# Patient Record
Sex: FEMALE | Employment: FULL TIME | URBAN - METROPOLITAN AREA
[De-identification: names, ages, dates, MRNs, and addresses within clinical notes are randomized per-mention and may not be internally consistent; named-entity substitution may affect disease eponyms.]

---

## 2017-05-23 PROBLEM — F41.0 ANXIETY ATTACK: Status: ACTIVE | Noted: 2017-05-23

## 2017-05-23 PROBLEM — F33.3 SEVERE EPISODE OF RECURRENT MAJOR DEPRESSIVE DISORDER, WITH PSYCHOTIC FEATURES (HCC): Status: ACTIVE | Noted: 2017-05-23

## 2017-05-23 PROBLEM — F31.61 BIPOLAR 1 DISORDER, MIXED, MILD (HCC): Status: ACTIVE | Noted: 2017-05-23

## 2017-06-15 ENCOUNTER — HOSPITAL ENCOUNTER (OUTPATIENT)
Dept: PHYSICAL THERAPY | Age: 42
End: 2017-06-15

## 2017-06-20 PROBLEM — F31.62 BIPOLAR DISORDER, CURRENT EPISODE MIXED, MODERATE (HCC): Status: RESOLVED | Noted: 2017-06-20 | Resolved: 2017-06-20

## 2017-06-20 PROBLEM — F41.1 GENERALIZED ANXIETY DISORDER: Status: ACTIVE | Noted: 2017-06-20

## 2017-06-20 PROBLEM — F31.62 BIPOLAR DISORDER, CURRENT EPISODE MIXED, MODERATE (HCC): Status: ACTIVE | Noted: 2017-06-20

## 2017-06-20 PROBLEM — R45.4 ANGER REACTION: Status: ACTIVE | Noted: 2017-06-20

## 2017-06-20 PROBLEM — F31.61 BIPOLAR 1 DISORDER, MIXED, MILD (HCC): Status: RESOLVED | Noted: 2017-05-23 | Resolved: 2017-06-20

## 2017-06-20 PROBLEM — F33.3 SEVERE EPISODE OF RECURRENT MAJOR DEPRESSIVE DISORDER, WITH PSYCHOTIC FEATURES (HCC): Status: RESOLVED | Noted: 2017-05-23 | Resolved: 2017-06-20

## 2017-06-21 ENCOUNTER — HOSPITAL ENCOUNTER (OUTPATIENT)
Dept: PHYSICAL THERAPY | Age: 42
Discharge: HOME | End: 2017-06-21
Payer: COMMERCIAL

## 2017-06-21 PROCEDURE — 97161 PT EVAL LOW COMPLEX 20 MIN: CPT

## 2017-06-21 NOTE — PROGRESS NOTES
Ambulatory/Rehab Services H2 Model Falls Risk Assessment    Risk Factor Pts. ·   Confusion/Disorientation/Impulsivity  []    4 ·   Symptomatic Depression  []   2 ·   Altered Elimination  []   1 ·   Dizziness/Vertigo  []   1 ·   Gender (Male)  []   1 ·   Any administered antiepileptics (anticonvulsants):  []   2 ·   Any administered benzodiazepines:  []   1 ·   Visual Impairment (specify):  []   1 ·   Portable Oxygen Use  []   1 ·   Orthostatic ? BP  []   1 ·   History of Recent Falls (within 3 mos.)  []   5     Ability to Rise from Chair (choose one) Pts. ·   Ability to rise in a single movement  [x]   0 ·   Pushes up, successful in one attempt  []   1 ·   Multiple attempts, but successful  []   3 ·   Unable to rise without assistance  []   4   Total: (5 or greater = High Risk) 0     Falls Prevention Plan:   []                Physical Limitations to Exercise (specify):   []                Mobility Assistance Device (type):   []                Exercise/Equipment Adaptation (specify):    ©2010 [facility] [address] [place] [address] Patent #[id number].  Federal Law prohibits the replication, distribution or use without written permission from [facility] [employer]
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
## 2017-06-26 ENCOUNTER — HOSPITAL ENCOUNTER (OUTPATIENT)
Dept: PHYSICAL THERAPY | Age: 42
Discharge: HOME | End: 2017-06-26
Payer: COMMERCIAL

## 2017-06-26 ENCOUNTER — HOSPITAL ENCOUNTER (OUTPATIENT)
Dept: SURGERY | Age: 42
Discharge: HOME | End: 2017-06-26
Payer: COMMERCIAL

## 2017-06-26 VITALS
OXYGEN SATURATION: 100 % | SYSTOLIC BLOOD PRESSURE: 122 MMHG | HEIGHT: 67 IN | WEIGHT: 158.5 LBS | DIASTOLIC BLOOD PRESSURE: 71 MMHG | TEMPERATURE: 98 F | BODY MASS INDEX: 24.88 KG/M2 | HEART RATE: 105 BPM | RESPIRATION RATE: 20 BRPM

## 2017-06-26 LAB
ANION GAP BLD CALC-SCNC: 6 MMOL/L (ref 7–16)
APPEARANCE UR: CLEAR
BACTERIA SPEC CULT: NORMAL
BACTERIA URNS QL MICRO: 0 /HPF
BASOPHILS # BLD AUTO: 0.1 K/UL (ref 0–0.2)
BASOPHILS # BLD: 1 % (ref 0–2)
BILIRUB UR QL: ABNORMAL
BUN SERPL-MCNC: 8 MG/DL (ref 6–23)
CALCIUM SERPL-MCNC: 8.4 MG/DL (ref 8.3–10.4)
CHLORIDE SERPL-SCNC: 108 MMOL/L (ref 98–107)
CO2 SERPL-SCNC: 29 MMOL/L (ref 21–32)
COLOR UR: YELLOW
CREAT SERPL-MCNC: 0.73 MG/DL (ref 0.6–1)
DIFFERENTIAL METHOD BLD: ABNORMAL
EOSINOPHIL # BLD: 0.2 K/UL (ref 0–0.8)
EOSINOPHIL NFR BLD: 2 % (ref 0.5–7.8)
ERYTHROCYTE [DISTWIDTH] IN BLOOD BY AUTOMATED COUNT: 14.6 % (ref 11.9–14.6)
GLUCOSE SERPL-MCNC: 69 MG/DL (ref 65–100)
GLUCOSE UR STRIP.AUTO-MCNC: NEGATIVE MG/DL
HCT VFR BLD AUTO: 42 % (ref 35.8–46.3)
HGB BLD-MCNC: 13.2 G/DL (ref 11.7–15.4)
HGB UR QL STRIP: ABNORMAL
IMM GRANULOCYTES # BLD: 0 K/UL (ref 0–0.5)
IMM GRANULOCYTES NFR BLD AUTO: 0.6 % (ref 0–5)
KETONES UR QL STRIP.AUTO: NEGATIVE MG/DL
LEUKOCYTE ESTERASE UR QL STRIP.AUTO: NEGATIVE
LYMPHOCYTES # BLD AUTO: 33 % (ref 13–44)
LYMPHOCYTES # BLD: 2.4 K/UL (ref 0.5–4.6)
MCH RBC QN AUTO: 24.6 PG (ref 26.1–32.9)
MCHC RBC AUTO-ENTMCNC: 31.4 G/DL (ref 31.4–35)
MCV RBC AUTO: 78.2 FL (ref 79.6–97.8)
MONOCYTES # BLD: 0.7 K/UL (ref 0.1–1.3)
MONOCYTES NFR BLD AUTO: 10 % (ref 4–12)
NEUTS SEG # BLD: 3.9 K/UL (ref 1.7–8.2)
NEUTS SEG NFR BLD AUTO: 53 % (ref 43–78)
NITRITE UR QL STRIP.AUTO: NEGATIVE
PH UR STRIP: 6 [PH] (ref 5–9)
PLATELET # BLD AUTO: 236 K/UL (ref 150–450)
PMV BLD AUTO: 11.6 FL (ref 10.8–14.1)
POTASSIUM SERPL-SCNC: 3.9 MMOL/L (ref 3.5–5.1)
PROT UR STRIP-MCNC: NEGATIVE MG/DL
RBC # BLD AUTO: 5.37 M/UL (ref 4.05–5.25)
SERVICE CMNT-IMP: NORMAL
SODIUM SERPL-SCNC: 143 MMOL/L (ref 136–145)
SP GR UR REFRACTOMETRY: 1.02 (ref 1–1.02)
UROBILINOGEN UR QL STRIP.AUTO: 0.2 EU/DL (ref 0.2–1)
WBC # BLD AUTO: 7.2 K/UL (ref 4.3–11.1)

## 2017-06-26 PROCEDURE — 97140 MANUAL THERAPY 1/> REGIONS: CPT

## 2017-06-26 RX ORDER — TIZANIDINE 4 MG/1
4 TABLET ORAL
COMMUNITY
End: 2017-07-26

## 2017-06-26 RX ORDER — CLONAZEPAM 1 MG/1
1 TABLET ORAL 2 TIMES DAILY
COMMUNITY
End: 2017-08-02 | Stop reason: SDUPTHER

## 2017-06-26 RX ORDER — ALBUTEROL SULFATE 90 UG/1
2 AEROSOL, METERED RESPIRATORY (INHALATION)
COMMUNITY
End: 2017-07-12 | Stop reason: SDUPTHER

## 2017-06-26 NOTE — PERIOP NOTES
todays labs reviewed-- urine abnormal--- called office to report---left detail message for Barbara Perez out return # if any questions  - called pt to inform nasal swab from today was neg-- no tx needed--pt verbalized understanding
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
## 2017-06-26 NOTE — PERIOP NOTES
Patient verified name, , and surgery as listed in [facility]. TYPE  CASE:3  Orders per surgeon on chart  Labs per surgeon: per protocol-- cbc with diff, bmp, urine--- these collected and sent to lab  Labs per anesthesia protocol: type and screen dos  EKG  :  none  MRSA/MSSA:collected today and sent to lab  Pt instructed that they will be notified of positive results and will use mupirocin ointment as directed. Patient provided with handouts including guide to surgery , transfusions, pain management and hand hygiene for the family and community. Pt verbalizes understanding of all pre-op instructions . Instructed that family must be present in building at all times. Hibiclens and instructions given per hospital policy. Instructed patient to continue  previous medications as prescribed prior to surgery and  to take the following medications the day of surgery according to anesthesia guidelines : klonopin, cymbalta, seroquel, if needed flexeril OR zanaflex-- , oxycodone   And bring albuterol inhaler dos    Continue all previous medications unless otherwise directed. Original medication prescription bottles was not visualized during patient appointment. Instructed patient to hold  the following medications prior to surgery: mobic      Patient verbalized understanding of all instructions and provided all medical/health information to the best of their ability. Road to Recovery Spine surgery patient guide given. Instructed on incentive spirometry with return demonstration. Long handled prehab sponge given with instructions for use. Patient viewed spine prehab video.      Spoke with dr Raul Yoon over the phone-- case over 200 minutes--- ok for mda to see dos

## 2017-06-28 ENCOUNTER — APPOINTMENT (OUTPATIENT)
Dept: PHYSICAL THERAPY | Age: 42
End: 2017-06-28
Payer: COMMERCIAL

## 2017-07-18 ENCOUNTER — HOSPITAL ENCOUNTER (OUTPATIENT)
Dept: PHYSICAL THERAPY | Age: 42
Discharge: HOME | End: 2017-07-18
Payer: COMMERCIAL

## 2017-07-18 PROCEDURE — 97161 PT EVAL LOW COMPLEX 20 MIN: CPT

## 2017-07-18 NOTE — PROGRESS NOTES
Ambulatory/Rehab Services H2 Model Falls Risk Assessment    Risk Factor Pts. ·   Confusion/Disorientation/Impulsivity  []    4 ·   Symptomatic Depression  []   2 ·   Altered Elimination  []   1 ·   Dizziness/Vertigo  []   1 ·   Gender (Male)  []   1 ·   Any administered antiepileptics (anticonvulsants):  []   2 ·   Any administered benzodiazepines:  []   1 ·   Visual Impairment (specify):  []   1 ·   Portable Oxygen Use  []   1 ·   Orthostatic ? BP  []   1 ·   History of Recent Falls (within 3 mos.)  []   5     Ability to Rise from Chair (choose one) Pts. ·   Ability to rise in a single movement  [x]   0 ·   Pushes up, successful in one attempt  []   1 ·   Multiple attempts, but successful  []   3 ·   Unable to rise without assistance  []   4   Total: (5 or greater = High Risk) 0     Falls Prevention Plan:   []                Physical Limitations to Exercise (specify):   []                Mobility Assistance Device (type):   []                Exercise/Equipment Adaptation (specify):    ©2010 [employer] of [address] Patent #[id number].  Federal Law prohibits the replication, distribution or use without written permission from [employer]

## 2017-07-18 NOTE — PROGRESS NOTES
Aimee Flores  : [address] [facility] KRANTHI PLASCENCIA  [facility], [address] [place] [address],[address], [place] [address].  [phone]   [phone]         OUTPATIENT PHYSICAL THERAPY:Initial Assessment 2017    ICD-10: Treatment Diagnosis: Low back pain (M54.5), Spinal stenosis, lumbar region (M48.06), Other intervertebral disc degeneration, lumbosacral region (M51.37)  Precautions/Allergies:   Tramadol   Fall Risk Score: 0 (? 5 = High Risk)  MD Orders: Evaluate and treat, modalities, core strengthening and stretching (17) MEDICAL/REFERRING DIAGNOSIS:  Spinal stenosis of lumbar region with neurogenic claudication, degeneration of lumbosacral intervertebral disc   DATE OF ONSET: \"Years ago\"  REFERRING PHYSICIAN: Yakov Phan MD  RETURN PHYSICIAN APPOINTMENT: TBD; pt unsure     INITIAL ASSESSMENT:  Ms. Hailey Chavez presents to PT with complaints of lower back pain which affects both legs which is worse on the L today. Patient demonstrates apprehension to perform movements involving lower back, increased pain and radicular pain into L LE. Patient's impairments limit her ability to perform work tasks, limit her ability to ambulate for long distances and generally reduce her activity participation overall. Patient will benefit from skilled PT intervention to reduce her discomfort and maximize her physical capabilities to improve function. PROBLEM LIST (Impacting functional limitations):  1. Decreased Strength  2. Increased Pain  3. Decreased Activity Tolerance  4. Decreased Flexibility/Joint Mobility INTERVENTIONS PLANNED:  1. Heat  2. Home Exercise Program (HEP)  3. Manual Therapy  4. Range of Motion (ROM)  5. Therapeutic Exercise/Strengthening   TREATMENT PLAN:  Effective Dates: 17 TO 17. Frequency/Duration: 2-3 visits per week for 6 weeks\"}  GOALS: (Goals have been discussed and agreed upon with patient.)  Short-Term Functional Goals: Time Frame: 3 weeks  1.  Pt will report 3-4 out of 10 pain in legs and lower back and improve score on Oswestry Disability Index to 30% to indicate improved subjective report of function. 2. Patient will report being able to sleep without interference for >3 nights per week from low back and/or leg symptoms. 3. Patient will tolerate 30 minutes of physical therapy treatment without exacerbation of low back and leg symptoms. 4. Patient will be independent and compliant with home exercise program.  Discharge Goals: Time Frame: 6 weeks  1. Patient will report no greater than 4 out of 10 low back and leg pain at worst  2. Patient will be able to ambulate on treadmill for greater than 30 minutes without having to cease activity due to low back and/or leg pain. 3. Patient will report being able to walk around mall without increased lower back pain. 4. Patient will be improve score on Oswestry Disability index to 20% to indicate improved function. 5. Pt will be independent with advanced HEP for trunk stabilization. Rehabilitation Potential For Stated Goals: Fair, guarded    Regarding [facility], I certify that the treatment plan above will be carried out by a therapist or under their direction. Thank you for this referral,    Norma Harris PT       Referring Physician Signature:     Beatrice Barros MD              Date                    The information in this section was collected on 7-18-17 (except where otherwise noted). HISTORY:   History of Present Injury/Illness (Reason for Referral):  Pt reports that she has had lower back pain for 'years'. She has had physical therapy in the past and multiple injections which were all unsuccessful at resolving symptoms. Patient states that she has pain that radiates into both legs to the ankle and incapacitates her to the point that she has to miss work (approximately 3-4 times per month).  States that she is unable to walk on treadmill for >10 minutes without having to quit due to back pain. Reports that when her symptoms are aggravated she stays in bed in fetal position with her son taking care of her. Past Medical History/Comorbidities:   Ms. Lynne Nieto  has a past medical history of Acid reflux; Anemia; Anxiety; Arthralgia of multiple sites; Asthma; Bipolar 1 disorder ([address] Chronic pain; Depressive disorder; Genital herpes; MRSA (methicillin resistant staph aureus) culture positive; and Spinal stenosis. She also has no past medical history of Adverse effect of anesthesia; Difficult intubation; Malignant hyperthermia due to anesthesia; Nausea & vomiting; or Pseudocholinesterase deficiency. Ms. Lynne Nieto  has a past surgical history that includes tubal ligation. Social History/Living Environment:     Patient living with friend in apartment with four steps to enter. Patient's son also lives with her. 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 Current Medications:       Current Outpatient Prescriptions:     traMADol (ULTRAM) 50 mg tablet, TAKE 1 TABLET BY MOUTH 3 TIMES A DAY AS NEEDED FOR PAIN, Disp: , Rfl: 0    meloxicam (MOBIC) 7.5 mg tablet, Take 1 Tab by mouth two (2) times daily (with meals). , Disp: 60 Tab, Rfl: 2    gabapentin (NEURONTIN) 600 mg tablet, Take 0.5 Tabs by mouth three (3) times daily. , Disp: 90 Tab, Rfl: 2    montelukast (SINGULAIR) 10 mg tablet, Take 1 Tab by mouth daily. , Disp: 30 Tab, Rfl: 2    SUMAtriptan (IMITREX) 50 mg tablet, Take 1 Tab by mouth once as needed for Migraine (may repeat after 2 hours if needed, max 2 tablets in 24 hours). , Disp: 9 Tab, Rfl: 2    furosemide (LASIX) 20 mg tablet, Take 1 Tab by mouth daily as needed. , Disp: 30 Tab, Rfl: 1    ADVAIR DISKUS 250-50 mcg/dose diskus inhaler, Take 1 Puff by inhalation two (2) times a day., Disp: 1 Inhaler, Rfl: 2    promethazine (PHENERGAN) 25 mg tablet, Take 1 Tab by mouth every six (6) hours as needed for Nausea (associated with migraines). , Disp: 30 Tab, Rfl: 0    raNITIdine (ZANTAC) 300 mg tablet, Take 1 Tab by mouth daily. , Disp: 30 Tab, Rfl: 2    oxyCODONE IR (OXY-IR) 15 mg immediate release tablet, Take 15 mg by mouth every four (4) hours as needed for Pain., Disp: , Rfl:     oxyCODONE-acetaminophen (PERCOCET)  mg per tablet, Take  by mouth., Disp: , Rfl:     aspirin (ASPIRIN) 325 mg tablet, Take 325 mg by mouth., Disp: , Rfl:     nitroglycerin (NITROSTAT) 0.4 mg SL tablet, 0.4 mg by SubLINGual route., Disp: , Rfl:     albuterol (PROVENTIL HFA, VENTOLIN HFA, PROAIR HFA) 90 mcg/actuation inhaler, Take 2 Puffs by inhalation. , Disp: , Rfl:     acyclovir (ZOVIRAX) 400 mg tablet, TAKE 1 TABLET BY MOUTH TWICE A DAY, Disp: , Rfl: 12    clonazePAM (KLONOPIN) 1 mg tablet, Take 1 mg by mouth two (2) times a day., Disp: , Rfl:     tiZANidine (ZANAFLEX) 4 mg tablet, Take 4 mg by mouth three (3) times daily as needed. , Disp: , Rfl:     DULoxetine (CYMBALTA) 60 mg capsule, Take 1 Cap by mouth two (2) times a day for 30 days. , Disp: 60 Cap, Rfl: 1    QUEtiapine (SEROQUEL) 100 mg tablet, Take 1 Tab by mouth three (3) times daily for 30 days. , Disp: 90 Tab, Rfl: 3    cyclobenzaprine (FLEXERIL) 10 mg tablet, Take  by mouth three (3) times daily as needed for Muscle Spasm(s). , Disp: , Rfl:    Date Last Reviewed:  7-1817   Number of Personal Factors/Comorbidities that affect the Plan of Care: 1-2: MODERATE COMPLEXITY   EXAMINATION:   Observation/Orthostatic Postural Assessment:          Patient leans to the R during subjective portion of evaluation. Ambulates slowly with increased upright trunk posture during gait. Patient is very hesitant to perform ROM measures for lower back. Palpation:          Apprehension with palpation of lumbar paraspinals. Muscles felt tight and guarded but palpation was limited to mild, light touch due to patient apprehension.   ROM:          Patient declined to perform lumbar flexion ROM stating that she did not want to 'aggravate' anything. Lumbar extension limited to 25% or normal ROM but did not reproduce pain. Strength:          R LE: hip flexion, IR/ER and abduction 5/5, knee flexion/ext 5/5, ankle DF/PF 5/5        L LE: hip flexion, IR/ER and abduction 5/5, knee flex/ext 5/5, ankle DF/PF 5/5  Special Tests:          Slump test negative bilaterally, inconclusive straight leg raise bilaterally secondary to apprehension with movement  Neurological Screen:  Normal sensation to light touch bilaterally, reflexes normal at S2 bilaterally, mildly reduced L3-4 on L, normal on R       Body Structures Involved:  1. Nerves  2. Bones  3. Joints  4. Muscles Body Functions Affected:  1. Sensory/Pain  2. Neuromusculoskeletal  3. Movement Related Activities and Participation Affected:  1. Mobility  2. Self Care  3. Domestic Life  4. Community, Social and [place]   Number of elements (examined above) that affect the Plan of Care: 4+: HIGH COMPLEXITY   CLINICAL PRESENTATION:   Presentation: Stable and uncomplicated: LOW COMPLEXITY   CLINICAL DECISION MAKING:   Outcome Measure: Tool Used: Modified Oswestry Low Back Pain Questionnaire  Score:  Initial: 24/50  Most Recent: 24/50 (Date: 7-19-17 )   Interpretation of Score: Each section is scored on a 0-5 scale, 5 representing the greatest disability. The scores of each section are added together for a total score of 50. Medical Necessity:   · Skilled intervention continues to be required due to heightened fear of low back movement, increased pain that radiates into B LEs, decreased activity participation secondary to low back pain. .  Reason for Services/Other Comments:  · Patient is hopeful to have surgery and expressed doubts regarding effectiveness of physical therapy. .   Use of outcome tool(s) and clinical judgement create a POC that gives a: Questionable prediction of patient's progress: MODERATE COMPLEXITY            TREATMENT:   (In addition to Assessment/Re-Assessment sessions the following treatments were rendered)  Pre-treatment Symptoms/Complaints:  Patient reported that she has had multiple physical therapy appointments and spinal injections in the past to address her lower back symptoms which were not successful. Stated that she is hesitant to schedule appointments in the middle of the week as low back irritation will be so severe it will keep her out of work for days. Reported that she is hopeful to have the surgery but has to have physical therapy beforehand in order for her insurance to pay for it. Pain: Initial:     7 out of 10 Post Session:  7 out of 10     Assessment only today, no treatment provided. HEP: Pt provided with handout and received instruction on performance of posterior pelvic tilts and single knee to chest stretch to address stenosis symptoms. Patient verbalized understanding, stating that she had performed them before without symptoms resolution. Treatment/Session Assessment:    · Response to Treatment:  Patient very hesitant to receive any manual treatment and unwilling to perform lumbar ROM during evaluation. Patient demonstrated increased hesitation that she would exacerbate her symptoms if she moved. Concern by PT that patient may have self-limiting attitude regarding compliance with treatment due to chronic nature of condition and previous unsuccessful treatments with PT. No movements during treatment created symptom exacerbation or increased complaints from patient of LE radicular symptoms. · Compliance with Program/Exercises: Will assess as treatment progresses. · Recommendations/Intent for next treatment session: \"Next visit will focus on decreased apprehension with treatment and increased symptom centralization. \".   Total Treatment Duration: 41 minutes  Time In: 1536  Time Out: [address], PT

## 2017-07-20 ENCOUNTER — HOSPITAL ENCOUNTER (OUTPATIENT)
Dept: PHYSICAL THERAPY | Age: 42
Discharge: HOME | End: 2017-07-20
Payer: COMMERCIAL

## 2017-07-20 PROCEDURE — 97110 THERAPEUTIC EXERCISES: CPT

## 2017-07-20 PROCEDURE — 97140 MANUAL THERAPY 1/> REGIONS: CPT

## 2017-07-20 NOTE — PROGRESS NOTES
Louis Polo  : 1975 [facility] KRANTHI PLASCENCIA  [address] [place] [address],  Diaz Alejandre.  [phone]   [phone]         OUTPATIENT PHYSICAL THERAPY:Daily Note 2017    ICD-10: Treatment Diagnosis: Low back pain (M54.5), Spinal stenosis, lumbar region (M48.06), Other intervertebral disc degeneration, lumbosacral region (M51.37)  Precautions/Allergies:   Tramadol   Fall Risk Score: 0 (? 5 = High Risk)  MD Orders: Evaluate and treat, modalities, core strengthening and stretching (17) MEDICAL/REFERRING DIAGNOSIS:  Spinal stenosis of lumbar region with neurogenic claudication, degeneration of lumbosacral intervertebral disc   DATE OF ONSET: \"Years ago\"  REFERRING PHYSICIAN: Jaqueline Mixon MD  RETURN PHYSICIAN APPOINTMENT: TBD; pt unsure     INITIAL ASSESSMENT:  Ms. Rajesh Yo presents to PT with complaints of lower back pain which affects both legs which is worse on the L today. Patient demonstrates apprehension to perform movements involving lower back, increased pain and radicular pain into L LE. Patient's impairments limit her ability to perform work tasks, limit her ability to ambulate for long distances and generally reduce her activity participation overall. Patient will benefit from skilled PT intervention to reduce her discomfort and maximize her physical capabilities to improve function. PROBLEM LIST (Impacting functional limitations):  1. Decreased Strength  2. Increased Pain  3. Decreased Activity Tolerance  4. Decreased Flexibility/Joint Mobility INTERVENTIONS PLANNED:  1. Heat  2. Home Exercise Program (HEP)  3. Manual Therapy  4. Range of Motion (ROM)  5. Therapeutic Exercise/Strengthening   TREATMENT PLAN:  Effective Dates: 17 TO 17. Frequency/Duration: 2-3 visits per week for 6 weeks\"}  GOALS: (Goals have been discussed and agreed upon with patient.)  Short-Term Functional Goals: Time Frame: 3 weeks  1.  Pt will report 3-4 out of 10 pain in legs and lower back and improve score on Oswestry Disability Index to 30% to indicate improved subjective report of function. 2. Patient will report being able to sleep without interference for >3 nights per week from low back and/or leg symptoms. 3. Patient will tolerate 30 minutes of physical therapy treatment without exacerbation of low back and leg symptoms. 4. Patient will be independent and compliant with home exercise program.  Discharge Goals: Time Frame: 6 weeks  1. Patient will report no greater than 4 out of 10 low back and leg pain at worst  2. Patient will be able to ambulate on treadmill for greater than 30 minutes without having to cease activity due to low back and/or leg pain. 3. Patient will report being able to walk around mall without increased lower back pain. 4. Patient will be improve score on Oswestry Disability index to 20% to indicate improved function. 5. Pt will be independent with advanced HEP for trunk stabilization. Rehabilitation Potential For Stated Goals: Fair, guarded    Regarding [facility], I certify that the treatment plan above will be carried out by a therapist or under their direction. Thank you for this referral,    Alysa Ingram, PT       Referring Physician Signature:     Alis Barraza MD              Date                    The information in this section was collected on 7-18-17 (except where otherwise noted). HISTORY:   History of Present Injury/Illness (Reason for Referral):  Pt reports that she has had lower back pain for 'years'. She has had physical therapy in the past and multiple injections which were all unsuccessful at resolving symptoms. Patient states that she has pain that radiates into both legs to the ankle and incapacitates her to the point that she has to miss work (approximately 3-4 times per month). States that she is unable to walk on treadmill for >10 minutes without having to quit due to back pain. Reports that when her symptoms are aggravated she stays in bed in fetal position with her son taking care of her. Past Medical History/Comorbidities:   Ms. Leopold Birkenhead  has a past medical history of Acid reflux; Anemia; Anxiety; Arthralgia of multiple sites; Asthma; Bipolar 1 disorder ([address] Chronic pain; Depressive disorder; Genital herpes; MRSA (methicillin resistant staph aureus) culture positive; and Spinal stenosis. She also has no past medical history of Adverse effect of anesthesia; Difficult intubation; Malignant hyperthermia due to anesthesia; Nausea & vomiting; or Pseudocholinesterase deficiency. Ms. Leopold Birkenhead  has a past surgical history that includes tubal ligation. Social History/Living Environment:     Patient living with friend in apartment with four steps to enter. Patient's son also lives with her. 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 Current Medications:       Current Outpatient Prescriptions:     traMADol (ULTRAM) 50 mg tablet, TAKE 1 TABLET BY MOUTH 3 TIMES A DAY AS NEEDED FOR PAIN, Disp: , Rfl: 0    meloxicam (MOBIC) 7.5 mg tablet, Take 1 Tab by mouth two (2) times daily (with meals). , Disp: 60 Tab, Rfl: 2    gabapentin (NEURONTIN) 600 mg tablet, Take 0.5 Tabs by mouth three (3) times daily. , Disp: 90 Tab, Rfl: 2    montelukast (SINGULAIR) 10 mg tablet, Take 1 Tab by mouth daily. , Disp: 30 Tab, Rfl: 2    SUMAtriptan (IMITREX) 50 mg tablet, Take 1 Tab by mouth once as needed for Migraine (may repeat after 2 hours if needed, max 2 tablets in 24 hours). , Disp: 9 Tab, Rfl: 2    furosemide (LASIX) 20 mg tablet, Take 1 Tab by mouth daily as needed. , Disp: 30 Tab, Rfl: 1    ADVAIR DISKUS 250-50 mcg/dose diskus inhaler, Take 1 Puff by inhalation two (2) times a day., Disp: 1 Inhaler, Rfl: 2    promethazine (PHENERGAN) 25 mg tablet, Take 1 Tab by mouth every six (6) hours as needed for Nausea (associated with migraines). , Disp: 30 Tab, Rfl: 0    raNITIdine (ZANTAC) 300 mg tablet, Take 1 Tab by mouth daily. , Disp: 30 Tab, Rfl: 2    oxyCODONE IR (OXY-IR) 15 mg immediate release tablet, Take 15 mg by mouth every four (4) hours as needed for Pain., Disp: , Rfl:     oxyCODONE-acetaminophen (PERCOCET)  mg per tablet, Take  by mouth., Disp: , Rfl:     aspirin (ASPIRIN) 325 mg tablet, Take 325 mg by mouth., Disp: , Rfl:     nitroglycerin (NITROSTAT) 0.4 mg SL tablet, 0.4 mg by SubLINGual route., Disp: , Rfl:     albuterol (PROVENTIL HFA, VENTOLIN HFA, PROAIR HFA) 90 mcg/actuation inhaler, Take 2 Puffs by inhalation. , Disp: , Rfl:     acyclovir (ZOVIRAX) 400 mg tablet, TAKE 1 TABLET BY MOUTH TWICE A DAY, Disp: , Rfl: 12    clonazePAM (KLONOPIN) 1 mg tablet, Take 1 mg by mouth two (2) times a day., Disp: , Rfl:     tiZANidine (ZANAFLEX) 4 mg tablet, Take 4 mg by mouth three (3) times daily as needed. , Disp: , Rfl:     DULoxetine (CYMBALTA) 60 mg capsule, Take 1 Cap by mouth two (2) times a day for 30 days. , Disp: 60 Cap, Rfl: 1    QUEtiapine (SEROQUEL) 100 mg tablet, Take 1 Tab by mouth three (3) times daily for 30 days. , Disp: 90 Tab, Rfl: 3    cyclobenzaprine (FLEXERIL) 10 mg tablet, Take  by mouth three (3) times daily as needed for Muscle Spasm(s). , Disp: , Rfl:    Date Last Reviewed:  7-1817   Number of Personal Factors/Comorbidities that affect the Plan of Care: 1-2: MODERATE COMPLEXITY   EXAMINATION:   Observation/Orthostatic Postural Assessment:          Patient leans to the R during subjective portion of evaluation. Ambulates slowly with increased upright trunk posture during gait. Patient is very hesitant to perform ROM measures for lower back. Palpation:          Apprehension with palpation of lumbar paraspinals. Muscles felt tight and guarded but palpation was limited to mild, light touch due to patient apprehension.   ROM:          Patient declined to perform lumbar flexion ROM stating that she did not want to 'aggravate' anything. Lumbar extension limited to 25% or normal ROM but did not reproduce pain. Strength:          R LE: hip flexion, IR/ER and abduction 5/5, knee flexion/ext 5/5, ankle DF/PF 5/5        L LE: hip flexion, IR/ER and abduction 5/5, knee flex/ext 5/5, ankle DF/PF 5/5  Special Tests:          Slump test negative bilaterally, inconclusive straight leg raise bilaterally secondary to apprehension with movement  Neurological Screen:  Normal sensation to light touch bilaterally, reflexes normal at S2 bilaterally, mildly reduced L3-4 on L, normal on R       Body Structures Involved:  1. Nerves  2. Bones  3. Joints  4. Muscles Body Functions Affected:  1. Sensory/Pain  2. Neuromusculoskeletal  3. Movement Related Activities and Participation Affected:  1. Mobility  2. Self Care  3. Domestic Life  4. Community, Social and [place]   Number of elements (examined above) that affect the Plan of Care: 4+: HIGH COMPLEXITY   CLINICAL PRESENTATION:   Presentation: Stable and uncomplicated: LOW COMPLEXITY   CLINICAL DECISION MAKING:   Outcome Measure: Tool Used: Modified Oswestry Low Back Pain Questionnaire  Score:  Initial: 24/50  Most Recent: 24/50 (Date: 7-19-17 )   Interpretation of Score: Each section is scored on a 0-5 scale, 5 representing the greatest disability. The scores of each section are added together for a total score of 50. Medical Necessity:   · Skilled intervention continues to be required due to heightened fear of low back movement, increased pain that radiates into B LEs, decreased activity participation secondary to low back pain. .  Reason for Services/Other Comments:  · Patient is hopeful to have surgery and expressed doubts regarding effectiveness of physical therapy. .   Use of outcome tool(s) and clinical judgement create a POC that gives a: Questionable prediction of patient's progress: MODERATE COMPLEXITY            TREATMENT:   (In addition to Assessment/Re-Assessment sessions the following treatments were rendered)  Pre-treatment Symptoms/Complaints: \"We can work on my legs, but not my back\". Pain: Initial:     7 out of 10 Post Session:  Stated that her pain was worse; no numeric value reported     Manual therapy (14 minutes): long-axis hip distraction bilaterally and B hip ROM to reduce LE symptoms and improve B LE ROM    Therapeutic exercise ( 17 minutes): Exercises performed per grid below. Patient required verbal cues and demonstration to perform hip abduction exercises and required encouragement to perform exercises correctly. Date:  7-20-17 Date:   Date:     Activity/Exercise Parameters Parameters Parameters   Glute sets 2 x 10ea     Lumbar ball rolls 2 x 10ea     Standing hip abduction 2 x 10  ea     Calf raises 2 x 10 ea     Slantboard calf stretches 3 x 20\" each                    HEP: Pt instructed to continue with existing HEP. Treatment/Session Assessment:    · Response to Treatment:  Pt remains apprehensive to receive any manual treatment interventions aimed at low back symptoms or which involve larger movements of LEs. Patient able to perform hip/gluteal exercises without visible evidence of increased lower back pain. · Compliance with Program/Exercises: Compliant with attendance. Patient reluctant to participate in trunk strengthening exercises. · Recommendations/Intent for next treatment session: \"Next visit will focus on decreased apprehension with treatment and increased symptom centralization. \".   Total Treatment Duration:31 minutes  Time In: 1536  Time Out: [address], PT

## 2017-07-25 ENCOUNTER — HOSPITAL ENCOUNTER (OUTPATIENT)
Dept: PHYSICAL THERAPY | Age: 42
Discharge: HOME | End: 2017-07-25
Payer: COMMERCIAL

## 2017-07-25 PROCEDURE — 97110 THERAPEUTIC EXERCISES: CPT

## 2017-07-25 NOTE — PROGRESS NOTES
Marco Antonio Uribe  : 1975 [facility] at [facility] KRANTHI PLASCENCIA  [address] [place] [address], [facility], [address] [place] [address]  [phone]   [phone]         OUTPATIENT PHYSICAL THERAPY:Daily Note 2017    ICD-10: Treatment Diagnosis: Low back pain (M54.5), Spinal stenosis, lumbar region (M48.06), Other intervertebral disc degeneration, lumbosacral region (M51.37)  Precautions/Allergies:   Tramadol   Fall Risk Score: 0 (? 5 = High Risk)  MD Orders: Evaluate and treat, modalities, core strengthening and stretching (17) MEDICAL/REFERRING DIAGNOSIS:  Spinal stenosis of lumbar region with neurogenic claudication, degeneration of lumbosacral intervertebral disc   DATE OF ONSET: \"Years ago\"  REFERRING PHYSICIAN: Landen Saravia MD  RETURN PHYSICIAN APPOINTMENT: TBD; pt unsure     INITIAL ASSESSMENT:  Ms. Tayla Crain presents to PT with complaints of lower back pain which affects both legs which is worse on the L today. Patient demonstrates apprehension to perform movements involving lower back, increased pain and radicular pain into L LE. Patient's impairments limit her ability to perform work tasks, limit her ability to ambulate for long distances and generally reduce her activity participation overall. Patient will benefit from skilled PT intervention to reduce her discomfort and maximize her physical capabilities to improve function. PROBLEM LIST (Impacting functional limitations):  1. Decreased Strength  2. Increased Pain  3. Decreased Activity Tolerance  4. Decreased Flexibility/Joint Mobility INTERVENTIONS PLANNED:  1. Heat  2. Home Exercise Program (HEP)  3. Manual Therapy  4. Range of Motion (ROM)  5. Therapeutic Exercise/Strengthening   TREATMENT PLAN:  Effective Dates: 17 TO 17. Frequency/Duration: 2-3 visits per week for 6 weeks\"}  GOALS: (Goals have been discussed and agreed upon with patient.)  Short-Term Functional Goals: Time Frame: 3 weeks  1.  Pt will report 3-4 out of 10 pain in legs and lower back and improve score on Oswestry Disability Index to 30% to indicate improved subjective report of function. 2. Patient will report being able to sleep without interference for >3 nights per week from low back and/or leg symptoms. 3. Patient will tolerate 30 minutes of physical therapy treatment without exacerbation of low back and leg symptoms. 4. Patient will be independent and compliant with home exercise program.  Discharge Goals: Time Frame: 6 weeks  1. Patient will report no greater than 4 out of 10 low back and leg pain at worst  2. Patient will be able to ambulate on treadmill for greater than 30 minutes without having to cease activity due to low back and/or leg pain. 3. Patient will report being able to walk around mall without increased lower back pain. 4. Patient will be improve score on Oswestry Disability index to 20% to indicate improved function. 5. Pt will be independent with advanced HEP for trunk stabilization. Rehabilitation Potential For Stated Goals: Fair, guarded    Regarding [facility], I certify that the treatment plan above will be carried out by a therapist or under their direction. Thank you for this referral,    Zulay Reyes PT       Referring Physician Signature:     Vianney Cortez MD              Date                    The information in this section was collected on 7-18-17 (except where otherwise noted). HISTORY:   History of Present Injury/Illness (Reason for Referral):  Pt reports that she has had lower back pain for 'years'. She has had physical therapy in the past and multiple injections which were all unsuccessful at resolving symptoms. Patient states that she has pain that radiates into both legs to the ankle and incapacitates her to the point that she has to miss work (approximately 3-4 times per month). States that she is unable to walk on treadmill for >10 minutes without having to quit due to back pain. Reports that when her symptoms are aggravated she stays in bed in fetal position with her son taking care of her. Past Medical History/Comorbidities:   Ms. Tree Ascencio  has a past medical history of Acid reflux; Anemia; Anxiety; Arthralgia of multiple sites; Asthma; Bipolar 1 disorder ([address] Chronic pain; Depressive disorder; Genital herpes; MRSA (methicillin resistant staph aureus) culture positive; and Spinal stenosis. She also has no past medical history of Adverse effect of anesthesia; Difficult intubation; Malignant hyperthermia due to anesthesia; Nausea & vomiting; or Pseudocholinesterase deficiency. Ms. Tree Ascencio  has a past surgical history that includes tubal ligation. Social History/Living Environment:     Patient living with friend in apartment with four steps to enter. Patient's son also lives with her. 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 Current Medications:       Current Outpatient Prescriptions:     traMADol (ULTRAM) 50 mg tablet, TAKE 1 TABLET BY MOUTH 3 TIMES A DAY AS NEEDED FOR PAIN, Disp: , Rfl: 0    meloxicam (MOBIC) 7.5 mg tablet, Take 1 Tab by mouth two (2) times daily (with meals). , Disp: 60 Tab, Rfl: 2    gabapentin (NEURONTIN) 600 mg tablet, Take 0.5 Tabs by mouth three (3) times daily. , Disp: 90 Tab, Rfl: 2    montelukast (SINGULAIR) 10 mg tablet, Take 1 Tab by mouth daily. , Disp: 30 Tab, Rfl: 2    SUMAtriptan (IMITREX) 50 mg tablet, Take 1 Tab by mouth once as needed for Migraine (may repeat after 2 hours if needed, max 2 tablets in 24 hours). , Disp: 9 Tab, Rfl: 2    furosemide (LASIX) 20 mg tablet, Take 1 Tab by mouth daily as needed. , Disp: 30 Tab, Rfl: 1    ADVAIR DISKUS 250-50 mcg/dose diskus inhaler, Take 1 Puff by inhalation two (2) times a day., Disp: 1 Inhaler, Rfl: 2    promethazine (PHENERGAN) 25 mg tablet, Take 1 Tab by mouth every six (6) hours as needed for Nausea (associated with migraines). , Disp: 30 Tab, Rfl: 0    raNITIdine (ZANTAC) 300 mg tablet, Take 1 Tab by mouth daily. , Disp: 30 Tab, Rfl: 2    oxyCODONE IR (OXY-IR) 15 mg immediate release tablet, Take 15 mg by mouth every four (4) hours as needed for Pain., Disp: , Rfl:     oxyCODONE-acetaminophen (PERCOCET)  mg per tablet, Take  by mouth., Disp: , Rfl:     aspirin (ASPIRIN) 325 mg tablet, Take 325 mg by mouth., Disp: , Rfl:     nitroglycerin (NITROSTAT) 0.4 mg SL tablet, 0.4 mg by SubLINGual route., Disp: , Rfl:     albuterol (PROVENTIL HFA, VENTOLIN HFA, PROAIR HFA) 90 mcg/actuation inhaler, Take 2 Puffs by inhalation. , Disp: , Rfl:     acyclovir (ZOVIRAX) 400 mg tablet, TAKE 1 TABLET BY MOUTH TWICE A DAY, Disp: , Rfl: 12    clonazePAM (KLONOPIN) 1 mg tablet, Take 1 mg by mouth two (2) times a day., Disp: , Rfl:     tiZANidine (ZANAFLEX) 4 mg tablet, Take 4 mg by mouth three (3) times daily as needed. , Disp: , Rfl:     cyclobenzaprine (FLEXERIL) 10 mg tablet, Take  by mouth three (3) times daily as needed for Muscle Spasm(s). , Disp: , Rfl:    Date Last Reviewed:  7-1817   Number of Personal Factors/Comorbidities that affect the Plan of Care: 1-2: MODERATE COMPLEXITY   EXAMINATION:   Observation/Orthostatic Postural Assessment:          Patient leans to the R during subjective portion of evaluation. Ambulates slowly with increased upright trunk posture during gait. Patient is very hesitant to perform ROM measures for lower back. Palpation:          Apprehension with palpation of lumbar paraspinals. Muscles felt tight and guarded but palpation was limited to mild, light touch due to patient apprehension. ROM:          Patient declined to perform lumbar flexion ROM stating that she did not want to 'aggravate' anything. Lumbar extension limited to 25% or normal ROM but did not reproduce pain.   Strength:          R LE: hip flexion, IR/ER and abduction 5/5, knee flexion/ext 5/5, ankle DF/PF 5/5        L LE: hip flexion, IR/ER and abduction 5/5, knee flex/ext 5/5, ankle DF/PF 5/5  Special Tests:          Slump test negative bilaterally, inconclusive straight leg raise bilaterally secondary to apprehension with movement  Neurological Screen:  Normal sensation to light touch bilaterally, reflexes normal at S2 bilaterally, mildly reduced L3-4 on L, normal on R       Body Structures Involved:  1. Nerves  2. Bones  3. Joints  4. Muscles Body Functions Affected:  1. Sensory/Pain  2. Neuromusculoskeletal  3. Movement Related Activities and Participation Affected:  1. Mobility  2. Self Care  3. Domestic Life  4. Community, Social and [place]   Number of elements (examined above) that affect the Plan of Care: 4+: HIGH COMPLEXITY   CLINICAL PRESENTATION:   Presentation: Stable and uncomplicated: LOW COMPLEXITY   CLINICAL DECISION MAKING:   Outcome Measure: Tool Used: Modified Oswestry Low Back Pain Questionnaire  Score:  Initial: 24/50  Most Recent: 24/50 (Date: 7-19-17 )   Interpretation of Score: Each section is scored on a 0-5 scale, 5 representing the greatest disability. The scores of each section are added together for a total score of 50. Medical Necessity:   · Skilled intervention continues to be required due to heightened fear of low back movement, increased pain that radiates into B LEs, decreased activity participation secondary to low back pain. .  Reason for Services/Other Comments:  · Patient is hopeful to have surgery and expressed doubts regarding effectiveness of physical therapy. .   Use of outcome tool(s) and clinical judgement create a POC that gives a: Questionable prediction of patient's progress: MODERATE COMPLEXITY            TREATMENT:   (In addition to Assessment/Re-Assessment sessions the following treatments were rendered)  Pre-treatment Symptoms/Complaints:Stated that she was in pain after her last PT appointment and that she was in pain yesterday and that no one will treat her for her pain.  Stated that she had increased swelling in her legs today and yesterday. Pain: Initial:     7 out of 10 Post Session:  8 out of 10     Manual therapy (0 minutes): Not performed today. Therapeutic exercise (31 minutes): Exercises performed per grid below. Patient required verbal cues and demonstration to perform hip abduction exercises and required encouragement to perform exercises correctly. Date:  7-20-17 Date:  7-25-17 Date:     Activity/Exercise Parameters Parameters Parameters   Glute sets 2 x 10ea 2x 15    Lumbar ball rolls 2 x 10ea 2 x 10    Standing hip abduction 2 x 10  ea -    Calf raises 2 x 10 ea -    Slantboard calf stretches 3 x 20\" each  -    SAQ  2 x 10 ea    LAQ  3# 2 x 10 ea    Hip add ball squeezes  2 x 15    LTR   2 x 10    SLR  2 x 10 ea    Heel slides  2 x 10 ea    Hip abduction   Hook-lying, blue theraband 2 x 15                        HEP: Pt instructed to continue with existing HEP. Treatment/Session Assessment:    · Response to Treatment:  Focused treatment on LE strength and hip musculature secondary to patient's reluctance to receive any direct back interventions manually or with exercise. Trunk strengthening integrated into hip exercises for improved lumbar symptoms. Did not perform standing exercises today due to patient experiencing increased discomfort following stnading therex last treatment. Pt wore back brace to today's appointment. · Compliance with Program/Exercises: Compliant with attendance. Patient reluctant to participate in trunk strengthening exercises and manual therapy interventions. · Recommendations/Intent for next treatment session: \"Next visit will focus on decreased apprehension with treatment and increased symptom centralization. \".   Total Treatment Duration:31 minutes  Time In: 7655  Time Out: John Kay, PT

## 2017-07-26 ENCOUNTER — HOSPITAL ENCOUNTER (OUTPATIENT)
Dept: PHYSICAL THERAPY | Age: 42
Discharge: HOME | End: 2017-07-26
Payer: COMMERCIAL

## 2017-07-26 PROCEDURE — 97110 THERAPEUTIC EXERCISES: CPT

## 2017-07-26 NOTE — PROGRESS NOTES
Juan Marlow  : 1975 [facility] KRANTHI PLASCENCIA  [address] [place] [address], [address] [place] [address]  [phone]   [phone]         OUTPATIENT PHYSICAL THERAPY:Daily Note 2017    ICD-10: Treatment Diagnosis: Low back pain (M54.5), Spinal stenosis, lumbar region (M48.06), Other intervertebral disc degeneration, lumbosacral region (M51.37)  Precautions/Allergies:   Tramadol   Fall Risk Score: 0 (? 5 = High Risk)  MD Orders: Evaluate and treat, modalities, core strengthening and stretching (17) MEDICAL/REFERRING DIAGNOSIS:  Spinal stenosis of lumbar region with neurogenic claudication, degeneration of lumbosacral intervertebral disc   DATE OF ONSET: \"Years ago\"  REFERRING PHYSICIAN: Maddie Wei MD  RETURN PHYSICIAN APPOINTMENT: TBD; pt unsure     INITIAL ASSESSMENT:  Ms. Valente Iqbal presents to PT with complaints of lower back pain which affects both legs which is worse on the L today. Patient demonstrates apprehension to perform movements involving lower back, increased pain and radicular pain into L LE. Patient's impairments limit her ability to perform work tasks, limit her ability to ambulate for long distances and generally reduce her activity participation overall. Patient will benefit from skilled PT intervention to reduce her discomfort and maximize her physical capabilities to improve function. PROBLEM LIST (Impacting functional limitations):  1. Decreased Strength  2. Increased Pain  3. Decreased Activity Tolerance  4. Decreased Flexibility/Joint Mobility INTERVENTIONS PLANNED:  1. Heat  2. Home Exercise Program (HEP)  3. Manual Therapy  4. Range of Motion (ROM)  5. Therapeutic Exercise/Strengthening   TREATMENT PLAN:  Effective Dates: 17 TO 17. Frequency/Duration: 2-3 visits per week for 6 weeks\"}  GOALS: (Goals have been discussed and agreed upon with patient.)  Short-Term Functional Goals: Time Frame: 3 weeks  1.  Pt will report 3-4 out of 10 pain in legs and lower back and improve score on Oswestry Disability Index to 30% to indicate improved subjective report of function. 2. Patient will report being able to sleep without interference for >3 nights per week from low back and/or leg symptoms. 3. Patient will tolerate 30 minutes of physical therapy treatment without exacerbation of low back and leg symptoms. 4. Patient will be independent and compliant with home exercise program.  Discharge Goals: Time Frame: 6 weeks  1. Patient will report no greater than 4 out of 10 low back and leg pain at worst  2. Patient will be able to ambulate on treadmill for greater than 30 minutes without having to cease activity due to low back and/or leg pain. 3. Patient will report being able to walk around mall without increased lower back pain. 4. Patient will be improve score on Oswestry Disability index to 20% to indicate improved function. 5. Pt will be independent with advanced HEP for trunk stabilization. Rehabilitation Potential For Stated Goals: Fair, guarded    Regarding [facility], I certify that the treatment plan above will be carried out by a therapist or under their direction. Thank you for this referral,    Nato Rm, PT       Referring Physician Signature:     Beryle Laurence, MD              Date                    The information in this section was collected on 7-18-17 (except where otherwise noted). HISTORY:   History of Present Injury/Illness (Reason for Referral):  Pt reports that she has had lower back pain for 'years'. She has had physical therapy in the past and multiple injections which were all unsuccessful at resolving symptoms. Patient states that she has pain that radiates into both legs to the ankle and incapacitates her to the point that she has to miss work (approximately 3-4 times per month). States that she is unable to walk on treadmill for >10 minutes without having to quit due to back pain. Reports that when her symptoms are aggravated she stays in bed in fetal position with her son taking care of her. Past Medical History/Comorbidities:   Ms. Jericho Gutierrez  has a past medical history of Acid reflux; Anemia; Anxiety; Arthralgia of multiple sites; Asthma; Bipolar 1 disorder ([address] Chronic pain; Depressive disorder; Genital herpes; MRSA (methicillin resistant staph aureus) culture positive; and Spinal stenosis. She also has no past medical history of Adverse effect of anesthesia; Difficult intubation; Malignant hyperthermia due to anesthesia; Nausea & vomiting; or Pseudocholinesterase deficiency. Ms. Jericho Gutierrez  has a past surgical history that includes tubal ligation. Social History/Living Environment:     Patient living with friend in apartment with four steps to enter. Patient's son also lives with her. 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 Current Medications:       Current Outpatient Prescriptions:     meloxicam (MOBIC) 7.5 mg tablet, Take 1 Tab by mouth two (2) times daily (with meals). , Disp: 60 Tab, Rfl: 2    gabapentin (NEURONTIN) 600 mg tablet, Take 0.5 Tabs by mouth three (3) times daily. , Disp: 90 Tab, Rfl: 2    montelukast (SINGULAIR) 10 mg tablet, Take 1 Tab by mouth daily. , Disp: 30 Tab, Rfl: 2    SUMAtriptan (IMITREX) 50 mg tablet, Take 1 Tab by mouth once as needed for Migraine (may repeat after 2 hours if needed, max 2 tablets in 24 hours). , Disp: 9 Tab, Rfl: 2    furosemide (LASIX) 20 mg tablet, Take 1 Tab by mouth daily as needed. , Disp: 30 Tab, Rfl: 1    ADVAIR DISKUS 250-50 mcg/dose diskus inhaler, Take 1 Puff by inhalation two (2) times a day., Disp: 1 Inhaler, Rfl: 2    promethazine (PHENERGAN) 25 mg tablet, Take 1 Tab by mouth every six (6) hours as needed for Nausea (associated with migraines). , Disp: 30 Tab, Rfl: 0    raNITIdine (ZANTAC) 300 mg tablet, Take 1 Tab by mouth daily. , Disp: 30 Tab, Rfl: 2    aspirin (ASPIRIN) 325 mg tablet, Take 325 mg by mouth., Disp: , Rfl:     nitroglycerin (NITROSTAT) 0.4 mg SL tablet, 0.4 mg by SubLINGual route., Disp: , Rfl:     albuterol (PROVENTIL HFA, VENTOLIN HFA, PROAIR HFA) 90 mcg/actuation inhaler, Take 2 Puffs by inhalation. , Disp: , Rfl:     acyclovir (ZOVIRAX) 400 mg tablet, TAKE 1 TABLET BY MOUTH TWICE A DAY, Disp: , Rfl: 12    clonazePAM (KLONOPIN) 1 mg tablet, Take 1 mg by mouth two (2) times a day., Disp: , Rfl:     cyclobenzaprine (FLEXERIL) 10 mg tablet, Take  by mouth three (3) times daily as needed for Muscle Spasm(s). , Disp: , Rfl:    Date Last Reviewed:  7-1817   Number of Personal Factors/Comorbidities that affect the Plan of Care: 1-2: MODERATE COMPLEXITY   EXAMINATION:   Observation/Orthostatic Postural Assessment:          Patient leans to the R during subjective portion of evaluation. Ambulates slowly with increased upright trunk posture during gait. Patient is very hesitant to perform ROM measures for lower back. Palpation:          Apprehension with palpation of lumbar paraspinals. Muscles felt tight and guarded but palpation was limited to mild, light touch due to patient apprehension. ROM:          Patient declined to perform lumbar flexion ROM stating that she did not want to 'aggravate' anything. Lumbar extension limited to 25% or normal ROM but did not reproduce pain. Strength:          R LE: hip flexion, IR/ER and abduction 5/5, knee flexion/ext 5/5, ankle DF/PF 5/5        L LE: hip flexion, IR/ER and abduction 5/5, knee flex/ext 5/5, ankle DF/PF 5/5  Special Tests:          Slump test negative bilaterally, inconclusive straight leg raise bilaterally secondary to apprehension with movement  Neurological Screen:  Normal sensation to light touch bilaterally, reflexes normal at S2 bilaterally, mildly reduced L3-4 on L, normal on R       Body Structures Involved:  1. Nerves  2. Bones  3. Joints  4.  Muscles Body Functions Affected:  1. Sensory/Pain  2. Neuromusculoskeletal  3. Movement Related Activities and Participation Affected:  1. Mobility  2. Self Care  3. Domestic Life  4. Community, Social and [place]   Number of elements (examined above) that affect the Plan of Care: 4+: HIGH COMPLEXITY   CLINICAL PRESENTATION:   Presentation: Stable and uncomplicated: LOW COMPLEXITY   CLINICAL DECISION MAKING:   Outcome Measure: Tool Used: Modified Oswestry Low Back Pain Questionnaire  Score:  Initial: 24/50  Most Recent: 24/50 (Date: 7-19-17 )   Interpretation of Score: Each section is scored on a 0-5 scale, 5 representing the greatest disability. The scores of each section are added together for a total score of 50. Medical Necessity:   · Skilled intervention continues to be required due to heightened fear of low back movement, increased pain that radiates into B LEs, decreased activity participation secondary to low back pain. .  Reason for Services/Other Comments:  · Patient is hopeful to have surgery and expressed doubts regarding effectiveness of physical therapy. .   Use of outcome tool(s) and clinical judgement create a POC that gives a: Questionable prediction of patient's progress: MODERATE COMPLEXITY            TREATMENT:   (In addition to Assessment/Re-Assessment sessions the following treatments were rendered)  Pre-treatment Symptoms/Complaints:Stated that she is in increased pain today. Stated that she moved boxes for 3-4 hours last night into storage building. Pain: Initial:     7 out of 10 Post Session:  8 out of 10     Manual therapy (0 minutes): Not performed today. Pt denied manual therapy to lower back. Therapeutic exercise (31 minutes): Exercises performed per grid below. Patient required verbal cues and demonstration to perform hip abduction exercises and required encouragement to perform exercises correctly.      Date:  7-20-17 Date:  7-25-17 Date:  7-26-17   Activity/Exercise Parameters Parameters Parameters   Glute sets 2 x 10ea 2x 15 2 x 15   Lumbar ball rolls 2 x 10ea 2 x 10 2 x 10   Standing hip abduction 2 x 10  ea - -   Calf raises 2 x 10 ea - -   Slantboard calf stretches 3 x 20\" each  - -   SAQ  2 x 10 ea 2 x 15 ea   LAQ  3# 2 x 10 ea    Hip add ball squeezes  2 x 15 2 x 15   LTR   2 x 10 2 x 10   SLR  2 x 10 ea 2 x 10 ea   Heel slides  2 x 10 ea 2 x 10 ea   Hip abduction   Hook-lying, blue theraband 2 x 15 Hook-lying, blue theraband 2 x 15                        HEP: Pt instructed to continue with existing HEP. Treatment/Session Assessment:    · Response to Treatment:  Pt declined soft tissue mobilizations to lumbar paraspinals. Patient appeared in increased discomfort this AM. Pt received education on lifting mechanics and to keep objects close to body and to lift with the legs and not the lower back. Patient also educated to make steps when turning while carrying objects instead of twisting at the waist to minimize lower back pain when moving boxes at home. · Compliance with Program/Exercises: Compliant with attendance. Patient reluctant to participate in trunk strengthening exercises and manual therapy interventions. · Recommendations/Intent for next treatment session: \"Next visit will focus on decreased apprehension with treatment and increased symptom centralization. \".   Total Treatment Duration:31 minutes  Time In: 3072  Time Out: John Kay, PT

## 2017-08-01 ENCOUNTER — HOSPITAL ENCOUNTER (OUTPATIENT)
Dept: PHYSICAL THERAPY | Age: 42
Discharge: HOME | End: 2017-08-01
Payer: COMMERCIAL

## 2017-08-01 PROCEDURE — 97110 THERAPEUTIC EXERCISES: CPT

## 2017-08-01 NOTE — PROGRESS NOTES
Arden Nicholson  : 1975 [facility] at [facility] [place] [facility] KRANTHI PLASCENCIA  [address] [place] [address],  Diaz Alejandre.  [phone]   [phone]         OUTPATIENT PHYSICAL THERAPY:Daily Note 2017    ICD-10: Treatment Diagnosis: Low back pain (M54.5), Spinal stenosis, lumbar region (M48.06), Other intervertebral disc degeneration, lumbosacral region (M51.37)  Precautions/Allergies:   Tramadol   Fall Risk Score: 0 (? 5 = High Risk)  MD Orders: Evaluate and treat, modalities, core strengthening and stretching (17) MEDICAL/REFERRING DIAGNOSIS:  Spinal stenosis of lumbar region with neurogenic claudication, degeneration of lumbosacral intervertebral disc   DATE OF ONSET: \"Years ago\"  REFERRING PHYSICIAN: Alis Barraza MD  RETURN PHYSICIAN APPOINTMENT: TBD; pt unsure     INITIAL ASSESSMENT:  Ms. Dante Loera presents to PT with complaints of lower back pain which affects both legs which is worse on the L today. Patient demonstrates apprehension to perform movements involving lower back, increased pain and radicular pain into L LE. Patient's impairments limit her ability to perform work tasks, limit her ability to ambulate for long distances and generally reduce her activity participation overall. Patient will benefit from skilled PT intervention to reduce her discomfort and maximize her physical capabilities to improve function. PROBLEM LIST (Impacting functional limitations):  1. Decreased Strength  2. Increased Pain  3. Decreased Activity Tolerance  4. Decreased Flexibility/Joint Mobility INTERVENTIONS PLANNED:  1. Heat  2. Home Exercise Program (HEP)  3. Manual Therapy  4. Range of Motion (ROM)  5. Therapeutic Exercise/Strengthening   TREATMENT PLAN:  Effective Dates: 17 TO 17. Frequency/Duration: 2-3 visits per week for 6 weeks\"}  GOALS: (Goals have been discussed and agreed upon with patient.)  Short-Term Functional Goals: Time Frame: 3 weeks  1.  Pt will report 3-4 out of 10 pain in legs and lower back and improve score on Oswestry Disability Index to 30% to indicate improved subjective report of function. 2. Patient will report being able to sleep without interference for >3 nights per week from low back and/or leg symptoms. 3. Patient will tolerate 30 minutes of physical therapy treatment without exacerbation of low back and leg symptoms. 4. Patient will be independent and compliant with home exercise program.  Discharge Goals: Time Frame: 6 weeks  1. Patient will report no greater than 4 out of 10 low back and leg pain at worst  2. Patient will be able to ambulate on treadmill for greater than 30 minutes without having to cease activity due to low back and/or leg pain. 3. Patient will report being able to walk around mall without increased lower back pain. 4. Patient will be improve score on Oswestry Disability index to 20% to indicate improved function. 5. Pt will be independent with advanced HEP for trunk stabilization. Rehabilitation Potential For Stated Goals: Fair, guarded    Regarding [facility], I certify that the treatment plan above will be carried out by a therapist or under their direction. Thank you for this referral,    Virgie Elam, PT       Referring Physician Signature:     Bakari Tamez MD              Date                    The information in this section was collected on 7-18-17 (except where otherwise noted). HISTORY:   History of Present Injury/Illness (Reason for Referral):  Pt reports that she has had lower back pain for 'years'. She has had physical therapy in the past and multiple injections which were all unsuccessful at resolving symptoms. Patient states that she has pain that radiates into both legs to the ankle and incapacitates her to the point that she has to miss work (approximately 3-4 times per month). States that she is unable to walk on treadmill for >10 minutes without having to quit due to back pain. Reports that when her symptoms are aggravated she stays in bed in fetal position with her son taking care of her. Past Medical History/Comorbidities:   Ms. Etta Alcaraz  has a past medical history of Acid reflux; Anemia; Anxiety; Arthralgia of multiple sites; Asthma; Bipolar 1 disorder ([address] Chronic pain; Depressive disorder; Genital herpes; MRSA (methicillin resistant staph aureus) culture positive; and Spinal stenosis. She also has no past medical history of Adverse effect of anesthesia; Difficult intubation; Malignant hyperthermia due to anesthesia; Nausea & vomiting; or Pseudocholinesterase deficiency. Ms. Etta Alcaraz  has a past surgical history that includes tubal ligation. Social History/Living Environment:     Patient living with friend in apartment with four steps to enter. Patient's son also lives with her. 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 Current Medications:       Current Outpatient Prescriptions:     meloxicam (MOBIC) 7.5 mg tablet, Take 1 Tab by mouth two (2) times daily (with meals). , Disp: 60 Tab, Rfl: 2    gabapentin (NEURONTIN) 600 mg tablet, Take 0.5 Tabs by mouth three (3) times daily. , Disp: 90 Tab, Rfl: 2    montelukast (SINGULAIR) 10 mg tablet, Take 1 Tab by mouth daily. , Disp: 30 Tab, Rfl: 2    SUMAtriptan (IMITREX) 50 mg tablet, Take 1 Tab by mouth once as needed for Migraine (may repeat after 2 hours if needed, max 2 tablets in 24 hours). , Disp: 9 Tab, Rfl: 2    furosemide (LASIX) 20 mg tablet, Take 1 Tab by mouth daily as needed. , Disp: 30 Tab, Rfl: 1    ADVAIR DISKUS 250-50 mcg/dose diskus inhaler, Take 1 Puff by inhalation two (2) times a day., Disp: 1 Inhaler, Rfl: 2    promethazine (PHENERGAN) 25 mg tablet, Take 1 Tab by mouth every six (6) hours as needed for Nausea (associated with migraines). , Disp: 30 Tab, Rfl: 0    raNITIdine (ZANTAC) 300 mg tablet, Take 1 Tab by mouth daily. , Disp: 30 Tab, Rfl: 2    aspirin (ASPIRIN) 325 mg tablet, Take 325 mg by mouth., Disp: , Rfl:     nitroglycerin (NITROSTAT) 0.4 mg SL tablet, 0.4 mg by SubLINGual route., Disp: , Rfl:     albuterol (PROVENTIL HFA, VENTOLIN HFA, PROAIR HFA) 90 mcg/actuation inhaler, Take 2 Puffs by inhalation. , Disp: , Rfl:     acyclovir (ZOVIRAX) 400 mg tablet, TAKE 1 TABLET BY MOUTH TWICE A DAY, Disp: , Rfl: 12    clonazePAM (KLONOPIN) 1 mg tablet, Take 1 mg by mouth two (2) times a day., Disp: , Rfl:     cyclobenzaprine (FLEXERIL) 10 mg tablet, Take  by mouth three (3) times daily as needed for Muscle Spasm(s). , Disp: , Rfl:    Date Last Reviewed:  7-1817   Number of Personal Factors/Comorbidities that affect the Plan of Care: 1-2: MODERATE COMPLEXITY   EXAMINATION:   Observation/Orthostatic Postural Assessment:          Patient leans to the R during subjective portion of evaluation. Ambulates slowly with increased upright trunk posture during gait. Patient is very hesitant to perform ROM measures for lower back. Palpation:          Apprehension with palpation of lumbar paraspinals. Muscles felt tight and guarded but palpation was limited to mild, light touch due to patient apprehension. ROM:          Patient declined to perform lumbar flexion ROM stating that she did not want to 'aggravate' anything. Lumbar extension limited to 25% or normal ROM but did not reproduce pain. Strength:          R LE: hip flexion, IR/ER and abduction 5/5, knee flexion/ext 5/5, ankle DF/PF 5/5        L LE: hip flexion, IR/ER and abduction 5/5, knee flex/ext 5/5, ankle DF/PF 5/5  Special Tests:          Slump test negative bilaterally, inconclusive straight leg raise bilaterally secondary to apprehension with movement  Neurological Screen:  Normal sensation to light touch bilaterally, reflexes normal at S2 bilaterally, mildly reduced L3-4 on L, normal on R       Body Structures Involved:  1. Nerves  2. Bones  3. Joints  4.  Muscles Body Functions Affected:  1. Sensory/Pain  2. Neuromusculoskeletal  3. Movement Related Activities and Participation Affected:  1. Mobility  2. Self Care  3. Domestic Life  4. Community, Social and [place]   Number of elements (examined above) that affect the Plan of Care: 4+: HIGH COMPLEXITY   CLINICAL PRESENTATION:   Presentation: Stable and uncomplicated: LOW COMPLEXITY   CLINICAL DECISION MAKING:   Outcome Measure: Tool Used: Modified Oswestry Low Back Pain Questionnaire  Score:  Initial: 24/50  Most Recent: 24/50 (Date: 7-19-17 )   Interpretation of Score: Each section is scored on a 0-5 scale, 5 representing the greatest disability. The scores of each section are added together for a total score of 50. Medical Necessity:   · Skilled intervention continues to be required due to heightened fear of low back movement, increased pain that radiates into B LEs, decreased activity participation secondary to low back pain. .  Reason for Services/Other Comments:  · Patient is hopeful to have surgery and expressed doubts regarding effectiveness of physical therapy. .   Use of outcome tool(s) and clinical judgement create a POC that gives a: Questionable prediction of patient's progress: MODERATE COMPLEXITY            TREATMENT:   (In addition to Assessment/Re-Assessment sessions the following treatments were rendered)  Pre-treatment Symptoms/Complaints: \"You're not touching my back. \" Pt reported that her back was doing \"ok\". Stated that she has been emotionally distraught lately secondary to her housing situation. Pain: Initial:     6 out of 10 Post Session:  \"It's worse now\" after PT     Manual therapy (0 minutes): Not performed today. Pt denied manual therapy to lower back. Therapeutic exercise (35 minutes): Exercises performed per grid below. Patient required verbal cues and demonstration to perform hip abduction exercises and required encouragement to perform exercises correctly.  Cued to use staggered stance when performing standing resisted rows to minimize low back pain. Date:  7-20-17 Date:  7-25-17 Date:  7-26-17 Date  8-1-17   Activity/Exercise Parameters Parameters Parameters    Glute sets 2 x 10ea 2x 15 2 x 15 2 x 15   Lumbar ball rolls 2 x 10ea 2 x 10 2 x 10 1 x 20   Standing hip abduction 2 x 10  ea - - -   Calf raises 2 x 10 ea - - -   Slantboard calf stretches 3 x 20\" each  - - -   SAQ  2 x 10 ea 2 x 15 ea 3# 2 x 15   LAQ  3# 2 x 10 ea  3# 2 x 15   Hip add ball squeezes  2 x 15 2 x 15 1  x20   LTR   2 x 10 2 x 10 2 x15   SLR  2 x 10 ea 2 x 10 ea 2 x 10 ea   Heel slides  2 x 10 ea 2 x 10 ea -   Hip abduction   Hook-lying, blue theraband 2 x 15 Hook-lying, blue theraband 2 x 15 Hook-lying blue theraband 2 x 20   Clamshells    Blue x 20 ea   Scapular retractions    Green 2 x 10             HEP: Pt instructed to continue with existing HEP. Treatment/Session Assessment:    · Response to Treatment:  Pt continues to be very hesitant to have any direct manual treatment applied to lower back. Patient able to perform therapeutic exercises without obvious increase in discomfort. Patient appeared more willing to try new exercises today. · Compliance with Program/Exercises: Compliant with attendance. · Recommendations/Intent for next treatment session: \"Next visit will focus on decreased apprehension with treatment and increased symptom centralization. \".   Total Treatment Duration:    Dary Ceja, PT

## 2017-08-02 ENCOUNTER — HOSPITAL ENCOUNTER (OUTPATIENT)
Dept: PHYSICAL THERAPY | Age: 42
Discharge: HOME | End: 2017-08-02
Payer: COMMERCIAL

## 2017-08-02 PROCEDURE — 97110 THERAPEUTIC EXERCISES: CPT

## 2017-08-02 NOTE — PROGRESS NOTES
Bon Pires  : 1975 [facility] KRANTHI PLASCENCIA  [address] [place] [address],  Diaz Alejandre.  [phone]   [phone]         OUTPATIENT PHYSICAL THERAPY:Daily Note 2017    ICD-10: Treatment Diagnosis: Low back pain (M54.5), Spinal stenosis, lumbar region (M48.06), Other intervertebral disc degeneration, lumbosacral region (M51.37)  Precautions/Allergies:   Tramadol   Fall Risk Score: 0 (? 5 = High Risk)  MD Orders: Evaluate and treat, modalities, core strengthening and stretching (17) MEDICAL/REFERRING DIAGNOSIS:  Spinal stenosis of lumbar region with neurogenic claudication, degeneration of lumbosacral intervertebral disc   DATE OF ONSET: \"Years ago\"  REFERRING PHYSICIAN: Steff Moore MD  RETURN PHYSICIAN APPOINTMENT: TBD; pt unsure     INITIAL ASSESSMENT:  Ms. Indigo Tinoco presents to PT with complaints of lower back pain which affects both legs which is worse on the L today. Patient demonstrates apprehension to perform movements involving lower back, increased pain and radicular pain into L LE. Patient's impairments limit her ability to perform work tasks, limit her ability to ambulate for long distances and generally reduce her activity participation overall. Patient will benefit from skilled PT intervention to reduce her discomfort and maximize her physical capabilities to improve function. PROBLEM LIST (Impacting functional limitations):  1. Decreased Strength  2. Increased Pain  3. Decreased Activity Tolerance  4. Decreased Flexibility/Joint Mobility INTERVENTIONS PLANNED:  1. Heat  2. Home Exercise Program (HEP)  3. Manual Therapy  4. Range of Motion (ROM)  5. Therapeutic Exercise/Strengthening   TREATMENT PLAN:  Effective Dates: 17 TO 17. Frequency/Duration: 2-3 visits per week for 6 weeks\"}  GOALS: (Goals have been discussed and agreed upon with patient.)  Short-Term Functional Goals: Time Frame: 3 weeks  1.  Pt will report 3-4 out of 10 pain in legs and lower back and improve score on Oswestry Disability Index to 30% to indicate improved subjective report of function. 2. Patient will report being able to sleep without interference for >3 nights per week from low back and/or leg symptoms. 3. Patient will tolerate 30 minutes of physical therapy treatment without exacerbation of low back and leg symptoms. 4. Patient will be independent and compliant with home exercise program.  Discharge Goals: Time Frame: 6 weeks  1. Patient will report no greater than 4 out of 10 low back and leg pain at worst  2. Patient will be able to ambulate on treadmill for greater than 30 minutes without having to cease activity due to low back and/or leg pain. 3. Patient will report being able to walk around mall without increased lower back pain. 4. Patient will be improve score on Oswestry Disability index to 20% to indicate improved function. 5. Pt will be independent with advanced HEP for trunk stabilization. Rehabilitation Potential For Stated Goals: Fair, guarded    Regarding [facility], I certify that the treatment plan above will be carried out by a therapist or under their direction. Thank you for this referral,    Kamran Price, PT       Referring Physician Signature:     Adebayo Eng MD              Date                    The information in this section was collected on 7-18-17 (except where otherwise noted). HISTORY:   History of Present Injury/Illness (Reason for Referral):  Pt reports that she has had lower back pain for 'years'. She has had physical therapy in the past and multiple injections which were all unsuccessful at resolving symptoms. Patient states that she has pain that radiates into both legs to the ankle and incapacitates her to the point that she has to miss work (approximately 3-4 times per month). States that she is unable to walk on treadmill for >10 minutes without having to quit due to back pain. Reports that when her symptoms are aggravated she stays in bed in fetal position with her son taking care of her. Past Medical History/Comorbidities:   Ms. Denice Holcomb  has a past medical history of Acid reflux; Anemia; Anxiety; Arthralgia of multiple sites; Asthma; Bipolar 1 disorder ([address] Chronic pain; Depressive disorder; Genital herpes; MRSA (methicillin resistant staph aureus) culture positive; and Spinal stenosis. She also has no past medical history of Adverse effect of anesthesia; Difficult intubation; Malignant hyperthermia due to anesthesia; Nausea & vomiting; or Pseudocholinesterase deficiency. Ms. Denice Holcomb  has a past surgical history that includes tubal ligation. Social History/Living Environment:     Patient living with friend in apartment with four steps to enter. Patient's son also lives with her. 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 Current Medications:       Current Outpatient Prescriptions:     meloxicam (MOBIC) 7.5 mg tablet, Take 1 Tab by mouth two (2) times daily (with meals). , Disp: 60 Tab, Rfl: 2    gabapentin (NEURONTIN) 600 mg tablet, Take 0.5 Tabs by mouth three (3) times daily. , Disp: 90 Tab, Rfl: 2    montelukast (SINGULAIR) 10 mg tablet, Take 1 Tab by mouth daily. , Disp: 30 Tab, Rfl: 2    SUMAtriptan (IMITREX) 50 mg tablet, Take 1 Tab by mouth once as needed for Migraine (may repeat after 2 hours if needed, max 2 tablets in 24 hours). , Disp: 9 Tab, Rfl: 2    furosemide (LASIX) 20 mg tablet, Take 1 Tab by mouth daily as needed. , Disp: 30 Tab, Rfl: 1    ADVAIR DISKUS 250-50 mcg/dose diskus inhaler, Take 1 Puff by inhalation two (2) times a day., Disp: 1 Inhaler, Rfl: 2    promethazine (PHENERGAN) 25 mg tablet, Take 1 Tab by mouth every six (6) hours as needed for Nausea (associated with migraines). , Disp: 30 Tab, Rfl: 0    raNITIdine (ZANTAC) 300 mg tablet, Take 1 Tab by mouth daily. , Disp: 30 Tab, Rfl: 2    aspirin (ASPIRIN) 325 mg tablet, Take 325 mg by mouth., Disp: , Rfl:     nitroglycerin (NITROSTAT) 0.4 mg SL tablet, 0.4 mg by SubLINGual route., Disp: , Rfl:     albuterol (PROVENTIL HFA, VENTOLIN HFA, PROAIR HFA) 90 mcg/actuation inhaler, Take 2 Puffs by inhalation. , Disp: , Rfl:     acyclovir (ZOVIRAX) 400 mg tablet, TAKE 1 TABLET BY MOUTH TWICE A DAY, Disp: , Rfl: 12    clonazePAM (KLONOPIN) 1 mg tablet, Take 1 mg by mouth two (2) times a day., Disp: , Rfl:     cyclobenzaprine (FLEXERIL) 10 mg tablet, Take  by mouth three (3) times daily as needed for Muscle Spasm(s). , Disp: , Rfl:    Date Last Reviewed:  7-1817   Number of Personal Factors/Comorbidities that affect the Plan of Care: 1-2: MODERATE COMPLEXITY   EXAMINATION:   Observation/Orthostatic Postural Assessment:          Patient leans to the R during subjective portion of evaluation. Ambulates slowly with increased upright trunk posture during gait. Patient is very hesitant to perform ROM measures for lower back. Palpation:          Apprehension with palpation of lumbar paraspinals. Muscles felt tight and guarded but palpation was limited to mild, light touch due to patient apprehension. ROM:          Patient declined to perform lumbar flexion ROM stating that she did not want to 'aggravate' anything. Lumbar extension limited to 25% or normal ROM but did not reproduce pain. Strength:          R LE: hip flexion, IR/ER and abduction 5/5, knee flexion/ext 5/5, ankle DF/PF 5/5        L LE: hip flexion, IR/ER and abduction 5/5, knee flex/ext 5/5, ankle DF/PF 5/5  Special Tests:          Slump test negative bilaterally, inconclusive straight leg raise bilaterally secondary to apprehension with movement  Neurological Screen:  Normal sensation to light touch bilaterally, reflexes normal at S2 bilaterally, mildly reduced L3-4 on L, normal on R       Body Structures Involved:  1. Nerves  2. Bones  3. Joints  4.  Muscles Body Functions Affected:  1. Sensory/Pain  2. Neuromusculoskeletal  3. Movement Related Activities and Participation Affected:  1. Mobility  2. Self Care  3. Domestic Life  4. Community, Social and [place]   Number of elements (examined above) that affect the Plan of Care: 4+: HIGH COMPLEXITY   CLINICAL PRESENTATION:   Presentation: Stable and uncomplicated: LOW COMPLEXITY   CLINICAL DECISION MAKING:   Outcome Measure: Tool Used: Modified Oswestry Low Back Pain Questionnaire  Score:  Initial: 24/50  Most Recent: 24/50 (Date: 7-19-17 )   Interpretation of Score: Each section is scored on a 0-5 scale, 5 representing the greatest disability. The scores of each section are added together for a total score of 50. Medical Necessity:   · Skilled intervention continues to be required due to heightened fear of low back movement, increased pain that radiates into B LEs, decreased activity participation secondary to low back pain. .  Reason for Services/Other Comments:  · Patient is hopeful to have surgery and expressed doubts regarding effectiveness of physical therapy. .   Use of outcome tool(s) and clinical judgement create a POC that gives a: Questionable prediction of patient's progress: MODERATE COMPLEXITY            TREATMENT:   (In addition to Assessment/Re-Assessment sessions the following treatments were rendered)  Pre-treatment Symptoms/Complaints: Patient stated that pain is worse today. States that symptoms were aggravated by sexual intercourse on 8/1/17. Pain: Initial:     8 out of 10 Post Session: Stated that her pain was \"worse\" following PT     Manual therapy (0 minutes): Not performed today. Pt denied manual therapy to lower back. Therapeutic exercise (24 minutes): Exercises performed per grid below. Patient performed all exercises in side-lying secondary to increased back pain in supine/hook-lying. Patient required frequent rest breaks secondary to discomfort.      Date:  7-20-17 Date:  7-25-17 Date:  7-26-17 Date  8-1-17 Date  8-2-17   Activity/Exercise Parameters Parameters Parameters     Glute sets 2 x 10ea 2x 15 2 x 15 2 x 15 2 x 15   Lumbar ball rolls 2 x 10ea 2 x 10 2 x 10 1 x 20 -   Standing hip abduction 2 x 10  ea - - - -   Calf raises 2 x 10 ea - - - -   Slantboard calf stretches 3 x 20\" each  - - - -   SAQ  2 x 10 ea 2 x 15 ea 3# 2 x 15 -   LAQ  3# 2 x 10 ea  3# 2 x 15 2 x 10 (side-lying with pad between knees)   Hip add ball squeezes  2 x 15 2 x 15 1  x20 2 x 10   LTR   2 x 10 2 x 10 2 x15 -   SLR  2 x 10 ea 2 x 10 ea 2 x 10 ea -   Heel slides  2 x 10 ea 2 x 10 ea - -   Hip abduction   Hook-lying, blue theraband 2 x 15 Hook-lying, blue theraband 2 x 15 Hook-lying blue theraband 2 x 20 -   Clamshells    Blue x 20 ea 2 x 10 ea   Scapular retractions    Green 2 x 10    Hamstring curls     Side-lying, green band 2 x 10 ea      HEP: No changes made to HEP. Treatment/Session Assessment:    · Response to Treatment:  Pt has visibly more discomfort today upon arrival to PT, with difficulty ambulating without increased antalgia. Pt ambulated with reduced stance time on R LE when ambulating and utilized more upright trunk posture. Pinatient limited with performance of therapeutic exercises today secondary to pain and inability to perform exercises in supine/hook-lying and required all exercises to be performed in side-lying. · Compliance with Program/Exercises: Compliant with attendance. · Recommendations/Intent for next treatment session: \"Next visit will focus on decreased apprehension with treatment and increased symptom centralization. \".   Total Treatment Duration: 24 minutes  Time In: 0900  Time Out: [address], PT

## 2017-08-09 ENCOUNTER — HOSPITAL ENCOUNTER (OUTPATIENT)
Dept: PHYSICAL THERAPY | Age: 42
Discharge: HOME | End: 2017-08-09
Payer: COMMERCIAL

## 2017-08-09 PROCEDURE — 97110 THERAPEUTIC EXERCISES: CPT

## 2017-08-09 NOTE — PROGRESS NOTES
Wyatt Monson  : 1975 [facility]ASCENCIA  [address] [place] [address],  Diaz Alejandre.  [phone]   [phone]         OUTPATIENT PHYSICAL THERAPY:Daily Note 2017    ICD-10: Treatment Diagnosis: Low back pain (M54.5), Spinal stenosis, lumbar region (M48.06), Other intervertebral disc degeneration, lumbosacral region (M51.37)  Precautions/Allergies:   Tramadol   Fall Risk Score: 0 (? 5 = High Risk)  MD Orders: Evaluate and treat, modalities, core strengthening and stretching (17) MEDICAL/REFERRING DIAGNOSIS:  Spinal stenosis of lumbar region with neurogenic claudication, degeneration of lumbosacral intervertebral disc   DATE OF ONSET: \"Years ago\"  REFERRING PHYSICIAN: Kumar Faustin MD  RETURN PHYSICIAN APPOINTMENT: TBD; pt unsure     INITIAL ASSESSMENT:  Ms. Nadya Gardner presents to PT with complaints of lower back pain which affects both legs which is worse on the L today. Patient demonstrates apprehension to perform movements involving lower back, increased pain and radicular pain into L LE. Patient's impairments limit her ability to perform work tasks, limit her ability to ambulate for long distances and generally reduce her activity participation overall. Patient will benefit from skilled PT intervention to reduce her discomfort and maximize her physical capabilities to improve function. PROBLEM LIST (Impacting functional limitations):  1. Decreased Strength  2. Increased Pain  3. Decreased Activity Tolerance  4. Decreased Flexibility/Joint Mobility INTERVENTIONS PLANNED:  1. Heat  2. Home Exercise Program (HEP)  3. Manual Therapy  4. Range of Motion (ROM)  5. Therapeutic Exercise/Strengthening   TREATMENT PLAN:  Effective Dates: 17 TO 17. Frequency/Duration: 2-3 visits per week for 6 weeks\"}  GOALS: (Goals have been discussed and agreed upon with patient.)  Short-Term Functional Goals: Time Frame: 3 weeks  1.  Pt will report 3-4 out of 10 pain in legs and lower back and improve score on Oswestry Disability Index to 30% to indicate improved subjective report of function. 2. Patient will report being able to sleep without interference for >3 nights per week from low back and/or leg symptoms. 3. Patient will tolerate 30 minutes of physical therapy treatment without exacerbation of low back and leg symptoms. 4. Patient will be independent and compliant with home exercise program.  Discharge Goals: Time Frame: 6 weeks  1. Patient will report no greater than 4 out of 10 low back and leg pain at worst  2. Patient will be able to ambulate on treadmill for greater than 30 minutes without having to cease activity due to low back and/or leg pain. 3. Patient will report being able to walk around mall without increased lower back pain. 4. Patient will be improve score on Oswestry Disability index to 20% to indicate improved function. 5. Pt will be independent with advanced HEP for trunk stabilization. Rehabilitation Potential For Stated Goals: Fair, guarded                The information in this section was collected on 7-18-17 (except where otherwise noted). HISTORY:   History of Present Injury/Illness (Reason for Referral):  Pt reports that she has had lower back pain for 'years'. She has had physical therapy in the past and multiple injections which were all unsuccessful at resolving symptoms. Patient states that she has pain that radiates into both legs to the ankle and incapacitates her to the point that she has to miss work (approximately 3-4 times per month). States that she is unable to walk on treadmill for >10 minutes without having to quit due to back pain. Reports that when her symptoms are aggravated she stays in bed in fetal position with her son taking care of her. Past Medical History/Comorbidities:   Ms. Molly Guerrero  has a past medical history of Acid reflux; Anemia; Anxiety; Arthralgia of multiple sites;  Asthma; Bipolar 1 disorder ([facility] Chronic pain; Depressive disorder; Genital herpes; MRSA (methicillin resistant staph aureus) culture positive; and Spinal stenosis. She also has no past medical history of Adverse effect of anesthesia; Difficult intubation; Malignant hyperthermia due to anesthesia; Nausea & vomiting; or Pseudocholinesterase deficiency. Ms. China Elaine  has a past surgical history that includes tubal ligation. Social History/Living Environment:     Patient living with friend in apartment with four steps to enter. Patient's son also lives with her. 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 Current Medications:       Current Outpatient Prescriptions:     QUEtiapine (SEROQUEL) 50 mg tablet, Take 1 Tab by mouth two (2) times a day for 30 days. , Disp: 60 Tab, Rfl: 5    DULoxetine (CYMBALTA) 60 mg capsule, Take 1 Cap by mouth daily for 30 days. , Disp: 30 Cap, Rfl: 5    clonazePAM (KLONOPIN) 1 mg tablet, Take 1 Tab by mouth two (2) times a day for 60 days. Max Daily Amount: 2 mg., Disp: 60 Tab, Rfl: 1    meloxicam (MOBIC) 7.5 mg tablet, Take 1 Tab by mouth two (2) times daily (with meals). , Disp: 60 Tab, Rfl: 2    gabapentin (NEURONTIN) 600 mg tablet, Take 0.5 Tabs by mouth three (3) times daily. , Disp: 90 Tab, Rfl: 2    montelukast (SINGULAIR) 10 mg tablet, Take 1 Tab by mouth daily. , Disp: 30 Tab, Rfl: 2    SUMAtriptan (IMITREX) 50 mg tablet, Take 1 Tab by mouth once as needed for Migraine (may repeat after 2 hours if needed, max 2 tablets in 24 hours). , Disp: 9 Tab, Rfl: 2    furosemide (LASIX) 20 mg tablet, Take 1 Tab by mouth daily as needed. , Disp: 30 Tab, Rfl: 1    ADVAIR DISKUS 250-50 mcg/dose diskus inhaler, Take 1 Puff by inhalation two (2) times a day., Disp: 1 Inhaler, Rfl: 2    promethazine (PHENERGAN) 25 mg tablet, Take 1 Tab by mouth every six (6) hours as needed for Nausea (associated with migraines). , Disp: 30 Tab, Rfl: 0    raNITIdine (ZANTAC) 300 mg tablet, Take 1 Tab by mouth daily. , Disp: 30 Tab, Rfl: 2    aspirin (ASPIRIN) 325 mg tablet, Take 325 mg by mouth., Disp: , Rfl:     nitroglycerin (NITROSTAT) 0.4 mg SL tablet, 0.4 mg by SubLINGual route., Disp: , Rfl:     albuterol (PROVENTIL HFA, VENTOLIN HFA, PROAIR HFA) 90 mcg/actuation inhaler, Take 2 Puffs by inhalation. , Disp: , Rfl:     acyclovir (ZOVIRAX) 400 mg tablet, TAKE 1 TABLET BY MOUTH TWICE A DAY, Disp: , Rfl: 12    cyclobenzaprine (FLEXERIL) 10 mg tablet, Take  by mouth three (3) times daily as needed for Muscle Spasm(s). , Disp: , Rfl:    Date Last Reviewed:  7-1817   Number of Personal Factors/Comorbidities that affect the Plan of Care: 1-2: MODERATE COMPLEXITY   EXAMINATION:   Observation/Orthostatic Postural Assessment:          Patient leans to the R during subjective portion of evaluation. Ambulates slowly with increased upright trunk posture during gait. Patient is very hesitant to perform ROM measures for lower back. Palpation:          Apprehension with palpation of lumbar paraspinals. Muscles felt tight and guarded but palpation was limited to mild, light touch due to patient apprehension. ROM:          Patient declined to perform lumbar flexion ROM stating that she did not want to 'aggravate' anything. Lumbar extension limited to 25% or normal ROM but did not reproduce pain. Strength:          R LE: hip flexion, IR/ER and abduction 5/5, knee flexion/ext 5/5, ankle DF/PF 5/5        L LE: hip flexion, IR/ER and abduction 5/5, knee flex/ext 5/5, ankle DF/PF 5/5  Special Tests:          Slump test negative bilaterally, inconclusive straight leg raise bilaterally secondary to apprehension with movement  Neurological Screen:  Normal sensation to light touch bilaterally, reflexes normal at S2 bilaterally, mildly reduced L3-4 on L, normal on R       Body Structures Involved:  1. Nerves  2. Bones  3. Joints  4.  Muscles Body Functions Affected:  1. Sensory/Pain  2. Neuromusculoskeletal  3. Movement Related Activities and Participation Affected:  1. Mobility  2. Self Care  3. Domestic Life  4. Community, Social and [place]   Number of elements (examined above) that affect the Plan of Care: 4+: HIGH COMPLEXITY   CLINICAL PRESENTATION:   Presentation: Stable and uncomplicated: LOW COMPLEXITY   CLINICAL DECISION MAKING:   Outcome Measure: Tool Used: Modified Oswestry Low Back Pain Questionnaire  Score:  Initial: 24/50  Most Recent: 24/50 (Date: 7-19-17 )   Interpretation of Score: Each section is scored on a 0-5 scale, 5 representing the greatest disability. The scores of each section are added together for a total score of 50. Medical Necessity:   · Skilled intervention continues to be required due to heightened fear of low back movement, increased pain that radiates into B LEs, decreased activity participation secondary to low back pain. .  Reason for Services/Other Comments:  · Patient is hopeful to have surgery and expressed doubts regarding effectiveness of physical therapy. .   Use of outcome tool(s) and clinical judgement create a POC that gives a: Questionable prediction of patient's progress: MODERATE COMPLEXITY            TREATMENT:   (In addition to Assessment/Re-Assessment sessions the following treatments were rendered)  Pre-treatment Symptoms/Complaints: Patient stated that she feels better today than she did at her last PT treatment. Pain: Initial:     6-7 out of 10 Post Session: Stated an increase in pain after PT     Manual therapy (0 minutes): Not performed today. Pt request no low back manual interventions. Therapeutic exercise (27 minutes): Exercises performed per grid below. Attempted single knee-to-chest stretch but patient declined to continue with exercise after initial repetition stating that it was too uncomfortable.      Date:  7-20-17 Date:  7-25-17 Date:  7-26-17 Date  8-1-17 Date  8-2-17 Date  8-9-17 Activity/Exercise Parameters Parameters Parameters      Glute sets 2 x 10ea 2x 15 2 x 15 2 x 15 2 x 15 2 x 20   Lumbar ball rolls 2 x 10ea 2 x 10 2 x 10 1 x 20 - 2 x 20   Standing hip abduction 2 x 10  ea - - - - -   Calf raises 2 x 10 ea - - - - -   Slantboard calf stretches 3 x 20\" each  - - - - -   SAQ  2 x 10 ea 2 x 15 ea 3# 2 x 15 - 3# 2 x 15   LAQ  3# 2 x 10 ea  3# 2 x 15 2 x 10 (side-lying with pad between knees) -   Hip add ball squeezes  2 x 15 2 x 15 1  x20 2 x 10 2 x 20   LTR   2 x 10 2 x 10 2 x15 - 2 x 20 with B LEs on swiss ball   SLR  2 x 10 ea 2 x 10 ea 2 x 10 ea - 2 x 15 ea   Heel slides  2 x 10 ea 2 x 10 ea - - -   Hip abduction   Hook-lying, blue theraband 2 x 15 Hook-lying, blue theraband 2 x 15 Hook-lying blue theraband 2 x 20 - -   Clamshells    Blue x 20 ea 2 x 10 ea 2 x 15 each, blue   Scapular retractions    Green 2 x 10  Red 2 x 15   Hamstring curls     Side-lying, green band 2 x 10 ea -   Posterior pelvic tilts      2 x 10      HEP: No changes made to HEP. Treatment/Session Assessment:    · Response to Treatment:  Pt demonstrated less pain than at last visit. Pt had difficulty engaging transversus abdominus with posterior pelvic tilts. Patient appeared to move onto/off of treatment table better this PM than she did at previous treatment. Patient received education on the purpose of flexion-based exercises to reduce stenosis symptoms. · Compliance with Program/Exercises: Compliant with attendance. · Recommendations/Intent for next treatment session: \"Next visit will focus on performing posterior pelvic tilts appropriately. \".   Total Treatment Duration: 27 minutes  PT Patient Time In/Time Out  Time In: 4495  Time Out: [address], PT

## 2017-08-10 ENCOUNTER — HOSPITAL ENCOUNTER (OUTPATIENT)
Dept: PHYSICAL THERAPY | Age: 42
Discharge: HOME | End: 2017-08-10
Payer: COMMERCIAL

## 2017-08-10 PROCEDURE — 97110 THERAPEUTIC EXERCISES: CPT

## 2017-08-10 NOTE — PROGRESS NOTES
Louis Polo  : 1975 [facility] KRANTHI PLASCENCIA  [address] [place] [address],  Diaz Alejandre.  [phone]   [phone]         OUTPATIENT PHYSICAL THERAPY:Daily Note 8/10/2017    ICD-10: Treatment Diagnosis: Low back pain (M54.5), Spinal stenosis, lumbar region (M48.06), Other intervertebral disc degeneration, lumbosacral region (M51.37)  Precautions/Allergies:   Tramadol   Fall Risk Score: 0 (? 5 = High Risk)  MD Orders: Evaluate and treat, modalities, core strengthening and stretching (17) MEDICAL/REFERRING DIAGNOSIS:  Spinal stenosis of lumbar region with neurogenic claudication, degeneration of lumbosacral intervertebral disc   DATE OF ONSET: \"Years ago\"  REFERRING PHYSICIAN: Jaqueline Mixon MD  RETURN PHYSICIAN APPOINTMENT: TBD; pt unsure     INITIAL ASSESSMENT:  Ms. Rajesh Yo presents to PT with complaints of lower back pain which affects both legs which is worse on the L today. Patient demonstrates apprehension to perform movements involving lower back, increased pain and radicular pain into L LE. Patient's impairments limit her ability to perform work tasks, limit her ability to ambulate for long distances and generally reduce her activity participation overall. Patient will benefit from skilled PT intervention to reduce her discomfort and maximize her physical capabilities to improve function. PROBLEM LIST (Impacting functional limitations):  1. Decreased Strength  2. Increased Pain  3. Decreased Activity Tolerance  4. Decreased Flexibility/Joint Mobility INTERVENTIONS PLANNED:  1. Heat  2. Home Exercise Program (HEP)  3. Manual Therapy  4. Range of Motion (ROM)  5. Therapeutic Exercise/Strengthening   TREATMENT PLAN:  Effective Dates: 17 TO 17. Frequency/Duration: 2-3 visits per week for 6 weeks\"}  GOALS: (Goals have been discussed and agreed upon with patient.)  Short-Term Functional Goals: Time Frame: 3 weeks  1.  Pt will report 3-4 out of 10 pain in legs and lower back and improve score on Oswestry Disability Index to 30% to indicate improved subjective report of function. 2. Patient will report being able to sleep without interference for >3 nights per week from low back and/or leg symptoms. 3. Patient will tolerate 30 minutes of physical therapy treatment without exacerbation of low back and leg symptoms. 4. Patient will be independent and compliant with home exercise program.  Discharge Goals: Time Frame: 6 weeks  1. Patient will report no greater than 4 out of 10 low back and leg pain at worst  2. Patient will be able to ambulate on treadmill for greater than 30 minutes without having to cease activity due to low back and/or leg pain. 3. Patient will report being able to walk around mall without increased lower back pain. 4. Patient will be improve score on Oswestry Disability index to 20% to indicate improved function. 5. Pt will be independent with advanced HEP for trunk stabilization. Rehabilitation Potential For Stated Goals: Fair, guarded                The information in this section was collected on 7-18-17 (except where otherwise noted). HISTORY:   History of Present Injury/Illness (Reason for Referral):  Pt reports that she has had lower back pain for 'years'. She has had physical therapy in the past and multiple injections which were all unsuccessful at resolving symptoms. Patient states that she has pain that radiates into both legs to the ankle and incapacitates her to the point that she has to miss work (approximately 3-4 times per month). States that she is unable to walk on treadmill for >10 minutes without having to quit due to back pain. Reports that when her symptoms are aggravated she stays in bed in fetal position with her son taking care of her. Past Medical History/Comorbidities:   Ms. Jericho Gutierrez  has a past medical history of Acid reflux; Anemia; Anxiety; Arthralgia of multiple sites;  Asthma; B[facility] 1 disorder ([facility] Chronic pain; Depressive disorder; Genital herpes; MRSA (methicillin resistant staph aureus) culture positive; and Spinal stenosis. She also has no past medical history of Adverse effect of anesthesia; Difficult intubation; Malignant hyperthermia due to anesthesia; Nausea & vomiting; or Pseudocholinesterase deficiency. Ms. Wu Lopez  has a past surgical history that includes tubal ligation. Social History/Living Environment:     Patient living with friend in apartment with four steps to enter. Patient's son also lives with her. 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 Current Medications:       Current Outpatient Prescriptions:     QUEtiapine (SEROQUEL) 50 mg tablet, Take 1 Tab by mouth two (2) times a day for 30 days. , Disp: 60 Tab, Rfl: 5    DULoxetine (CYMBALTA) 60 mg capsule, Take 1 Cap by mouth daily for 30 days. , Disp: 30 Cap, Rfl: 5    clonazePAM (KLONOPIN) 1 mg tablet, Take 1 Tab by mouth two (2) times a day for 60 days. Max Daily Amount: 2 mg., Disp: 60 Tab, Rfl: 1    meloxicam (MOBIC) 7.5 mg tablet, Take 1 Tab by mouth two (2) times daily (with meals). , Disp: 60 Tab, Rfl: 2    gabapentin (NEURONTIN) 600 mg tablet, Take 0.5 Tabs by mouth three (3) times daily. , Disp: 90 Tab, Rfl: 2    montelukast (SINGULAIR) 10 mg tablet, Take 1 Tab by mouth daily. , Disp: 30 Tab, Rfl: 2    SUMAtriptan (IMITREX) 50 mg tablet, Take 1 Tab by mouth once as needed for Migraine (may repeat after 2 hours if needed, max 2 tablets in 24 hours). , Disp: 9 Tab, Rfl: 2    furosemide (LASIX) 20 mg tablet, Take 1 Tab by mouth daily as needed. , Disp: 30 Tab, Rfl: 1    ADVAIR DISKUS 250-50 mcg/dose diskus inhaler, Take 1 Puff by inhalation two (2) times a day., Disp: 1 Inhaler, Rfl: 2    promethazine (PHENERGAN) 25 mg tablet, Take 1 Tab by mouth every six (6) hours as needed for Nausea (associated with migraines). , Disp: 30 Tab, Rfl: 0    raNITIdine (ZANTAC) 300 mg tablet, Take 1 Tab by mouth daily. , Disp: 30 Tab, Rfl: 2    aspirin (ASPIRIN) 325 mg tablet, Take 325 mg by mouth., Disp: , Rfl:     nitroglycerin (NITROSTAT) 0.4 mg SL tablet, 0.4 mg by SubLINGual route., Disp: , Rfl:     albuterol (PROVENTIL HFA, VENTOLIN HFA, PROAIR HFA) 90 mcg/actuation inhaler, Take 2 Puffs by inhalation. , Disp: , Rfl:     acyclovir (ZOVIRAX) 400 mg tablet, TAKE 1 TABLET BY MOUTH TWICE A DAY, Disp: , Rfl: 12    cyclobenzaprine (FLEXERIL) 10 mg tablet, Take  by mouth three (3) times daily as needed for Muscle Spasm(s). , Disp: , Rfl:    Date Last Reviewed:  7-1817   Number of Personal Factors/Comorbidities that affect the Plan of Care: 1-2: MODERATE COMPLEXITY   EXAMINATION:   Observation/Orthostatic Postural Assessment:          Patient leans to the R during subjective portion of evaluation. Ambulates slowly with increased upright trunk posture during gait. Patient is very hesitant to perform ROM measures for lower back. Palpation:          Apprehension with palpation of lumbar paraspinals. Muscles felt tight and guarded but palpation was limited to mild, light touch due to patient apprehension. ROM:          Patient declined to perform lumbar flexion ROM stating that she did not want to 'aggravate' anything. Lumbar extension limited to 25% or normal ROM but did not reproduce pain. Strength:          R LE: hip flexion, IR/ER and abduction 5/5, knee flexion/ext 5/5, ankle DF/PF 5/5        L LE: hip flexion, IR/ER and abduction 5/5, knee flex/ext 5/5, ankle DF/PF 5/5  Special Tests:          Slump test negative bilaterally, inconclusive straight leg raise bilaterally secondary to apprehension with movement  Neurological Screen:  Normal sensation to light touch bilaterally, reflexes normal at S2 bilaterally, mildly reduced L3-4 on L, normal on R       Body Structures Involved:  1. Nerves  2. Bones  3. Joints  4.  Muscles Body Functions Affected:  1. Sensory/Pain  2. Neuromusculoskeletal  3. Movement Related Activities and Participation Affected:  1. Mobility  2. Self Care  3. Domestic Life  4. Community, Social and [place]   Number of elements (examined above) that affect the Plan of Care: 4+: HIGH COMPLEXITY   CLINICAL PRESENTATION:   Presentation: Stable and uncomplicated: LOW COMPLEXITY   CLINICAL DECISION MAKING:   Outcome Measure: Tool Used: Modified Oswestry Low Back Pain Questionnaire  Score:  Initial: 24/50  Most Recent: 24/50 (Date: 7-19-17 )   Interpretation of Score: Each section is scored on a 0-5 scale, 5 representing the greatest disability. The scores of each section are added together for a total score of 50. Medical Necessity:   · Skilled intervention continues to be required due to heightened fear of low back movement, increased pain that radiates into B LEs, decreased activity participation secondary to low back pain. .  Reason for Services/Other Comments:  · Patient is hopeful to have surgery and expressed doubts regarding effectiveness of physical therapy. .   Use of outcome tool(s) and clinical judgement create a POC that gives a: Questionable prediction of patient's progress: MODERATE COMPLEXITY            TREATMENT:   (In addition to Assessment/Re-Assessment sessions the following treatments were rendered)  Pre-treatment Symptoms/Complaints: Patient stated her back pain is worse today after work activities involving frequent standing, walking and lifting of objects approx 10#. \"How many more of these do I have left? \"  Pain: Initial:     7-8 out of 10 Post Session: \"it's no different\"     Manual therapy (0 minutes): Not performed today. Pt refused low back manual interventions. Therapeutic exercise (28 minutes): Exercises performed per grid below. Patient required verbal cues and demonstration to perform standing therapeutic exercises with theraband for improved posture.      Date:  7-20-17 Date:  7-25-17 Date:  7-26-17 Date  8-1-17 Date  8-2-17 Date  8-9-17 Date  8-10-17   Activity/Exercise Parameters Parameters Parameters       Glute sets 2 x 10ea 2x 15 2 x 15 2 x 15 2 x 15 2 x 20 2 x 20   Lumbar ball rolls 2 x 10ea 2 x 10 2 x 10 1 x 20 - 2 x 20 2 x 20   Standing hip abduction 2 x 10  ea - - - - -    Calf raises 2 x 10 ea - - - - -    Slantboard calf stretches 3 x 20\" each  - - - - -    SAQ  2 x 10 ea 2 x 15 ea 3# 2 x 15 - 3# 2 x 15 4# 2 x 15   LAQ  3# 2 x 10 ea  3# 2 x 15 2 x 10 (side-lying with pad between knees) - -   Hip add ball squeezes  2 x 15 2 x 15 1  x20 2 x 10 2 x 20 2 x 20   LTR   2 x 10 2 x 10 2 x15 - 2 x 20 with B LEs on swiss ball 2 x 20 with B LEs on swiss ball   SLR  2 x 10 ea 2 x 10 ea 2 x 10 ea - 2 x 15 ea 2 x 15 ea   Heel slides  2 x 10 ea 2 x 10 ea - - - -   Hip abduction   Hook-lying, blue theraband 2 x 15 Hook-lying, blue theraband 2 x 15 Hook-lying blue theraband 2 x 20 - - -   Clamshells    Blue x 20 ea 2 x 10 ea 2 x 15 each, blue 2 x 15 ea blue   Scapular retractions    Green 2 x 10  Red 2 x 15 Red 2 x 15 with elbows flexed and with elbows extended   Hamstring curls     Side-lying, green band 2 x 10 ea - -   Posterior pelvic tilts      2 x 10 -      HEP: No changes made to HEP. Treatment/Session Assessment:    · Response to Treatment:  Pt arrived to PT with increased pain today. Patient educated on the importance of keeping loads close to her body when moving objects and to move feet when holding objects, as opposed to twisting at her back. Pt verbalized understanding. Patient exhibited difficulty with posterior pelvic tilts at previous treatment but performs them well with hip adductor ball squeezes and glute sets. · Compliance with Program/Exercises: Compliant with attendance. Remains unwilling to receive manual therapy to lower back and hesitant to attempt exercises involving her lower back. · Recommendations/Intent for next treatment session:  \"Next visit will focus on continuing to progress exercises as pt tolerates\".   Total Treatment Duration: 28 minutes  PT Patient Time In/Time Out  Time In: 9859  Time Out: [address] [facility], PT

## 2017-08-15 ENCOUNTER — HOSPITAL ENCOUNTER (OUTPATIENT)
Dept: PHYSICAL THERAPY | Age: 42
Discharge: HOME | End: 2017-08-15
Payer: COMMERCIAL

## 2017-08-15 NOTE — PROGRESS NOTES
Arslan Farias  : 1975 [facility] at [facility] KRANTHI PLASCENCIA  [address] [facility], [address] [place] [address],[address], [facility] [address]  [phone]   [phone]         OUTPATIENT PHYSICAL Amado Vidal  66. 8/15/2017    ICD-10: Treatment Diagnosis: Low back pain (M54.5), Spinal stenosis, lumbar region (M48.06), Other intervertebral disc degeneration, lumbosacral region (M51.37)  Precautions/Allergies:   Tramadol   Fall Risk Score: 0 (? 5 = High Risk)  MD Orders: Evaluate and treat, modalities, core strengthening and stretching (17) MEDICAL/REFERRING DIAGNOSIS:  Spinal stenosis of lumbar region with neurogenic claudication, degeneration of lumbosacral intervertebral disc   DATE OF ONSET: \"Years ago\"  REFERRING PHYSICIAN: Silke Tipton MD  RETURN PHYSICIAN APPOINTMENT: TBD; pt unsure     ASSESSMENT:  Ms. Joretta Dawson called to cancel her therapy appointment this PM secondary to illness. Patient will resume PT at next scheduled appointment pending symptoms. PROBLEM LIST (Impacting functional limitations):  1. Decreased Strength  2. Increased Pain  3. Decreased Activity Tolerance  4. Decreased Flexibility/Joint Mobility INTERVENTIONS PLANNED:  1. Heat  2. Home Exercise Program (HEP)  3. Manual Therapy  4. Range of Motion (ROM)  5. Therapeutic Exercise/Strengthening   TREATMENT PLAN:  Effective Dates: 17 TO 17. Frequency/Duration: 2-3 visits per week for 6 weeks\"}  GOALS: (Goals have been discussed and agreed upon with patient.)  Short-Term Functional Goals: Time Frame: 3 weeks  1. Pt will report 3-4 out of 10 pain in legs and lower back and improve score on Oswestry Disability Index to 30% to indicate improved subjective report of function. 2. Patient will report being able to sleep without interference for >3 nights per week from low back and/or leg symptoms. 3. Patient will tolerate 30 minutes of physical therapy treatment without exacerbation of low back and leg symptoms.   4. Patient will be independent and compliant with home exercise program.  Discharge Goals: Time Frame: 6 weeks  1. Patient will report no greater than 4 out of 10 low back and leg pain at worst  2. Patient will be able to ambulate on treadmill for greater than 30 minutes without having to cease activity due to low back and/or leg pain. 3. Patient will report being able to walk around mall without increased lower back pain. 4. Patient will be improve score on Oswestry Disability index to 20% to indicate improved function. 5. Pt will be independent with advanced HEP for trunk stabilization.

## 2017-08-17 ENCOUNTER — HOSPITAL ENCOUNTER (OUTPATIENT)
Dept: PHYSICAL THERAPY | Age: 42
Discharge: HOME | End: 2017-08-17
Payer: COMMERCIAL

## 2017-08-17 PROCEDURE — 97140 MANUAL THERAPY 1/> REGIONS: CPT

## 2017-08-17 PROCEDURE — 97110 THERAPEUTIC EXERCISES: CPT

## 2017-08-17 NOTE — PROGRESS NOTES
Francesca Arana  : 1975 [facility] KRANTHI PLASCENCIA  [address] [place] [address], [facility], [address] [place] [address]  [phone]   [phone]         OUTPATIENT PHYSICAL THERAPY:Daily Note 2017    ICD-10: Treatment Diagnosis: Low back pain (M54.5), Spinal stenosis, lumbar region (M48.06), Other intervertebral disc degeneration, lumbosacral region (M51.37)  Precautions/Allergies:   Tramadol   Fall Risk Score: 0 (? 5 = High Risk)  MD Orders: Evaluate and treat, modalities, core strengthening and stretching (17) MEDICAL/REFERRING DIAGNOSIS:  Spinal stenosis of lumbar region with neurogenic claudication, degeneration of lumbosacral intervertebral disc   DATE OF ONSET: \"Years ago\"  REFERRING PHYSICIAN: Danette Tee MD  RETURN PHYSICIAN APPOINTMENT: TBD; pt unsure     INITIAL ASSESSMENT:  Ms. Earl Hough presents to PT with complaints of lower back pain which affects both legs which is worse on the L today. Patient demonstrates apprehension to perform movements involving lower back, increased pain and radicular pain into L LE. Patient's impairments limit her ability to perform work tasks, limit her ability to ambulate for long distances and generally reduce her activity participation overall. Patient will benefit from skilled PT intervention to reduce her discomfort and maximize her physical capabilities to improve function. PROBLEM LIST (Impacting functional limitations):  1. Decreased Strength  2. Increased Pain  3. Decreased Activity Tolerance  4. Decreased Flexibility/Joint Mobility INTERVENTIONS PLANNED:  1. Heat  2. Home Exercise Program (HEP)  3. Manual Therapy  4. Range of Motion (ROM)  5. Therapeutic Exercise/Strengthening   TREATMENT PLAN:  Effective Dates: 17 TO 17. Frequency/Duration: 2-3 visits per week for 6 weeks\"}  GOALS: (Goals have been discussed and agreed upon with patient.)  Short-Term Functional Goals: Time Frame: 3 weeks  1.  Pt will report 3-4 out of 10 pain in legs and lower back and improve score on Oswestry Disability Index to 30% to indicate improved subjective report of function. 2. Patient will report being able to sleep without interference for >3 nights per week from low back and/or leg symptoms. 3. Patient will tolerate 30 minutes of physical therapy treatment without exacerbation of low back and leg symptoms. 4. Patient will be independent and compliant with home exercise program.  Discharge Goals: Time Frame: 6 weeks  1. Patient will report no greater than 4 out of 10 low back and leg pain at worst  2. Patient will be able to ambulate on treadmill for greater than 30 minutes without having to cease activity due to low back and/or leg pain. 3. Patient will report being able to walk around mall without increased lower back pain. 4. Patient will be improve score on Oswestry Disability index to 20% to indicate improved function. 5. Pt will be independent with advanced HEP for trunk stabilization. Rehabilitation Potential For Stated Goals: Fair, guarded                The information in this section was collected on 7-18-17 (except where otherwise noted). HISTORY:   History of Present Injury/Illness (Reason for Referral):  Pt reports that she has had lower back pain for 'years'. She has had physical therapy in the past and multiple injections which were all unsuccessful at resolving symptoms. Patient states that she has pain that radiates into both legs to the ankle and incapacitates her to the point that she has to miss work (approximately 3-4 times per month). States that she is unable to walk on treadmill for >10 minutes without having to quit due to back pain. Reports that when her symptoms are aggravated she stays in bed in fetal position with her son taking care of her. Past Medical History/Comorbidities:   Ms. Molly Guerrero  has a past medical history of Acid reflux; Anemia; Anxiety; Arthralgia of multiple sites;  Asthma; B[facility] 1 [facility] ([facility] Chronic pain; Depressive disorder; Genital herpes; MRSA (methicillin resistant staph aureus) culture positive; and Spinal stenosis. She also has no past medical history of Adverse effect of anesthesia; Difficult intubation; Malignant hyperthermia due to anesthesia; Nausea & vomiting; or Pseudocholinesterase deficiency. Ms. Romulo Nguyen  has a past surgical history that includes tubal ligation. Social History/Living Environment:     Patient living with friend in apartment with four steps to enter. Patient's son also lives with her. 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 Current Medications:       Current Outpatient Prescriptions:     QUEtiapine (SEROQUEL) 50 mg tablet, Take 1 Tab by mouth two (2) times a day for 30 days. , Disp: 60 Tab, Rfl: 5    DULoxetine (CYMBALTA) 60 mg capsule, Take 1 Cap by mouth daily for 30 days. , Disp: 30 Cap, Rfl: 5    clonazePAM (KLONOPIN) 1 mg tablet, Take 1 Tab by mouth two (2) times a day for 60 days. Max Daily Amount: 2 mg., Disp: 60 Tab, Rfl: 1    meloxicam (MOBIC) 7.5 mg tablet, Take 1 Tab by mouth two (2) times daily (with meals). , Disp: 60 Tab, Rfl: 2    gabapentin (NEURONTIN) 600 mg tablet, Take 0.5 Tabs by mouth three (3) times daily. , Disp: 90 Tab, Rfl: 2    montelukast (SINGULAIR) 10 mg tablet, Take 1 Tab by mouth daily. , Disp: 30 Tab, Rfl: 2    SUMAtriptan (IMITREX) 50 mg tablet, Take 1 Tab by mouth once as needed for Migraine (may repeat after 2 hours if needed, max 2 tablets in 24 hours). , Disp: 9 Tab, Rfl: 2    furosemide (LASIX) 20 mg tablet, Take 1 Tab by mouth daily as needed. , Disp: 30 Tab, Rfl: 1    ADVAIR DISKUS 250-50 mcg/dose diskus inhaler, Take 1 Puff by inhalation two (2) times a day., Disp: 1 Inhaler, Rfl: 2    promethazine (PHENERGAN) 25 mg tablet, Take 1 Tab by mouth every six (6) hours as needed for Nausea (associated with migraines). , Disp: 30 Tab, Rfl: 0    raNITIdine (ZANTAC) 300 mg tablet, Take 1 Tab by mouth daily. , Disp: 30 Tab, Rfl: 2    aspirin (ASPIRIN) 325 mg tablet, Take 325 mg by mouth., Disp: , Rfl:     nitroglycerin (NITROSTAT) 0.4 mg SL tablet, 0.4 mg by SubLINGual route., Disp: , Rfl:     albuterol (PROVENTIL HFA, VENTOLIN HFA, PROAIR HFA) 90 mcg/actuation inhaler, Take 2 Puffs by inhalation. , Disp: , Rfl:     acyclovir (ZOVIRAX) 400 mg tablet, TAKE 1 TABLET BY MOUTH TWICE A DAY, Disp: , Rfl: 12    cyclobenzaprine (FLEXERIL) 10 mg tablet, Take  by mouth three (3) times daily as needed for Muscle Spasm(s). , Disp: , Rfl:    Date Last Reviewed:  7-1817   Number of Personal Factors/Comorbidities that affect the Plan of Care: 1-2: MODERATE COMPLEXITY   EXAMINATION:   Observation/Orthostatic Postural Assessment:          Patient leans to the R during subjective portion of evaluation. Ambulates slowly with increased upright trunk posture during gait. Patient is very hesitant to perform ROM measures for lower back. Palpation:          Apprehension with palpation of lumbar paraspinals. Muscles felt tight and guarded but palpation was limited to mild, light touch due to patient apprehension. ROM:          Patient declined to perform lumbar flexion ROM stating that she did not want to 'aggravate' anything. Lumbar extension limited to 25% or normal ROM but did not reproduce pain. Strength:          R LE: hip flexion, IR/ER and abduction 5/5, knee flexion/ext 5/5, ankle DF/PF 5/5        L LE: hip flexion, IR/ER and abduction 5/5, knee flex/ext 5/5, ankle DF/PF 5/5  Special Tests:          Slump test negative bilaterally, inconclusive straight leg raise bilaterally secondary to apprehension with movement  Neurological Screen:  Normal sensation to light touch bilaterally, reflexes normal at S2 bilaterally, mildly reduced L3-4 on L, normal on R       Body Structures Involved:  1. Nerves  2. Bones  3. Joints  4.  Muscles Body Functions Affected:  1. Sensory/Pain  2. Neuromusculoskeletal  3. Movement Related Activities and Participation Affected:  1. Mobility  2. Self Care  3. Domestic Life  4. Community, Social and [place]   Number of elements (examined above) that affect the Plan of Care: 4+: HIGH COMPLEXITY   CLINICAL PRESENTATION:   Presentation: Stable and uncomplicated: LOW COMPLEXITY   CLINICAL DECISION MAKING:   Outcome Measure: Tool Used: Modified Oswestry Low Back Pain Questionnaire  Score:  Initial: 24/50  Most Recent: 24/50 (Date: 7-19-17 )   Interpretation of Score: Each section is scored on a 0-5 scale, 5 representing the greatest disability. The scores of each section are added together for a total score of 50. Medical Necessity:   · Skilled intervention continues to be required due to heightened fear of low back movement, increased pain that radiates into B LEs, decreased activity participation secondary to low back pain. .  Reason for Services/Other Comments:  · Patient is hopeful to have surgery and expressed doubts regarding effectiveness of physical therapy. .   Use of outcome tool(s) and clinical judgement create a POC that gives a: Questionable prediction of patient's progress: MODERATE COMPLEXITY            TREATMENT:   (In addition to Assessment/Re-Assessment sessions the following treatments were rendered)  Pre-treatment Symptoms/Complaints: Pt stated that the pain in her L LE has been bad all day today and that her lower back is 'tight'. Pain: Initial:     8 out of 10 Post Session: 8-10     Manual therapy (9 minutes): Gentle soft tissue mobilizations with patient in R side-lying to lumbar parspinals for reduced muscle tension, L LE sciatic nerve glides for reduced radicular symptoms    Therapeutic exercise (22 minutes): Exercises performed per grid below. Patient required verbal cues and demonstration to perform standing therapeutic exercises with theraband for improved posture.      Date:  7-20-17 Date:  7-25-17 Date:  7-26-17 Date  8-1-17 Date  8-2-17 Date  8-9-17 Date  8-10-17 Date  8-17-17   Activity/Exercise Parameters Parameters Parameters        Glute sets 2 x 10ea 2x 15 2 x 15 2 x 15 2 x 15 2 x 20 2 x 20 X 20   Lumbar ball rolls 2 x 10ea 2 x 10 2 x 10 1 x 20 - 2 x 20 2 x 20 2  X 20   Standing hip abduction 2 x 10  ea - - - - -     Calf raises 2 x 10 ea - - - - -     Slantboard calf stretches 3 x 20\" each  - - - - -     SAQ  2 x 10 ea 2 x 15 ea 3# 2 x 15 - 3# 2 x 15 4# 2 x 15 5# 2 x 15    LAQ  3# 2 x 10 ea  3# 2 x 15 2 x 10 (side-lying with pad between knees) - -    Hip add ball squeezes  2 x 15 2 x 15 1  x20 2 x 10 2 x 20 2 x 20 2 x 20   LTR   2 x 10 2 x 10 2 x15 - 2 x 20 with B LEs on swiss ball 2 x 20 with B LEs on swiss ball 2 x 20    SLR  2 x 10 ea 2 x 10 ea 2 x 10 ea - 2 x 15 ea 2 x 15 ea 2 x 15 ea   Heel slides  2 x 10 ea 2 x 10 ea - - - -    Hip abduction   Hook-lying, blue theraband 2 x 15 Hook-lying, blue theraband 2 x 15 Hook-lying blue theraband 2 x 20 - - -    Clamshells    Blue x 20 ea 2 x 10 ea 2 x 15 each, blue 2 x 15 ea blue    Scapular retractions    Green 2 x 10  Red 2 x 15 Red 2 x 15 with elbows flexed and with elbows extended Red 2 x 15 with L foot on step to reduce L LE discomfort   Hamstring curls     Side-lying, green band 2 x 10 ea - -    Posterior pelvic tilts      2 x 10 -    Seated pelvic movement        Posterior tilt x 15, unilateral hip hikes x 15, seated marches x 10 each leg                            HEP: No changes made to HEP. Treatment/Session Assessment:    · Response to Treatment:  Pt had increased pain upon arrival today and increased L LE pain. Patient demonstrated increased emotional lability today with episodes of laughing with increased pain in L LE. Patient remains hesitant to receive manual interventions but was more willing today. Patient's progress limited today by increased low back and L LE pain.   · Compliance with Program/Exercises: Compliant today  · Recommendations/Intent for next treatment session: \"Next visit will focus on continuing to progress exercises as pt tolerates\".   Total Treatment Duration: 31 minutes  PT Patient Time In/Time Out  Time In: 1600  Time Out: [address]

## 2017-08-22 ENCOUNTER — HOSPITAL ENCOUNTER (OUTPATIENT)
Dept: PHYSICAL THERAPY | Age: 42
Discharge: HOME | End: 2017-08-22
Payer: COMMERCIAL

## 2017-08-22 PROCEDURE — 97110 THERAPEUTIC EXERCISES: CPT

## 2017-08-22 NOTE — PROGRESS NOTES
More Quinn  : 1975 [facility] KRANTHI PLASCENCIA  [address], [address], [address] [facility]  [phone]   [phone]         OUTPATIENT PHYSICAL THERAPY:Daily Note and Progress Report 2017    ICD-10: Treatment Diagnosis: Low back pain (M54.5), Spinal stenosis, lumbar region (M48.06), Other intervertebral disc degeneration, lumbosacral region (M51.37)  Precautions/Allergies:   Tramadol   Fall Risk Score: 0 (? 5 = High Risk)  MD Orders: Evaluate and treat, modalities, core strengthening and stretching (17) MEDICAL/REFERRING DIAGNOSIS:  Spinal stenosis of lumbar region with neurogenic claudication, degeneration of lumbosacral intervertebral disc   DATE OF ONSET: \"Years ago\"  REFERRING PHYSICIAN: Adebayo Eng MD  [address]: 17 ASSESSMENT:  Ms. Jacki Mohs has attended 10 visits since her initial evaluation. Patient reports no improvement with physical therapy. Patient continues to report radicular symptoms into L LE and reports no improvement in symptoms since beginning PT. Patient's treatment has been limited by patient's hesitation with manual therapy interventions, inconsistent performance of home exercise program, and hesitation/limitation with performance of therapeutic exercises while in therapy. Patient has not made objective or subjective progress, and will return to MD for follow-up 17. Patient has not benefitted from physical therapy intervention at this point, and while patient may possibly benefit from PT with improved compliance to all interventions, patient advised to put therapy on hold until follow-up with MD on 17 secondary to lack of improvement. PROBLEM LIST (Impacting functional limitations):  1. Decreased Strength  2. Increased Pain  3. Decreased Activity Tolerance  4. Decreased Flexibility/Joint Mobility INTERVENTIONS PLANNED:  1. Heat  2. Home Exercise Program (HEP)  3.  Manual Therapy  4. Range of Motion (ROM)  5. Therapeutic Exercise/Strengthening   TREATMENT PLAN:  Effective Dates: 7-18-17 TO 8-29-17. Frequency/Duration: 2-3 visits per week for 6 weeks  GOALS: (Goals have been discussed and agreed upon with patient.)  Short-Term Functional Goals: Time Frame: 3 weeks  1. Pt will report 3-4 out of 10 pain in legs and lower back and improve score on Oswestry Disability Index to 30% to indicate improved subjective report of function. Not met 8-22-17  2. Patient will report being able to sleep without interference for >3 nights per week from low back and/or leg symptoms. Not met 8-22-17  3. Patient will tolerate 30 minutes of physical therapy treatment without exacerbation of low back and leg symptoms. Not met 8-22-17  4. Patient will be independent and compliant with home exercise program. Not met 8-22-17  Discharge Goals: Time Frame: 6 weeks  1. Patient will report no greater than 4 out of 10 low back and leg pain at worst Not met 8-22-17  2. Patient will be able to ambulate on treadmill for greater than 30 minutes without having to cease activity due to low back and/or leg pain. Not met 8-22-17  3. Patient will report being able to walk around mall without increased lower back pain. Not met 8-22-17  4. Patient will be improve score on Oswestry Disability index to 20% to indicate improved function. Not met 8-22-17  5. Pt will be independent with advanced HEP for trunk stabilization. Not met 8-22-17    Sara Pierson, PT  8/22/2017                  The information in this section was collected on 7-18-17 (except where otherwise noted). HISTORY:   History of Present Injury/Illness (Reason for Referral):  Pt reports that she has had lower back pain for 'years'. She has had physical therapy in the past and multiple injections which were all unsuccessful at resolving symptoms.  Patient states that she has pain that radiates into both legs to the ankle and incapacitates her to the point that she has to miss work (approximately 3-4 times per month). States that she is unable to walk on treadmill for >10 minutes without having to quit due to back pain. Reports that when her symptoms are aggravated she stays in bed in fetal position with her son taking care of her. Past Medical History/Comorbidities:   Ms. Amanda Staley  has a past medical history of Acid reflux; Anemia; Anxiety; Arthralgia of multiple sites; Asthma; Bipolar 1 disorder ([address] Chronic pain; Depressive disorder; Genital herpes; MRSA (methicillin resistant staph aureus) culture positive; and Spinal stenosis. She also has no past medical history of Adverse effect of anesthesia; Difficult intubation; Malignant hyperthermia due to anesthesia; Nausea & vomiting; or Pseudocholinesterase deficiency. Ms. Amanda Staley  has a past surgical history that includes tubal ligation. Social History/Living Environment:     Patient living with friend in apartment with four steps to enter. Patient's son also lives with her. 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 Current Medications:       Current Outpatient Prescriptions:     QUEtiapine (SEROQUEL) 50 mg tablet, Take 1 Tab by mouth two (2) times a day for 30 days. , Disp: 60 Tab, Rfl: 5    DULoxetine (CYMBALTA) 60 mg capsule, Take 1 Cap by mouth daily for 30 days. , Disp: 30 Cap, Rfl: 5    clonazePAM (KLONOPIN) 1 mg tablet, Take 1 Tab by mouth two (2) times a day for 60 days. Max Daily Amount: 2 mg., Disp: 60 Tab, Rfl: 1    meloxicam (MOBIC) 7.5 mg tablet, Take 1 Tab by mouth two (2) times daily (with meals). , Disp: 60 Tab, Rfl: 2    gabapentin (NEURONTIN) 600 mg tablet, Take 0.5 Tabs by mouth three (3) times daily. , Disp: 90 Tab, Rfl: 2    montelukast (SINGULAIR) 10 mg tablet, Take 1 Tab by mouth daily. , Disp: 30 Tab, Rfl: 2    SUMAtriptan (IMITREX) 50 mg tablet, Take 1 Tab by mouth once as needed for Migraine (may repeat after 2 hours if needed, max 2 tablets in 24 hours). , Disp: 9 Tab, Rfl: 2    furosemide (LASIX) 20 mg tablet, Take 1 Tab by mouth daily as needed. , Disp: 30 Tab, Rfl: 1    ADVAIR DISKUS 250-50 mcg/dose diskus inhaler, Take 1 Puff by inhalation two (2) times a day., Disp: 1 Inhaler, Rfl: 2    promethazine (PHENERGAN) 25 mg tablet, Take 1 Tab by mouth every six (6) hours as needed for Nausea (associated with migraines). , Disp: 30 Tab, Rfl: 0    raNITIdine (ZANTAC) 300 mg tablet, Take 1 Tab by mouth daily. , Disp: 30 Tab, Rfl: 2    aspirin (ASPIRIN) 325 mg tablet, Take 325 mg by mouth., Disp: , Rfl:     nitroglycerin (NITROSTAT) 0.4 mg SL tablet, 0.4 mg by SubLINGual route., Disp: , Rfl:     albuterol (PROVENTIL HFA, VENTOLIN HFA, PROAIR HFA) 90 mcg/actuation inhaler, Take 2 Puffs by inhalation. , Disp: , Rfl:     acyclovir (ZOVIRAX) 400 mg tablet, TAKE 1 TABLET BY MOUTH TWICE A DAY, Disp: , Rfl: 12    cyclobenzaprine (FLEXERIL) 10 mg tablet, Take  by mouth three (3) times daily as needed for Muscle Spasm(s). , Disp: , Rfl:    Date Last Reviewed:  8-22-17   Number of Personal Factors/Comorbidities that affect the Plan of Care: 1-2: MODERATE COMPLEXITY   EXAMINATION:   8/22/2017    Observation/Orthostatic Postural Assessment:          Patient ambulates with reduced stance time on L LE. Patient ascends/descends stairs with reciprocal gait pattern with one hand on stair rail for support. .  Palpation:          Patient remains apprehensive with palpation and requested for PT to not perform manual interventions    Special Tests:          SI joint gapping/compression results inconclusive, patient reports that it feels \"the same\" with performance of each. No pelvic obliquities noted with palpation of B ASIS and no change in leg length with supine<>sit test for SI joint. Negative WALLY bilaterally. Body Structures Involved:  1. Nerves  2. Bones  3. Joints  4.  Muscles Body Functions Affected:  1. Sensory/Pain  2. Neuromusculoskeletal  3. Movement Related Activities and Participation Affected:  1. Mobility  2. Self Care  3. Domestic Life  4. Community, Social and [place]   Number of elements (examined above) that affect the Plan of Care: 4+: HIGH COMPLEXITY   CLINICAL PRESENTATION:   Presentation: Stable and uncomplicated: LOW COMPLEXITY   CLINICAL DECISION MAKING:   Outcome Measure: Tool Used: Modified Oswestry Low Back Pain Questionnaire  Score:  Initial: 24/50 24/50 (Date: 7-19-17 ) Most Recent: 27/50 (8-22-17)   Interpretation of Score: Each section is scored on a 0-5 scale, 5 representing the greatest disability. The scores of each section are added together for a total score of 50. Medical Necessity:   · Skilled intervention continues to be required due to heightened fear of low back movement, increased pain that radiates into B LEs, decreased activity participation secondary to low back pain. .  Reason for Services/Other Comments:  · Patient is hopeful to have surgery and expressed doubts regarding effectiveness of physical therapy. .   Use of outcome tool(s) and clinical judgement create a POC that gives a: Questionable prediction of patient's progress: MODERATE COMPLEXITY            TREATMENT:   (In addition to Assessment/Re-Assessment sessions the following treatments were rendered)  Pre-treatment Symptoms/Complaints: Pt states that she has been having L LE pain down into her L LE all day which has persisted for many days. Patient stated that she was given 10-12 visits and that she is ok with today being her last day. Stated that PT isn't working. Pain: Initial:     8 out of 10 in L LE Post Session: 8-10     Manual therapy (0 minutes): Pt refused to receive manual treatment interventions. Therapeutic exercise (22 minutes): Exercises performed per grid below.  Patient required verbal cues and demonstration to perform standing therapeutic exercises with theraband for improved posture. Date:  7-20-17 Date:  7-25-17 Date:  7-26-17 Date  8-1-17 Date  8-2-17 Date  8-9-17 Date  8-10-17 Date  8-17-17 DATE  8-22-17   Activity/Exercise Parameters Parameters Parameters         Glute sets 2 x 10ea 2x 15 2 x 15 2 x 15 2 x 15 2 x 20 2 x 20 X 20 X 20   Lumbar ball rolls 2 x 10ea 2 x 10 2 x 10 1 x 20 - 2 x 20 2 x 20 2  X 20 X 20    Standing hip abduction 2 x 10  ea - - - - -      Calf raises 2 x 10 ea - - - - -      Slantboard calf stretches 3 x 20\" each  - - - - -      SAQ  2 x 10 ea 2 x 15 ea 3# 2 x 15 - 3# 2 x 15 4# 2 x 15 5# 2 x 15  5# 2 X 15   LAQ  3# 2 x 10 ea  3# 2 x 15 2 x 10 (side-lying with pad between knees) - -     Hip add ball squeezes  2 x 15 2 x 15 1  x20 2 x 10 2 x 20 2 x 20 2 x 20 X 20   LTR   2 x 10 2 x 10 2 x15 - 2 x 20 with B LEs on swiss ball 2 x 20 with B LEs on swiss ball 2 x 20  X 20   SLR  2 x 10 ea 2 x 10 ea 2 x 10 ea - 2 x 15 ea 2 x 15 ea 2 x 15 ea    Heel slides  2 x 10 ea 2 x 10 ea - - - -     Hip abduction   Hook-lying, blue theraband 2 x 15 Hook-lying, blue theraband 2 x 15 Hook-lying blue theraband 2 x 20 - - -     Clamshells    Blue x 20 ea 2 x 10 ea 2 x 15 each, blue 2 x 15 ea blue  2 x 15 ea, blue   Scapular retractions    Green 2 x 10  Red 2 x 15 Red 2 x 15 with elbows flexed and with elbows extended Red 2 x 15 with L foot on step to reduce L LE discomfort Green 2 x 15   Hamstring curls     Side-lying, green band 2 x 10 ea - -     Posterior pelvic tilts      2 x 10 -     Seated pelvic movement        Posterior tilt x 15, unilateral hip hikes x 15, seated marches x 10 each leg                               HEP: No changes made to HEP. Treatment/Session Assessment:    · Response to Treatment:  Pt making no real progress with PT. Patient continues to report ongoing L LE pain and ambulate with an antalgic gait pattern. Patient also continues to decline manual therapy and demonstrate inconsistent performance with therapeutic exercises.  Patient demonstrates adequate core activation with performance of glute sets and hip ADD ball squeezes, with posterior pelivic tilt evident, but patient unable to adequately perform posterior pelvic tilt as isolated exercises. Patient findings from SI joint special testing inconclusive but does not appear to be contributing to symptoms. · Compliance with Program/Exercises: Compliant with attendance. Compliance with HEP inconsistent, and patient does not tolerate manual interventions. · Recommendations/Intent for next treatment session: Patient's rehabilitation is on hold at this point pending MD recommendation at follow-up appointment on September 6th. Patient has cancelled further scheduled appointments per PT recommendation to put therapy on hold.   Total Treatment Duration: 34 minutes  PT Patient Time In/Time Out  Time In: 1511  Time Out: [address], PT

## 2017-08-24 ENCOUNTER — APPOINTMENT (OUTPATIENT)
Dept: PHYSICAL THERAPY | Age: 42
End: 2017-08-24
Payer: COMMERCIAL

## 2017-09-25 PROBLEM — J45.909 ASTHMA DEPENDENT ON INHALED STEROIDS: Status: ACTIVE | Noted: 2017-03-20

## 2017-09-25 PROBLEM — Z79.51 ASTHMA DEPENDENT ON INHALED STEROIDS: Status: ACTIVE | Noted: 2017-03-20

## 2017-09-25 PROBLEM — R07.1 CHEST PAIN ON BREATHING: Status: ACTIVE | Noted: 2017-03-20

## 2017-09-25 PROBLEM — M48.061 SPINAL STENOSIS OF LUMBAR REGION: Status: ACTIVE | Noted: 2017-03-20

## 2017-09-25 PROBLEM — M51.9 DISORDER OF INTERVERTEBRAL DISC: Status: ACTIVE | Noted: 2017-03-20

## 2017-10-03 NOTE — PROGRESS NOTES
Alexis Jama  : 1975 [facility] Center at [facility] KRANTHI PLASCENCIA  [address] [facility], [address] [place] [address],[address], [place] [address].  [phone]   [phone]       OUTPATIENT PHYSICAL THERAPY:Discontinuation Summary 10/3/2017      ICD-10: Treatment Diagnosis: Pain in left shoulder (M25.512), Cervicalgia (M54.2)  Precautions/Allergies:   Tramadol   Fall Risk Score: 0 (? 5 = High Risk)  MD Orders: Evaluate and treat, HEP, strengthening, ROM, manual therapy (17) MEDICAL/REFERRING DIAGNOSIS:  neck/left shoulder   DATE OF ONSET: 17  REFERRING PHYSICIAN: Waqar Bledsoe MD  RETURN PHYSICIAN APPOINTMENT: TBD     DISCHARGE ASSESSMENT:  Alexis Jama has been seen in physical therapy from 6/15/17 to 17 for 2 visits. Treatment has been discontinued at this time due to Patient noncompliant and Expiration of plan of care without further referral by ordering physician. Patient did not meet any of the listed goals below. Thank you for this referral.              GOALS: (Goals have been discussed and agreed upon with patient.)  Short-Term Functional Goals: (None met)  1. Report no more than 5/10 intermittent pain to L shoulder and neck with compensatory use during basic functional activities, and score less than 40% on the DASH. 2. L shoulder PROM forward elevation greater than 120 degrees and abduction greater than 120  degrees to progress into functional ranges. 3. Improve cervical ROM to allow patient to turn and check her blind spot while driving without turning body. 4. Demonstrate good L shoulder isometric strength with manual testing to progress into strength phase. 5. Independent with initial HEP. Discharge Goals: (None met)  1. Patient will report no limitations with work activities secondary to L shoulder or neck pain. 2. No more than 3/10 intermittent pain L shoulder with return to normalized household and work activities, and score less than 20% on the DASH.   3. L shoulder AROM forward elevation greater than 150 degrees and abduction greater than 150 degrees, and strength to shoulder are grossly WNL's for safe use with normalized activities. 4. Demonstrate good functional shoulder strength and endurance for return to normalized household and work activities. 5. Independent with advanced shoulder HEP for continued self-management.   Rehabilitation Potential For Stated Goals: Marija January, PT  10/3/2017

## 2017-10-10 NOTE — PROGRESS NOTES
[place]  : 1975 Therapy Center at [facility] KRANTHI PLASCENCIA  [address] [facility], [address] [place] [address],[address], [place] [address].  [phone]   [phone]         OUTPATIENT PHYSICAL THERAPY:Discontinuation Summary 10/10/2017    ICD-10: Treatment Diagnosis: Low back pain (M54.5), Spinal stenosis, lumbar region (M48.06), Other intervertebral disc degeneration, lumbosacral region (M51.37)  Precautions/Allergies:   Tramadol   Fall Risk Score: 0 (? 5 = High Risk)  MD Orders: Evaluate and treat, modalities, core strengthening and stretching (17) MEDICAL/REFERRING DIAGNOSIS:  Spinal stenosis of lumbar region with neurogenic claudication, degeneration of lumbosacral intervertebral disc   DATE OF ONSET: \"Years ago\"  REFERRING PHYSICIAN: Steff Moore MD      DISCONTINUATION ASSESSMENT:  Ms. Indigo Tinoco has attended 10 visits since her initial evaluation. Patient reports no improvement with physical therapy. Patient continues to report radicular symptoms into L LE and reports no improvement in symptoms since beginning PT. Patient's treatment has been limited by patient's hesitation with manual therapy interventions, inconsistent performance of home exercise program, and hesitation/limitation with performance of therapeutic exercises while in therapy. Patient has not made objective or subjective progress, and was scheduled to return to MD for follow-up 17 per pt. Patient has not benefitted from physical therapy intervention at this point, and while patient may possibly benefit from PT with improved compliance to all interventions, patient is discontinued at this time following no continued orders or appointments scheduled by patient after follow-up with MD.           GOALS: (Goals have been discussed and agreed upon with patient.)  Short-Term Functional Goals: Time Frame: 3 weeks  1.  Pt will report 3-4 out of 10 pain in legs and lower back and improve score on Oswestry Disability Index to 30% to indicate improved subjective report of function. Not met 8-22-17  2. Patient will report being able to sleep without interference for >3 nights per week from low back and/or leg symptoms. Not met 8-22-17  3. Patient will tolerate 30 minutes of physical therapy treatment without exacerbation of low back and leg symptoms. Not met 8-22-17  4. Patient will be independent and compliant with home exercise program. Not met 8-22-17  Discharge Goals: Time Frame: 6 weeks  1. Patient will report no greater than 4 out of 10 low back and leg pain at worst Not met 8-22-17  2. Patient will be able to ambulate on treadmill for greater than 30 minutes without having to cease activity due to low back and/or leg pain. Not met 8-22-17  3. Patient will report being able to walk around mall without increased lower back pain. Not met 8-22-17  4. Patient will be improve score on Oswestry Disability index to 20% to indicate improved function. Not met 8-22-17  5. Pt will be independent with advanced HEP for trunk stabilization.  Not met 8-22-17    Sara Pierson, PT  10/10/2017                  ;

## 2017-10-23 ENCOUNTER — HOSPITAL ENCOUNTER (OUTPATIENT)
Dept: SURGERY | Age: 42
Discharge: HOME | End: 2017-10-23
Payer: COMMERCIAL

## 2017-10-23 VITALS
SYSTOLIC BLOOD PRESSURE: 116 MMHG | RESPIRATION RATE: 16 BRPM | TEMPERATURE: 98.1 F | BODY MASS INDEX: 26.14 KG/M2 | HEART RATE: 82 BPM | DIASTOLIC BLOOD PRESSURE: 59 MMHG | WEIGHT: 166.56 LBS | OXYGEN SATURATION: 100 % | HEIGHT: 67 IN

## 2017-10-23 LAB
BACTERIA SPEC CULT: NORMAL
CREAT SERPL-MCNC: 0.63 MG/DL (ref 0.6–1)
HGB BLD-MCNC: 12.4 G/DL (ref 11.7–15.4)
POTASSIUM SERPL-SCNC: 4 MMOL/L (ref 3.5–5.1)
SERVICE CMNT-IMP: NORMAL

## 2017-10-23 PROCEDURE — 87641 MR-STAPH DNA AMP PROBE: CPT | Performed by: ORTHOPAEDIC SURGERY

## 2017-10-23 PROCEDURE — 85018 HEMOGLOBIN: CPT | Performed by: ORTHOPAEDIC SURGERY

## 2017-10-23 PROCEDURE — 84132 ASSAY OF SERUM POTASSIUM: CPT | Performed by: ORTHOPAEDIC SURGERY

## 2017-10-23 PROCEDURE — 77030027138 HC INCENT SPIROMETER -A

## 2017-10-23 PROCEDURE — 82565 ASSAY OF CREATININE: CPT | Performed by: ORTHOPAEDIC SURGERY

## 2017-10-23 RX ORDER — QUETIAPINE FUMARATE 100 MG/1
100 TABLET, FILM COATED ORAL 3 TIMES DAILY
COMMUNITY
End: 2017-12-13 | Stop reason: SDUPTHER

## 2017-10-23 RX ORDER — CLONAZEPAM 1 MG/1
1 TABLET ORAL
COMMUNITY
End: 2017-12-13 | Stop reason: SDUPTHER

## 2017-10-23 RX ORDER — BUDESONIDE AND FORMOTEROL FUMARATE DIHYDRATE 160; 4.5 UG/1; UG/1
2 AEROSOL RESPIRATORY (INHALATION)
COMMUNITY
End: 2019-05-22

## 2017-10-23 NOTE — PERIOP NOTES
Patient verified name, , and surgery as listed in [facility]. Patient provided medical/health information and PTA medications to the best of their ability. TYPE  CASE: ll  Orders per surgeon: were received  Labs per surgeon: potassium,creat hgb all collected and results are in [facility]  Labs per anesthesia protocol: no additional.   EKG:  Copy of 2017 EKG requested from [facility]. Patient denies any chest pain or shortness of breath on exertion     Nasal Swab collected per MD order and instructions for Mupirocin nasal ointment if required. Patient provided with and instructed on education handouts including Guide to Surgery, blood transfusions, pain management, and hand hygiene for the family and community, and [facility] brochure. Road to Recovery Spine surgery patient guide given. Instructed on incentive spirometry with return demonstration. Long handled prehab sponge given with instructions for use. Patient viewed spine prehab video. Hibiclens and instructions given per hospital policy. Instructed patient to continue previous medications as prescribed prior to surgery unless otherwise directed and to take the following medications the day of surgery according to anesthesia guidelines : Acyclovir, advair inhaler, albuterol inhaler take and bring day of surgery, Symbicort inhaler, gabapentin, Singulair, Zofran or phenergan if needed, Seroquel, Imitrex if needed and Tylenol if needed     Instructed patient to hold  the following medications on the day of surgery: all vitamins,supplements and nsaids 7 days prior to surgery, all nsaids 5 days prior to surgery    Bring incentive spirometer day of surgery    Original medication prescription bottles were not visualized during patient appointment. Patient teach back successful and patient demonstrates knowledge of instruction.

## 2017-10-25 NOTE — H&P
Allergies:NKDA  Medications:Acetaminophen-Codeine #3 (300-30 MG, Take 1 tablet(s) by mouth 2 times a day as needed [PRN]); Acyclovir (400 MG); Alprazolam (1 MG); Clonazepam (1 MG); Fluconazole (150 MG);Meloxicam (7.5 MG, Take 1 tablet(s) by mouth 2 times a day for 30 days); Oxycodone HCl (15 MG); Oxycodone-Acetaminophen ( MG); Prednisone (10 MG (48), USE AS DIRECTED); Quetiapine Fumarate (100 MG); Tizanidine HCl (4 MG); Tramadol HCl (50 MG, Take 1 tablet(s) by mouth 3 times a day as needed [PRN])  Please refer to surgical Pre-assessment for updated medications      CC: LOW BACK AND LEG PAIN    HX:  She is a 43-YO female who has low back and bilateral leg pain and I think she has had surgery in the past. She has a very degenerative L4-5 disc and she has notable stenosis both L4-5 and L5-S1. There is foraminal narrowing. We talked about if she had surgery, would it be just a laminectomy or would she need a fusion given the degenerative disc? If we did a wide laminectomy and did not fuse and had problems afterwards it could make it difficult to do an interbody fusion, but I think at this point less is probably better with her. She has a long [facility] record of being on narcotics, Oxycodone 15 mgs and 10 mgs from different prescribers and I think when I saw her I refused to give her pain meds. I told her she should go back to the people who gave it before. I was not going to take over managing her pain. Her most recent [facility] records dont show she has had anything other than Tramadol since 4/27/17. She says the Tramadol makes her itch or have a rash. Does not seem to help. She needs something and is in severe pain. The most I am willing to do would be Tylenol #3 #30 b.i.d. and also give her a Sterapred DS 12-day dosepak which should help more than the pain meds. PMH, SH, FH, ROS: POA history form has been signed and reviewed. EXAM: She is a healthy appearing female. She has the appearance of being in severe pain.  She is alert and oriented x 3. HEENT: PERRLA. Oropharynx is clear. Neck is without adenopathy or bruits. Lungs are clear to auscultation bilaterally. Heart is RRR without murmur. Abdomen is soft and nontender without any hepatosplenomegaly. We had tried to schedule a surgery and it was denied because they said she had not had conservative treatment. She has done 12 visits of therapy at [facility] and she is still hurting and she is taking Meloxicam.  I think it would be reasonable since she has failed conservative care and she has stenosis to line her up for an L4-5 and L5-S1 laminectomy to address her stenosis. If she does not get better, then consider sending her for a SCS. We discussed the surgery with the patient and she wants to proceed. We went through the procedure, hospital stay, risks including risks of death, infection, paralysis, heart attack, stroke, bleeding, transfusion, clot in leg, clot in lung, dural tear, dural tear, recurrent back problems and the and the fact that I  cannot guarantee pain relief. She understands. I do think she is at risk for not improving with the history of chronic pain and pain medicine use. Hopefully, we can make her better because she does have stenosis.     Electronically Signed By Tatiana ANDRES/dilcia

## 2017-10-29 ENCOUNTER — ANESTHESIA EVENT (OUTPATIENT)
Dept: SURGERY | Age: 42
End: 2017-10-29
Payer: COMMERCIAL

## 2017-10-30 ENCOUNTER — ANESTHESIA (OUTPATIENT)
Dept: SURGERY | Age: 42
End: 2017-10-30
Payer: COMMERCIAL

## 2017-10-30 ENCOUNTER — HOSPITAL ENCOUNTER (OUTPATIENT)
Age: 42
Setting detail: OBSERVATION
Discharge: HOME | End: 2017-11-01
Attending: ORTHOPAEDIC SURGERY | Admitting: ORTHOPAEDIC SURGERY
Payer: COMMERCIAL

## 2017-10-30 ENCOUNTER — APPOINTMENT (OUTPATIENT)
Dept: GENERAL RADIOLOGY | Age: 42
End: 2017-10-30
Attending: ORTHOPAEDIC SURGERY
Payer: COMMERCIAL

## 2017-10-30 PROBLEM — M48.00 SPINAL STENOSIS: Status: ACTIVE | Noted: 2017-10-30

## 2017-10-30 PROCEDURE — 77030025623 HC BUR RND PRECIS STRY -D: Performed by: ORTHOPAEDIC SURGERY

## 2017-10-30 PROCEDURE — 77030018836 HC SOL IRR NACL ICUM -A: Performed by: ORTHOPAEDIC SURGERY

## 2017-10-30 PROCEDURE — 94760 N-INVAS EAR/PLS OXIMETRY 1: CPT

## 2017-10-30 PROCEDURE — 74011000250 HC RX REV CODE- 250: Performed by: ORTHOPAEDIC SURGERY

## 2017-10-30 PROCEDURE — 77030032490 HC SLV COMPR SCD KNE COVD -B: Performed by: ORTHOPAEDIC SURGERY

## 2017-10-30 PROCEDURE — 74011250636 HC RX REV CODE- 250/636: Performed by: ORTHOPAEDIC SURGERY

## 2017-10-30 PROCEDURE — 77030019908 HC STETH ESOPH SIMS -A: Performed by: ANESTHESIOLOGY

## 2017-10-30 PROCEDURE — 76060000035 HC ANESTHESIA 2 TO 2.5 HR: Performed by: ORTHOPAEDIC SURGERY

## 2017-10-30 PROCEDURE — 74011250636 HC RX REV CODE- 250/636

## 2017-10-30 PROCEDURE — 77030031139 HC SUT VCRL2 J&J -A: Performed by: ORTHOPAEDIC SURGERY

## 2017-10-30 PROCEDURE — 77030030163 HC BN WAX J&J -A: Performed by: ORTHOPAEDIC SURGERY

## 2017-10-30 PROCEDURE — 77030003028 HC SUT VCRL J&J -A: Performed by: ORTHOPAEDIC SURGERY

## 2017-10-30 PROCEDURE — 76010000171 HC OR TIME 2 TO 2.5 HR INTENSV-TIER 1: Performed by: ORTHOPAEDIC SURGERY

## 2017-10-30 PROCEDURE — 94640 AIRWAY INHALATION TREATMENT: CPT

## 2017-10-30 PROCEDURE — 74011000250 HC RX REV CODE- 250

## 2017-10-30 PROCEDURE — 74011250636 HC RX REV CODE- 250/636: Performed by: ANESTHESIOLOGY

## 2017-10-30 PROCEDURE — 77030020782 HC GWN BAIR PAWS FLX 3M -B: Performed by: ANESTHESIOLOGY

## 2017-10-30 PROCEDURE — 74011250637 HC RX REV CODE- 250/637: Performed by: ORTHOPAEDIC SURGERY

## 2017-10-30 PROCEDURE — 99218 HC RM OBSERVATION: CPT

## 2017-10-30 PROCEDURE — 77030008477 HC STYL SATN SLP COVD -A: Performed by: ANESTHESIOLOGY

## 2017-10-30 PROCEDURE — 74011000250 HC RX REV CODE- 250: Performed by: ANESTHESIOLOGY

## 2017-10-30 PROCEDURE — 77030011640 HC PAD GRND REM COVD -A: Performed by: ORTHOPAEDIC SURGERY

## 2017-10-30 PROCEDURE — 72020 X-RAY EXAM OF SPINE 1 VIEW: CPT

## 2017-10-30 PROCEDURE — 74011000272 HC RX REV CODE- 272: Performed by: ORTHOPAEDIC SURGERY

## 2017-10-30 PROCEDURE — 77030014007 HC SPNG HEMSTAT J&J -B: Performed by: ORTHOPAEDIC SURGERY

## 2017-10-30 PROCEDURE — 77030008703 HC TU ET UNCUF COVD -A: Performed by: ANESTHESIOLOGY

## 2017-10-30 PROCEDURE — 76210000017 HC OR PH I REC 1.5 TO 2 HR: Performed by: ORTHOPAEDIC SURGERY

## 2017-10-30 PROCEDURE — 77030014650 HC SEAL MTRX FLOSEL BAXT -C: Performed by: ORTHOPAEDIC SURGERY

## 2017-10-30 RX ORDER — CLONAZEPAM 1 MG/1
1 TABLET ORAL
Status: DISCONTINUED | OUTPATIENT
Start: 2017-10-30 | End: 2017-11-01 | Stop reason: HOSPADM

## 2017-10-30 RX ORDER — ROCURONIUM BROMIDE 10 MG/ML
INJECTION, SOLUTION INTRAVENOUS AS NEEDED
Status: DISCONTINUED | OUTPATIENT
Start: 2017-10-30 | End: 2017-10-30 | Stop reason: HOSPADM

## 2017-10-30 RX ORDER — QUETIAPINE FUMARATE 100 MG/1
100 TABLET, FILM COATED ORAL 3 TIMES DAILY
Status: DISCONTINUED | OUTPATIENT
Start: 2017-10-30 | End: 2017-11-01 | Stop reason: HOSPADM

## 2017-10-30 RX ORDER — SODIUM CHLORIDE, SODIUM LACTATE, POTASSIUM CHLORIDE, CALCIUM CHLORIDE 600; 310; 30; 20 MG/100ML; MG/100ML; MG/100ML; MG/100ML
100 INJECTION, SOLUTION INTRAVENOUS CONTINUOUS
Status: DISCONTINUED | OUTPATIENT
Start: 2017-10-30 | End: 2017-10-30 | Stop reason: HOSPADM

## 2017-10-30 RX ORDER — GABAPENTIN 300 MG/1
600 CAPSULE ORAL 2 TIMES DAILY
Status: DISCONTINUED | OUTPATIENT
Start: 2017-10-30 | End: 2017-11-01 | Stop reason: HOSPADM

## 2017-10-30 RX ORDER — FAMOTIDINE 20 MG/1
20 TABLET, FILM COATED ORAL EVERY 12 HOURS
Status: DISCONTINUED | OUTPATIENT
Start: 2017-10-30 | End: 2017-11-01 | Stop reason: HOSPADM

## 2017-10-30 RX ORDER — ONDANSETRON 2 MG/ML
4 INJECTION INTRAMUSCULAR; INTRAVENOUS
Status: DISCONTINUED | OUTPATIENT
Start: 2017-10-30 | End: 2017-11-01 | Stop reason: HOSPADM

## 2017-10-30 RX ORDER — DEXTROSE, SODIUM CHLORIDE, AND POTASSIUM CHLORIDE 5; .45; .15 G/100ML; G/100ML; G/100ML
100 INJECTION INTRAVENOUS CONTINUOUS
Status: DISCONTINUED | OUTPATIENT
Start: 2017-10-30 | End: 2017-11-01 | Stop reason: HOSPADM

## 2017-10-30 RX ORDER — SODIUM CHLORIDE 0.9 % (FLUSH) 0.9 %
5-10 SYRINGE (ML) INJECTION EVERY 8 HOURS
Status: DISCONTINUED | OUTPATIENT
Start: 2017-10-30 | End: 2017-11-01 | Stop reason: HOSPADM

## 2017-10-30 RX ORDER — DIPHENHYDRAMINE HCL 25 MG
25 CAPSULE ORAL
Status: DISCONTINUED | OUTPATIENT
Start: 2017-10-30 | End: 2017-11-01 | Stop reason: HOSPADM

## 2017-10-30 RX ORDER — ALBUTEROL SULFATE 90 UG/1
2 AEROSOL, METERED RESPIRATORY (INHALATION)
Status: DISCONTINUED | OUTPATIENT
Start: 2017-10-30 | End: 2017-11-01 | Stop reason: HOSPADM

## 2017-10-30 RX ORDER — NEOSTIGMINE METHYLSULFATE 1 MG/ML
INJECTION INTRAVENOUS AS NEEDED
Status: DISCONTINUED | OUTPATIENT
Start: 2017-10-30 | End: 2017-10-30 | Stop reason: HOSPADM

## 2017-10-30 RX ORDER — CEFAZOLIN SODIUM IN 0.9 % NACL 2 G/50 ML
2 INTRAVENOUS SOLUTION, PIGGYBACK (ML) INTRAVENOUS EVERY 8 HOURS
Status: COMPLETED | OUTPATIENT
Start: 2017-10-30 | End: 2017-10-31

## 2017-10-30 RX ORDER — FENTANYL CITRATE 50 UG/ML
INJECTION, SOLUTION INTRAMUSCULAR; INTRAVENOUS AS NEEDED
Status: DISCONTINUED | OUTPATIENT
Start: 2017-10-30 | End: 2017-10-30 | Stop reason: HOSPADM

## 2017-10-30 RX ORDER — PROMETHAZINE HYDROCHLORIDE 25 MG/1
25 TABLET ORAL
Status: DISCONTINUED | OUTPATIENT
Start: 2017-10-30 | End: 2017-11-01 | Stop reason: HOSPADM

## 2017-10-30 RX ORDER — CEFAZOLIN SODIUM IN 0.9 % NACL 2 G/50 ML
2 INTRAVENOUS SOLUTION, PIGGYBACK (ML) INTRAVENOUS ONCE
Status: COMPLETED | OUTPATIENT
Start: 2017-10-30 | End: 2017-10-30

## 2017-10-30 RX ORDER — HYDROMORPHONE HYDROCHLORIDE 2 MG/ML
0.5 INJECTION, SOLUTION INTRAMUSCULAR; INTRAVENOUS; SUBCUTANEOUS
Status: COMPLETED | OUTPATIENT
Start: 2017-10-30 | End: 2017-10-30

## 2017-10-30 RX ORDER — ACETAMINOPHEN 10 MG/ML
1000 INJECTION, SOLUTION INTRAVENOUS
Status: COMPLETED | OUTPATIENT
Start: 2017-10-30 | End: 2017-10-31

## 2017-10-30 RX ORDER — PROPOFOL 10 MG/ML
INJECTION, EMULSION INTRAVENOUS AS NEEDED
Status: DISCONTINUED | OUTPATIENT
Start: 2017-10-30 | End: 2017-10-30 | Stop reason: HOSPADM

## 2017-10-30 RX ORDER — BUDESONIDE 0.5 MG/2ML
500 INHALANT ORAL
Status: DISCONTINUED | OUTPATIENT
Start: 2017-10-30 | End: 2017-11-01 | Stop reason: HOSPADM

## 2017-10-30 RX ORDER — ALBUTEROL SULFATE 2.5 MG/.5ML
2.5 SOLUTION RESPIRATORY (INHALATION)
Status: DISCONTINUED | OUTPATIENT
Start: 2017-10-30 | End: 2017-11-01 | Stop reason: HOSPADM

## 2017-10-30 RX ORDER — FENTANYL CITRATE 50 UG/ML
100 INJECTION, SOLUTION INTRAMUSCULAR; INTRAVENOUS ONCE
Status: DISCONTINUED | OUTPATIENT
Start: 2017-10-30 | End: 2017-10-30 | Stop reason: HOSPADM

## 2017-10-30 RX ORDER — OXYCODONE HYDROCHLORIDE 5 MG/1
5 TABLET ORAL
Status: DISCONTINUED | OUTPATIENT
Start: 2017-10-30 | End: 2017-10-30 | Stop reason: HOSPADM

## 2017-10-30 RX ORDER — HYDROMORPHONE HYDROCHLORIDE 1 MG/ML
0.5 INJECTION, SOLUTION INTRAMUSCULAR; INTRAVENOUS; SUBCUTANEOUS
Status: COMPLETED | OUTPATIENT
Start: 2017-10-30 | End: 2017-10-30

## 2017-10-30 RX ORDER — FUROSEMIDE 20 MG/1
20 TABLET ORAL
Status: DISCONTINUED | OUTPATIENT
Start: 2017-10-30 | End: 2017-11-01 | Stop reason: HOSPADM

## 2017-10-30 RX ORDER — ONDANSETRON 2 MG/ML
INJECTION INTRAMUSCULAR; INTRAVENOUS AS NEEDED
Status: DISCONTINUED | OUTPATIENT
Start: 2017-10-30 | End: 2017-10-30 | Stop reason: HOSPADM

## 2017-10-30 RX ORDER — MONTELUKAST SODIUM 10 MG/1
10 TABLET ORAL DAILY
Status: DISCONTINUED | OUTPATIENT
Start: 2017-10-31 | End: 2017-11-01 | Stop reason: HOSPADM

## 2017-10-30 RX ORDER — SODIUM CHLORIDE 0.9 % (FLUSH) 0.9 %
5-10 SYRINGE (ML) INJECTION AS NEEDED
Status: DISCONTINUED | OUTPATIENT
Start: 2017-10-30 | End: 2017-11-01 | Stop reason: HOSPADM

## 2017-10-30 RX ORDER — HYDROMORPHONE HYDROCHLORIDE 1 MG/ML
1 INJECTION, SOLUTION INTRAMUSCULAR; INTRAVENOUS; SUBCUTANEOUS
Status: DISCONTINUED | OUTPATIENT
Start: 2017-10-30 | End: 2017-11-01 | Stop reason: HOSPADM

## 2017-10-30 RX ORDER — DEXAMETHASONE SODIUM PHOSPHATE 4 MG/ML
INJECTION, SOLUTION INTRA-ARTICULAR; INTRALESIONAL; INTRAMUSCULAR; INTRAVENOUS; SOFT TISSUE AS NEEDED
Status: DISCONTINUED | OUTPATIENT
Start: 2017-10-30 | End: 2017-10-30 | Stop reason: HOSPADM

## 2017-10-30 RX ORDER — KETOROLAC TROMETHAMINE 30 MG/ML
INJECTION, SOLUTION INTRAMUSCULAR; INTRAVENOUS AS NEEDED
Status: DISCONTINUED | OUTPATIENT
Start: 2017-10-30 | End: 2017-10-30 | Stop reason: HOSPADM

## 2017-10-30 RX ORDER — HYDROCODONE BITARTRATE AND ACETAMINOPHEN 10; 325 MG/1; MG/1
1 TABLET ORAL
Qty: 30 TAB | Refills: 0 | Status: SHIPPED | OUTPATIENT
Start: 2017-10-30 | End: 2018-08-20 | Stop reason: ALTCHOICE

## 2017-10-30 RX ORDER — VANCOMYCIN HYDROCHLORIDE 1 G/20ML
INJECTION, POWDER, LYOPHILIZED, FOR SOLUTION INTRAVENOUS AS NEEDED
Status: DISCONTINUED | OUTPATIENT
Start: 2017-10-30 | End: 2017-10-30 | Stop reason: HOSPADM

## 2017-10-30 RX ORDER — MIDAZOLAM HYDROCHLORIDE 1 MG/ML
2 INJECTION, SOLUTION INTRAMUSCULAR; INTRAVENOUS ONCE
Status: DISCONTINUED | OUTPATIENT
Start: 2017-10-30 | End: 2017-10-30 | Stop reason: HOSPADM

## 2017-10-30 RX ORDER — SUMATRIPTAN 50 MG/1
50 TABLET, FILM COATED ORAL
Status: DISCONTINUED | OUTPATIENT
Start: 2017-10-30 | End: 2017-11-01 | Stop reason: HOSPADM

## 2017-10-30 RX ORDER — MIDAZOLAM HYDROCHLORIDE 1 MG/ML
2 INJECTION, SOLUTION INTRAMUSCULAR; INTRAVENOUS
Status: COMPLETED | OUTPATIENT
Start: 2017-10-30 | End: 2017-10-30

## 2017-10-30 RX ORDER — IBUPROFEN 400 MG/1
400 TABLET ORAL
Status: DISCONTINUED | OUTPATIENT
Start: 2017-10-30 | End: 2017-10-31

## 2017-10-30 RX ORDER — CYCLOBENZAPRINE HCL 10 MG
5 TABLET ORAL
Status: DISCONTINUED | OUTPATIENT
Start: 2017-10-30 | End: 2017-11-01 | Stop reason: HOSPADM

## 2017-10-30 RX ORDER — LIDOCAINE HYDROCHLORIDE 10 MG/ML
0.1 INJECTION INFILTRATION; PERINEURAL AS NEEDED
Status: DISCONTINUED | OUTPATIENT
Start: 2017-10-30 | End: 2017-10-30 | Stop reason: HOSPADM

## 2017-10-30 RX ORDER — LIDOCAINE HYDROCHLORIDE 20 MG/ML
INJECTION, SOLUTION EPIDURAL; INFILTRATION; INTRACAUDAL; PERINEURAL AS NEEDED
Status: DISCONTINUED | OUTPATIENT
Start: 2017-10-30 | End: 2017-10-30 | Stop reason: HOSPADM

## 2017-10-30 RX ORDER — GLYCOPYRROLATE 0.2 MG/ML
INJECTION INTRAMUSCULAR; INTRAVENOUS AS NEEDED
Status: DISCONTINUED | OUTPATIENT
Start: 2017-10-30 | End: 2017-10-30 | Stop reason: HOSPADM

## 2017-10-30 RX ORDER — HYDROCODONE BITARTRATE AND ACETAMINOPHEN 10; 325 MG/1; MG/1
1 TABLET ORAL
Status: DISCONTINUED | OUTPATIENT
Start: 2017-10-30 | End: 2017-11-01 | Stop reason: HOSPADM

## 2017-10-30 RX ADMIN — FENTANYL CITRATE 50 MCG: 50 INJECTION, SOLUTION INTRAMUSCULAR; INTRAVENOUS at 07:23

## 2017-10-30 RX ADMIN — HYDROMORPHONE HYDROCHLORIDE 0.5 MG: 2 INJECTION, SOLUTION INTRAMUSCULAR; INTRAVENOUS; SUBCUTANEOUS at 09:43

## 2017-10-30 RX ADMIN — BUDESONIDE 500 MCG: 0.5 INHALANT RESPIRATORY (INHALATION) at 19:11

## 2017-10-30 RX ADMIN — HYDROMORPHONE HYDROCHLORIDE 0.5 MG: 1 INJECTION, SOLUTION INTRAMUSCULAR; INTRAVENOUS; SUBCUTANEOUS at 10:38

## 2017-10-30 RX ADMIN — CEFAZOLIN 2 G: 1 INJECTION, POWDER, FOR SOLUTION INTRAMUSCULAR; INTRAVENOUS; PARENTERAL at 07:44

## 2017-10-30 RX ADMIN — ALBUTEROL SULFATE 2.5 MG: 2.5 SOLUTION RESPIRATORY (INHALATION) at 19:11

## 2017-10-30 RX ADMIN — ROCURONIUM BROMIDE 10 MG: 10 INJECTION, SOLUTION INTRAVENOUS at 08:15

## 2017-10-30 RX ADMIN — PROPOFOL 200 MG: 10 INJECTION, EMULSION INTRAVENOUS at 07:26

## 2017-10-30 RX ADMIN — HYDROMORPHONE HYDROCHLORIDE 0.5 MG: 2 INJECTION, SOLUTION INTRAMUSCULAR; INTRAVENOUS; SUBCUTANEOUS at 09:33

## 2017-10-30 RX ADMIN — LIDOCAINE HYDROCHLORIDE 60 MG: 20 INJECTION, SOLUTION EPIDURAL; INFILTRATION; INTRACAUDAL; PERINEURAL at 07:26

## 2017-10-30 RX ADMIN — FENTANYL CITRATE 50 MCG: 50 INJECTION, SOLUTION INTRAMUSCULAR; INTRAVENOUS at 07:58

## 2017-10-30 RX ADMIN — POTASSIUM CHLORIDE, DEXTROSE MONOHYDRATE AND SODIUM CHLORIDE 100 ML/HR: 150; 5; 450 INJECTION, SOLUTION INTRAVENOUS at 14:59

## 2017-10-30 RX ADMIN — NEOSTIGMINE METHYLSULFATE 3 MG: 1 INJECTION INTRAVENOUS at 09:00

## 2017-10-30 RX ADMIN — HYDROCODONE BITARTRATE AND ACETAMINOPHEN 1 TABLET: 10; 325 TABLET ORAL at 20:17

## 2017-10-30 RX ADMIN — QUETIAPINE FUMARATE 100 MG: 100 TABLET, FILM COATED ORAL at 21:14

## 2017-10-30 RX ADMIN — ACETAMINOPHEN 1000 MG: 10 INJECTION, SOLUTION INTRAVENOUS at 10:20

## 2017-10-30 RX ADMIN — HYDROMORPHONE HYDROCHLORIDE 0.5 MG: 2 INJECTION, SOLUTION INTRAMUSCULAR; INTRAVENOUS; SUBCUTANEOUS at 09:38

## 2017-10-30 RX ADMIN — HYDROMORPHONE HYDROCHLORIDE 0.5 MG: 1 INJECTION, SOLUTION INTRAMUSCULAR; INTRAVENOUS; SUBCUTANEOUS at 10:43

## 2017-10-30 RX ADMIN — ONDANSETRON 4 MG: 2 INJECTION INTRAMUSCULAR; INTRAVENOUS at 08:28

## 2017-10-30 RX ADMIN — HYDROMORPHONE HYDROCHLORIDE 0.5 MG: 2 INJECTION, SOLUTION INTRAMUSCULAR; INTRAVENOUS; SUBCUTANEOUS at 10:00

## 2017-10-30 RX ADMIN — SODIUM CHLORIDE, SODIUM LACTATE, POTASSIUM CHLORIDE, AND CALCIUM CHLORIDE: 600; 310; 30; 20 INJECTION, SOLUTION INTRAVENOUS at 08:51

## 2017-10-30 RX ADMIN — Medication 5 ML: at 15:00

## 2017-10-30 RX ADMIN — CLONAZEPAM 1 MG: 1 TABLET ORAL at 21:14

## 2017-10-30 RX ADMIN — SODIUM CHLORIDE, SODIUM LACTATE, POTASSIUM CHLORIDE, AND CALCIUM CHLORIDE 100 ML/HR: 600; 310; 30; 20 INJECTION, SOLUTION INTRAVENOUS at 06:12

## 2017-10-30 RX ADMIN — FAMOTIDINE 20 MG: 20 TABLET, FILM COATED ORAL at 20:17

## 2017-10-30 RX ADMIN — ALBUTEROL SULFATE 2.5 MG: 2.5 SOLUTION RESPIRATORY (INHALATION) at 13:36

## 2017-10-30 RX ADMIN — HYDROCODONE BITARTRATE AND ACETAMINOPHEN 1 TABLET: 10; 325 TABLET ORAL at 14:59

## 2017-10-30 RX ADMIN — DEXAMETHASONE SODIUM PHOSPHATE 4 MG: 4 INJECTION, SOLUTION INTRA-ARTICULAR; INTRALESIONAL; INTRAMUSCULAR; INTRAVENOUS; SOFT TISSUE at 07:44

## 2017-10-30 RX ADMIN — PROPOFOL 20 MG: 10 INJECTION, EMULSION INTRAVENOUS at 09:01

## 2017-10-30 RX ADMIN — ROCURONIUM BROMIDE 40 MG: 10 INJECTION, SOLUTION INTRAVENOUS at 07:26

## 2017-10-30 RX ADMIN — IBUPROFEN 400 MG: 400 TABLET, FILM COATED ORAL at 18:14

## 2017-10-30 RX ADMIN — QUETIAPINE FUMARATE 100 MG: 100 TABLET, FILM COATED ORAL at 17:02

## 2017-10-30 RX ADMIN — KETOROLAC TROMETHAMINE 30 MG: 30 INJECTION, SOLUTION INTRAMUSCULAR; INTRAVENOUS at 08:59

## 2017-10-30 RX ADMIN — GLYCOPYRROLATE 0.4 MG: 0.2 INJECTION INTRAMUSCULAR; INTRAVENOUS at 09:00

## 2017-10-30 RX ADMIN — MIDAZOLAM HYDROCHLORIDE 2 MG: 1 INJECTION, SOLUTION INTRAMUSCULAR; INTRAVENOUS at 07:12

## 2017-10-30 RX ADMIN — SODIUM CHLORIDE 6.25 MG: 9 INJECTION INTRAMUSCULAR; INTRAVENOUS; SUBCUTANEOUS at 09:36

## 2017-10-30 RX ADMIN — FENTANYL CITRATE 25 MCG: 50 INJECTION, SOLUTION INTRAMUSCULAR; INTRAVENOUS at 08:45

## 2017-10-30 RX ADMIN — CEFAZOLIN 2 G: 1 INJECTION, POWDER, FOR SOLUTION INTRAMUSCULAR; INTRAVENOUS; PARENTERAL at 15:00

## 2017-10-30 RX ADMIN — Medication 10 ML: at 21:15

## 2017-10-30 RX ADMIN — GABAPENTIN 600 MG: 300 CAPSULE ORAL at 17:02

## 2017-10-30 NOTE — PERIOP NOTES
TRANSFER - OUT REPORT:    Verbal report given to Miller Case on Kristine Leos  being transferred to  for routine post - op       Report consisted of patients Situation, Background, Assessment and   Recommendations(SBAR). Information from the following report(s) SBAR, OR Summary, Procedure Summary, Intake/Output and MAR was reviewed with the receiving nurse. Lines:   Peripheral IV 10/30/17 Right Hand (Active)   Site Assessment Clean, dry, & intact 10/30/2017  9:29 AM   Phlebitis Assessment 0 10/30/2017  9:29 AM   Infiltration Assessment 0 10/30/2017  9:29 AM   Dressing Status Clean, dry, & intact; Occlusive 10/30/2017  9:29 AM   Dressing Type Transparent;Tape 10/30/2017  9:29 AM   Hub Color/Line Status Pink; Infusing 10/30/2017  9:29 AM   Alcohol Cap Used No 10/30/2017  9:29 AM        Opportunity for questions and clarification was provided. Patient transported with:   O2 @ 2 liters    VTE prophylaxis orders have been written for Kristine Leos. Patient and family given floor number and nurses name. Family updated re: pt status after security code verified.

## 2017-10-30 NOTE — OP NOTES
[facility]   OPERATIVE REPORT       Name:  Lazara Pagan   MR#:  [id number]   :  1975   Account #:  [de-identified]   Date of Adm:  10/30/2017       DATE OF SURGERY: 10/30/2017. SURGEON: Wyatt Oconnor III, MD.     Ginger Cifuentes, physician's assistant. PREPROCEDURE DIAGNOSIS: Lumbar spinal stenosis, L4-L5 and L5-S1. POSTPROCEDURE DIAGNOSIS: Lumbar spinal stenosis, L4-5 and L5-S1. PROCEDURES: Bilateral total laminectomy of L4, L5, and S1 with   partial facetectomy and foraminotomy. ANESTHESIA: GETA. ESTIMATED BLOOD LOSS: 100 mL. FLUIDS: A liter of crystalloid. DRAINS: None. SPECIMENS: None. INDICATIONS: The patient is a 71-year-old female who has had   ongoing back and bilateral leg pain, left leg worse than the   right. She failed conservative measures with activity   restriction, physical therapy, epidural steroid injections, so   she is brought for surgical treatment. PROCEDURE: The patient was brought to the operating room. After   administration of anesthesia, IV antibiotics, and placement of   monitoring lines, she was positioned prone on the Shy Haver frame. Her back was prepped with Betadine and sterilely draped. At this   point, surgeon called a timeout, confirmed the procedure to be   performed, her allergy history, and the fact that she had   received her preoperative IV antibiotics. In addition, it should   be noted that an assistant was medically necessary for this case   because of the extensive soft tissue and neural retraction   involved. C-arm was brought in. We identified the L4-5 and L5-S1   levels, marked them on the skin, made a midline incision over   this, dissected down through the subcutaneous tissue with   electrocautery to the deep fascia. Deep fascia was incised on   either sides of the spinous processes and we dissected down   along the lamina out to the facet joint bilaterally.  We placed   an angled curette in the interlaminar space, took a lateral C-  arm x-ray to confirm we were at L4-5. We placed our self-  retaining retractors and then we noted that manually there was a   lot of laxity at the L5-S1 facet joint, a lot of the exuberant   synovium bulging out. The L4-5 level seemed much more stable, so   at this point, we removed the spinous processes with a rongeur   and burred down the lamina bilaterally with a high-speed bur. Usually we leave a small portion of the spinous process for   identification purposes if there is a re-surgery and initially   we did that, but eventually in order to remove all of the   stenosis, we had to take all the L4 spinous process and lamina   and remove the ligamentum flavum from the 3-4 level above. Once   we burred the lamina and the patient had majority left leg pain,   so we burred more lamina on the left than the right, then we   stripped the ligamentum off the bony surfaces with an angled   curette and then carefully performed a bilateral L4, L5 and S1   laminectomy. We undercut the facet joints, removed all the   ligamentum flavum and spurs and we performed foraminotomies. We   identified the nerve roots and traced them out through the L4-5   and S1 foramen and after we had completed this, the thecal sac   and nerve roots were all free. Once again, there was noted   laxity at L5-S1 and there was exuberant synovium that would come   out the facet joint and probably is going to develop synovial   cyst and instability over time as well. But with everything   being free, we thoroughly irrigated multiple times, suctioned it   dry. There was a lot of epidural bleeding so we were able to   manage this satisfactorily with just FloSeal and cottonoids and   then once the bleeding stopped, we removed the cottonoids and at   this point, we were ready to close the wound.  We placed   vancomycin powder in the wound and then closed the deep fascia   with interrupted figure-of-eight stitches of #1 Vicryl. We   placed some more vancomycin powder on top of the deep fascia and   then closed the subcutaneous tissue with interrupted stitches of   2-0 Vicryl and the skin was closed with a running subcuticular   stitch of 3-0 Vicryl. Steri-Strips and dry sterile dressings   were applied. The patient was then rolled supine onto the   recovery room bed, having tolerated the procedure well.         MD DMITRY Harvey / Ac Dickey   D:  10/30/2017   09:25   T:  10/30/2017   13:56   Job #:  [id number]

## 2017-10-30 NOTE — PROGRESS NOTES
Spiritual Care visit. Initial visit attempted. Patient declined.     Visit by Iqra Claros M.Ed., .B. ,Staff

## 2017-10-30 NOTE — ANESTHESIA PREPROCEDURE EVALUATION
Anesthetic History   No history of anesthetic complications            Review of Systems / Medical History  Pertinent labs reviewed    Pulmonary            Asthma : well controlled       Neuro/Psych         Psychiatric history     Cardiovascular  Within defined limits                Exercise tolerance: >4 METS     GI/Hepatic/Renal     GERD: well controlled           Endo/Other        Anemia     Other Findings            Physical Exam    Airway  Mallampati: II  TM Distance: 4 - 6 cm  Neck ROM: normal range of motion   Mouth opening: Normal     Cardiovascular  Regular rate and rhythm,  S1 and S2 normal,  no murmur, click, rub, or gallop             Dental  No notable dental hx       Pulmonary  Breath sounds clear to auscultation               Abdominal  GI exam deferred       Other Findings            Anesthetic Plan    ASA: 2  Anesthesia type: general          Induction: Intravenous  Anesthetic plan and risks discussed with: Patient      POVL and positioning discussed.

## 2017-10-30 NOTE — IP AVS SNAPSHOT
[facility] 
 
 
 [address] 
[phone] Patient: Shalom Perales MRN: [id number] :1975 About your hospitalization You were admitted on:  2017 You last received care in the:  [facility] 7 MED SURG You were discharged on:  2017 Why you were hospitalized Your primary diagnosis was:  Spinal Stenosis Of Lumbar Region Your diagnoses also included:  Spinal Stenosis Things You Need To Do (next 8 weeks) Call Stephanie Hurt MD today As needed Phone:  [phone] Where:  [address] [place] [address] [address], 058 [facility], [address]  Follow up with Annie Morillo MD  
10:10 AM @ 7 [facility] Phone:  [phone] Where:  [place] [address], [facility] [address] Discharge Orders None A check elan indicates which time of day the medication should be taken. My Medications TAKE these medications as instructed Instructions Each Dose to Equal  
 Morning Noon Evening Bedtime  
 acyclovir 400 mg tablet Commonly known as:  ZOVIRAX Your next dose is: Take on as needed schedule TAKE 1 TABLET BY MOUTH TWICE A DAY as needed ADVAIR DISKUS 250-50 mcg/dose diskus inhaler Generic drug:  fluticasone-salmeterol Your next dose is: This evening Take 1 Puff by inhalation two (2) times a day. 1 Puff  
    
  
   
   
  
   
  
 albuterol 90 mcg/actuation inhaler Commonly known as:  PROVENTIL HFA, VENTOLIN HFA, PROAIR HFA Your next dose is: Take on as needed schedule Take 2 Puffs by inhalation every four (4) hours as needed. 2 Puff  
    
   
   
   
  
 aspirin 325 mg tablet Commonly known as:  ASPIRIN Your next dose is: Take on as needed schedule Take 325 mg by mouth every six (6) hours as needed for Pain. 325 mg  
    
   
   
   
  
 furosemide 20 mg tablet Commonly known as:  LASIX Your next dose is: Take on as needed schedule Take 1 Tab by mouth daily as needed. 20 mg  
    
   
   
   
  
 gabapentin 600 mg tablet Commonly known as:  NEURONTIN Your next dose is:  TODAY, evening and bedtime Take 0.5 Tabs by mouth three (3) times daily. 300 mg HYDROcodone-acetaminophen  mg tablet Commonly known as:  Benetta Pock Your next dose is: Take on as needed schedule Take 1 Tab by mouth every six (6) hours as needed for Pain. Max Daily Amount: 4 Tabs. 1 Tab KlonoPIN 1 mg tablet Generic drug:  clonazePAM  
Your next dose is: Take tonight Take 1 mg by mouth nightly. 1 mg  
    
   
   
   
  
  
 montelukast 10 mg tablet Commonly known as:  SINGULAIR Your next dose is:  Tomorrow Morning Take 1 Tab by mouth daily. 10 mg  
    
  
   
   
   
  
 ondansetron 8 mg disintegrating tablet Commonly known as:  ZOFRAN ODT Your next dose is: Take on as needed schedule Take 1 Tab by mouth every eight (8) hours as needed for Nausea. 8 mg  
    
   
   
   
  
 promethazine 25 mg tablet Commonly known as:  PHENERGAN Your next dose is: Take on as needed schedule Take 1 Tab by mouth every six (6) hours as needed for Nausea (associated with migraines). 25 mg SEROquel 100 mg tablet Generic drug:  QUEtiapine Your next dose is:  TODAY, evening and bedtime Take 100 mg by mouth three (3) times daily. 100 mg  
    
   
   
   
  
 SUMAtriptan 50 mg tablet Commonly known as:  IMITREX Take 1 Tab by mouth once as needed for Migraine (may repeat after 2 hours if needed, max 2 tablets in 24 hours). 50 mg  
    
  
   
   
  
   
  
  
 SYMBICORT 160-4.5 mcg/actuation Hfaa Generic drug:  budesonide-formoterol Your next dose is: Take on as needed schedule Take 2 Puffs by inhalation daily as needed. 2 Puff Where to Get Your Medications Information on where to get these meds will be given to you by the nurse or doctor. ! Ask your nurse or doctor about these medications HYDROcodone-acetaminophen  mg tablet Discharge Instructions MAY shower NO tub bathing LEAVE dressing on incision for 3 DAYS-->then may remove (If dressing starts to fall off may re-secure with tape) NO lifting anything heavier than 5LBS  
 
NO Bending, Lifting or Twisting NO driving until directed by your doctor Avoid sitting more than 20 - 30 minutes at a time DO NOT take any NSAIDS (either prescribed or over the counter until directed (Aleve, Ibuprofen, Mobic, etc) as this will interfere with bone healing CALL the doctor if:  Fever >100.5 
([id number])                 Incision becomes red, swollen or  opens up Incision has yellow, thick drainage or an odor Pain is not managed with prescribed medications Excessive nausea and/or vomiting Avoid having pets sleep in bed with you until incision is completely healed DISCHARGE SUMMARY from Nurse PATIENT INSTRUCTIONS: 
 
 
F-face looks uneven A-arms unable to move or move unevenly S-speech slurred or non-existent T-time-call 911 as soon as signs and symptoms begin-DO NOT go Back to bed or wait to see if you get better-TIME IS BRAIN. Warning Signs of HEART ATTACK Call 911 if you have these symptoms: 
? Chest discomfort.  Most heart attacks involve discomfort in the center of the chest that lasts more than a few minutes, or that goes away and comes back. It can feel like uncomfortable pressure, squeezing, fullness, or pain. ? Discomfort in other areas of the upper body. Symptoms can include pain or discomfort in one or both arms, the back, neck, jaw, or stomach. ? Shortness of breath with or without chest discomfort. ? Other signs may include breaking out in a cold sweat, nausea, or lightheadedness. Don't wait more than five minutes to call [address]! Fast action can save your life. Calling 911 is almost always the fastest way to get lifesaving treatment. Emergency Medical Services staff can begin treatment when they arrive  up to an hour sooner than if someone gets to the hospital by car. The discharge information has been reviewed with the patient. The patient verbalized understanding. Discharge medications reviewed with the patient and appropriate educational materials and side effects teaching were provided. ___________________________________________________________________________________________________________________________________ [facility] Announcement We are excited to announce that we are making your provider's discharge notes available to you in [facility]. You will see these notes when they are completed and signed by the physician that discharged you from your recent hospital stay. If you have any questions or concerns about any information you see in [facility], please call the Health Information Department where you were seen or reach out to your Primary Care Provider for more information about your plan of care. Introducing [facility] SERVICES! Dima Aguilar introduces [facility] patient portal. Now you can access parts of your medical record, email your doctor's office, and request medication refills online. 1. In your internet browser, go to [email or web address] 2. Click on the First Time User? Click Here link in the Sign In box. You will see the New Member Sign Up page. 3. Enter your [employer] Access Code exactly as it appears below. You will not need to use this code after youve completed the sign-up process. If you do not sign up before the expiration date, you must request a new code. · [employer] Access Code: [email or web address] Expires: 11/18/2017  9:12 AM 
 
4. Enter the last four digits of your Social Security Number (xxxx) and Date of Birth (mm/dd/yyyy) as indicated and click Submit. You will be taken to the next sign-up page. 5. Create a [employer] ID. This will be your [employer] login ID and cannot be changed, so think of one that is secure and easy to remember. 6. Create a [employer] password. You can change your password at any time. 7. Enter your Password Reset Question and Answer. This can be used at a later time if you forget your password. 8. Enter your e-mail address. You will receive e-mail notification when new information is available in [address] 9. Click Sign Up. You can now view and download portions of your medical record. 10. Click the Download Summary menu link to download a portable copy of your medical information. If you have questions, please visit the Frequently Asked Questions section of the [employer] website. Remember, [employer] is NOT to be used for urgent needs. For medical emergencies, dial 911. Now available from your iPhone and Android! Providers Seen During Your Hospitalization Provider Specialty Primary office phone Dell Cheung MD Orthopedic Surgery [phone] Your Primary Care Physician (PCP) Primary Care Physician Office Phone Office Fax [facility] [place] You are allergic to the following Allergen Reactions Tramadol Rash Recent Documentation Height Weight Breastfeeding? BMI OB Status Smoking Status 1.702 m 74.4 kg No 25.71 kg/m2 Having regular periods Never Smoker Emergency Contacts Name Discharge Info Relation Home Work Mobile Dell Batista  Friend [5] [phone] Darryl Donald  Son [22] Patient Belongings The following personal items are in your possession at time of discharge: 
  Dental Appliances: None  Visual Aid: Glasses   Hearing Aids/Status: Does not own      Jewelry: None  Clothing: Shirt, Pants, Footwear, Undergarments    Other Valuables: None Please provide this summary of care documentation to your next provider. Signatures-by signing, you are acknowledging that this After Visit Summary has been reviewed with you and you have received a copy. Patient Signature:  ____________________________________________________________ Date:  ____________________________________________________________  
  
[place] Provider Signature:  ____________________________________________________________ Date:  ____________________________________________________________

## 2017-10-30 NOTE — ANESTHESIA POSTPROCEDURE EVALUATION
Post-Anesthesia Evaluation and Assessment    Patient: Abena Eng MRN: [id number]  SSN: [id number]    YOB: 1975  Age: 40 y.o. Sex: female       Cardiovascular Function/Vital Signs  Visit Vitals    BP 96/54    Pulse 74    Temp 36.3 °C (97.4 °F)    Resp 18    Ht 5' 7\" (1.702 m)    Wt 74.4 kg (164 lb 2 oz)    SpO2 97%    BMI 25.71 kg/m2       Patient is status post general anesthesia for Procedure(s):  L4-S1 LAMINECTOMY   . Nausea/Vomiting: None    Postoperative hydration reviewed and adequate. Pain:  Pain Scale 1: Numeric (0 - 10) (10/30/17 1043)  Pain Intensity 1: 5 (10/30/17 1043)   Managed    Neurological Status:   Neuro (WDL): Within Defined Limits (10/30/17 0929)  Neuro  LUE Motor Response: Purposeful (10/30/17 0929)  LLE Motor Response: Purposeful (10/30/17 0929)  RUE Motor Response: Purposeful (10/30/17 0929)  RLE Motor Response: Purposeful (10/30/17 0929)   At baseline    Mental Status and Level of Consciousness: Awake. Pulmonary Status:   O2 Device: Nasal cannula (10/30/17 0929)   Adequate oxygenation and airway patent    Complications related to anesthesia: None    Post-anesthesia assessment completed.  No concerns    Signed By: Lyric Rollins MD     October 30, 2017

## 2017-10-30 NOTE — PROGRESS NOTES
TRANSFER - IN REPORT:    Verbal report received from Ayden Daniel RN(name) on Erma Alpers  being received from [employer]) for routine post - op      Report consisted of patients Situation, Background, Assessment and   Recommendations(SBAR). Information from the following report(s) SBAR and Kardex was reviewed with the receiving nurse. Opportunity for questions and clarification was provided. Report given to primary RN Isabel Campos.

## 2017-10-30 NOTE — PROGRESS NOTES
Dual Skin Assessment    Skin assessment completed with Kina Farrar RN. See below. Wound Spine Posterior (Active)   DRESSING STATUS Clean, dry, and intact 10/30/2017 11:15 AM   DRESSING TYPE 4 x 4;Adhesive wound closure strips (Steri-Strips); Adhesive wound dressing (Mastisol); Transparent film 10/30/2017  7:00 AM       Wound Back (Active)        Nestornypatel Saldana    10/30/2017

## 2017-10-30 NOTE — BRIEF OP NOTE
BRIEF OPERATIVE NOTE    Date of Procedure: 10/30/2017   Preoperative Diagnosis: Lumbar spinal stenosis [M48.06]  Postoperative Diagnosis: Lumbar spinal stenosis [M48.06]    Procedure(s):  L4-S1 LAMINECTOMY     Surgeon(s) and Role:     * Nicky Lockhart MD - Primary         Assistant Staff:  Physician Assistant: GUERLINE Lawson    Surgical Staff:  Circ-1: Pete Nelson RN  Circ-Relief: Aayush Mccabe  Physician Assistant: GUERLINE Lawson  Scrub Tech-1: Wilber Mckeon  Scrub Tech-2: Andrés Christopher  Event Time In   Incision Start 6748   Incision Close      Anesthesia: General   Estimated Blood Loss: 100cc  Specimens: * No specimens in log *   Findings: stenosis and L5-S1 facet laxity  Complications: none  Implants: * No implants in log *

## 2017-10-31 PROCEDURE — 97530 THERAPEUTIC ACTIVITIES: CPT

## 2017-10-31 PROCEDURE — 74011250637 HC RX REV CODE- 250/637: Performed by: ORTHOPAEDIC SURGERY

## 2017-10-31 PROCEDURE — 94640 AIRWAY INHALATION TREATMENT: CPT

## 2017-10-31 PROCEDURE — 99218 HC RM OBSERVATION: CPT

## 2017-10-31 PROCEDURE — 97161 PT EVAL LOW COMPLEX 20 MIN: CPT

## 2017-10-31 PROCEDURE — 74011250636 HC RX REV CODE- 250/636: Performed by: ORTHOPAEDIC SURGERY

## 2017-10-31 PROCEDURE — 94760 N-INVAS EAR/PLS OXIMETRY 1: CPT

## 2017-10-31 PROCEDURE — 74011000250 HC RX REV CODE- 250: Performed by: ORTHOPAEDIC SURGERY

## 2017-10-31 PROCEDURE — 74011250637 HC RX REV CODE- 250/637: Performed by: HOSPITALIST

## 2017-10-31 RX ORDER — IBUPROFEN 400 MG/1
800 TABLET ORAL
Status: DISCONTINUED | OUTPATIENT
Start: 2017-10-31 | End: 2017-11-01 | Stop reason: HOSPADM

## 2017-10-31 RX ORDER — SUMATRIPTAN 50 MG/1
50 TABLET, FILM COATED ORAL
Status: COMPLETED | OUTPATIENT
Start: 2017-10-31 | End: 2017-10-31

## 2017-10-31 RX ADMIN — HYDROCODONE BITARTRATE AND ACETAMINOPHEN 1 TABLET: 10; 325 TABLET ORAL at 21:27

## 2017-10-31 RX ADMIN — QUETIAPINE FUMARATE 100 MG: 100 TABLET, FILM COATED ORAL at 08:25

## 2017-10-31 RX ADMIN — Medication 10 ML: at 21:27

## 2017-10-31 RX ADMIN — BUDESONIDE 500 MCG: 0.5 INHALANT RESPIRATORY (INHALATION) at 21:42

## 2017-10-31 RX ADMIN — GABAPENTIN 600 MG: 300 CAPSULE ORAL at 17:51

## 2017-10-31 RX ADMIN — QUETIAPINE FUMARATE 100 MG: 100 TABLET, FILM COATED ORAL at 21:27

## 2017-10-31 RX ADMIN — CEFAZOLIN 2 G: 1 INJECTION, POWDER, FOR SOLUTION INTRAMUSCULAR; INTRAVENOUS; PARENTERAL at 08:37

## 2017-10-31 RX ADMIN — CLONAZEPAM 1 MG: 1 TABLET ORAL at 21:27

## 2017-10-31 RX ADMIN — ONDANSETRON 4 MG: 2 INJECTION INTRAMUSCULAR; INTRAVENOUS at 17:50

## 2017-10-31 RX ADMIN — SUMATRIPTAN SUCCINATE 50 MG: 50 TABLET, FILM COATED ORAL at 11:47

## 2017-10-31 RX ADMIN — ALBUTEROL SULFATE 2.5 MG: 2.5 SOLUTION RESPIRATORY (INHALATION) at 07:12

## 2017-10-31 RX ADMIN — GABAPENTIN 600 MG: 300 CAPSULE ORAL at 08:24

## 2017-10-31 RX ADMIN — ALBUTEROL SULFATE 2.5 MG: 2.5 SOLUTION RESPIRATORY (INHALATION) at 14:12

## 2017-10-31 RX ADMIN — MONTELUKAST SODIUM 10 MG: 10 TABLET, FILM COATED ORAL at 08:34

## 2017-10-31 RX ADMIN — Medication 5 ML: at 16:10

## 2017-10-31 RX ADMIN — CEFAZOLIN 2 G: 1 INJECTION, POWDER, FOR SOLUTION INTRAMUSCULAR; INTRAVENOUS; PARENTERAL at 00:16

## 2017-10-31 RX ADMIN — ONDANSETRON 4 MG: 2 INJECTION INTRAMUSCULAR; INTRAVENOUS at 22:20

## 2017-10-31 RX ADMIN — HYDROCODONE BITARTRATE AND ACETAMINOPHEN 1 TABLET: 10; 325 TABLET ORAL at 03:21

## 2017-10-31 RX ADMIN — SUMATRIPTAN SUCCINATE 50 MG: 50 TABLET ORAL at 22:51

## 2017-10-31 RX ADMIN — FAMOTIDINE 20 MG: 20 TABLET, FILM COATED ORAL at 08:24

## 2017-10-31 RX ADMIN — QUETIAPINE FUMARATE 100 MG: 100 TABLET, FILM COATED ORAL at 16:10

## 2017-10-31 RX ADMIN — BUDESONIDE 500 MCG: 0.5 INHALANT RESPIRATORY (INHALATION) at 07:12

## 2017-10-31 RX ADMIN — IBUPROFEN 400 MG: 400 TABLET, FILM COATED ORAL at 08:34

## 2017-10-31 RX ADMIN — HYDROCODONE BITARTRATE AND ACETAMINOPHEN 1 TABLET: 10; 325 TABLET ORAL at 16:11

## 2017-10-31 RX ADMIN — Medication 10 ML: at 06:00

## 2017-10-31 RX ADMIN — FAMOTIDINE 20 MG: 20 TABLET, FILM COATED ORAL at 21:27

## 2017-10-31 RX ADMIN — ALBUTEROL SULFATE 2.5 MG: 2.5 SOLUTION RESPIRATORY (INHALATION) at 21:42

## 2017-10-31 RX ADMIN — IBUPROFEN 400 MG: 400 TABLET, FILM COATED ORAL at 00:24

## 2017-10-31 RX ADMIN — ONDANSETRON 4 MG: 2 INJECTION INTRAMUSCULAR; INTRAVENOUS at 08:22

## 2017-10-31 RX ADMIN — HYDROCODONE BITARTRATE AND ACETAMINOPHEN 1 TABLET: 10; 325 TABLET ORAL at 11:16

## 2017-10-31 NOTE — PROGRESS NOTES
Problem: Mobility Impaired (Adult and Pediatric)  Goal: *Acute Goals and Plan of Care (Insert Text)  Discharge Goals:  (1.)Ms. Radha Saravia will perform bed mobility via log roll with INDEPENDENCE within 7 day(s). (2.)Ms. Radha Saravia will transfer from bed to chair and chair to bed with MODIFIED INDEPENDENCE using the least restrictive device within 7 day(s). (3.)Ms. Radha Saravia will ambulate with MODIFIED INDEPENDENCE for 300+ feet with the least restrictive device within 7 day(s). (4.)Ms. Radha Saravia will demonstrate good dynamic standing balance utilizing least restrictive assistive device within 7 day(s). (5.)Ms. Radha Saravia will verbalize and follow 3/3 spinal precautions 100% of the time with 0 verbal cues within 7 day(s). ________________________________________________________________________________________________      PHYSICAL THERAPY: Daily Note, Treatment Day: Day of Assessment, PM 10/31/2017  OBSERVATION: Hospital Day: 2  Payor: Maggie Yeager / Plan: [facility] / Product Type: PPO /      Spinal Precautions     NAME/AGE/GENDER: Cate Hoyt is a 40 y.o. female   PRIMARY DIAGNOSIS: Lumbar spinal stenosis [M48.061] Spinal stenosis of lumbar region Spinal stenosis of lumbar region  Procedure(s) (LRB):  L4-S1 LAMINECTOMY    (N/A)  1 Day Post-Op  ICD-10: Treatment Diagnosis:   · Difficulty in walking, Not elsewhere classified (R26.2)  · Low Back Pain (M54.5)   Precaution/Allergies:  Tramadol      ASSESSMENT:     Ms. Radha Saravia is a 40year old female 1 day s/p L4-S1 laminectomy with spinal precautions. PM: Patient presents supine in bed, endorses 8/10 low back and L LE pain, and agreeable to participate. Performed bed mobility via log roll with CGA, transfers with CGA, and ambulation x75ft with RW and close CGA. Demonstrated a slow, shuffled, step-to gait pattern with functional B LE weakness and fair dynamic balance throughout.  C/o 8/10 pain with mobility resulting in decreased ambulation tolerance and heavy reliance on RW with B UEs. Patient no agreeable to utilize RW at first but necessary to use secondary to LE weakness and pain; educated. Reviewed precautions, positioning, and plan of care with patient ; verbalized understating.  was able to to increase her gait distance this PM, but continues to be limited by pain. Continue with stated POC. This section established at most recent assessment   PROBLEM LIST (Impairments causing functional limitations):  1. Decreased Strength  2. Decreased ADL/Functional Activities  3. Decreased Transfer Abilities  4. Decreased Ambulation Ability/Technique  5. Decreased Balance  6. Increased Pain  7. Decreased Activity Tolerance  8. Decreased Knowledge of Precautions  9. Decreased [place] with Home Exercise Program   INTERVENTIONS PLANNED: (Benefits and precautions of physical therapy have been discussed with the patient.)  1. Balance Exercise  2. Bed Mobility  3. Family Education  4. Gait Training  5. Home Exercise Program (HEP)  6. Neuromuscular Re-education/Strengthening  7. Range of Motion (ROM)  8. Therapeutic Activites  9. Therapeutic Exercise/Strengthening  10. Transfer Training  11. Group Therapy     TREATMENT PLAN: Frequency/Duration: 3 times a week for duration of hospital stay  Rehabilitation Potential For Stated Goals: Good     RECOMMENDED REHABILITATION/EQUIPMENT: (at time of discharge pending progress): Due to the probability of continued deficits (see above) this patient will likely need continued skilled physical therapy after discharge. Equipment:    None at this time              HISTORY:   History of Present Injury/Illness (Reason for Referral):  S/p L4-S1 laminectomy  Past Medical History/Comorbidities:   Ms. Lavon Cogan  has a past medical history of Acid reflux; Anemia; Anxiety; Arthralgia of multiple sites; Asthma; Bipolar 1 disorder ([facility] [address]  Chronic pain; Depressive disorder; Genital herpes; MRSA (methicillin resistant staph aureus) culture positive; and Spinal stenosis. She also has no past medical history of Adverse effect of anesthesia; Difficult intubation; Malignant hyperthermia due to anesthesia; Nausea & vomiting; or Pseudocholinesterase deficiency. Ms. Genia Garcia  has a past surgical history that includes tubal ligation. Social History/Living Environment:   Home Environment: Private residence  # Steps to Enter: 1  One/Two Story Residence: One story  Living Alone: No  Support Systems: Child(lakisha) (Son)  Patient Expects to be Discharged to[de-identified] Private residence  Current DME Used/Available at Home: Cane, straight  Prior Level of Function/Work/Activity:  Patient lives with her son in a single story residence with 1 steps to enter. At baseline, patient is independent with ADLs/IADLs, ambulates with a SPC PRN, drives, and works at a desk job. Number of Personal Factors/Comorbidities that affect the Plan of Care: 0: LOW COMPLEXITY   EXAMINATION:   Most Recent Physical Functioning:   Gross Assessment:  AROM: Within functional limits (B UE/LE)  Strength: Generally decreased, functional (4-/5 B LE functionally; 5/5 distal power)  Coordination: Within functional limits (B UE/LE)  Sensation: Intact (L3-S1 B)               Posture:  Posture (WDL): Exceptions to WDL  Posture Assessment: Forward head  Balance:  Sitting: Impaired  Sitting - Static: Good (unsupported)  Sitting - Dynamic: Good (unsupported)  Standing: Impaired  Standing - Static: Fair  Standing - Dynamic : Fair Bed Mobility:  Rolling: Supervision  Supine to Sit: Contact guard assistance  Scooting: Contact guard assistance  Wheelchair Mobility:     Transfers:  Sit to Stand: Contact guard assistance  Stand to Sit: Contact guard assistance  Bed to Chair: Contact guard assistance  Gait:     Base of Support: Narrowed; Center of gravity altered  Speed/Donna: Slow;Shuffled  Step Length: Right shortened;Left shortened  Gait Abnormalities: Decreased step clearance;Shuffling gait;Step to gait  Distance (ft): 75 Feet (ft)  Assistive Device: Walker, rolling  Ambulation - Level of Assistance: Contact guard assistance  Interventions: Safety awareness training; Tactile cues; Verbal cues; Visual/Demos      Body Structures Involved:  1. Nerves  2. Bones  3. Joints  4. Muscles  5. Ligaments Body Functions Affected:  1. Neuromusculoskeletal  2. Movement Related Activities and Participation Affected:  1. Mobility  2. Self Care   Number of elements that affect the Plan of Care: 1-2: LOW COMPLEXITY   CLINICAL PRESENTATION:   Presentation: Stable and uncomplicated: LOW COMPLEXITY   CLINICAL DECISION MAKIN[address] [place] [address] AM-PAC 6 Clicks   Basic Mobility Inpatient Short Form  How much difficulty does the patient currently have. .. Unable A Lot A Little None   1. Turning over in bed (including adjusting bedclothes, sheets and blankets)? [] 1   [] 2   [] 3   [x] 4   2. Sitting down on and standing up from a chair with arms ( e.g., wheelchair, bedside commode, etc.)   [] 1   [] 2   [x] 3   [] 4   3. Moving from lying on back to sitting on the side of the bed? [] 1   [] 2   [x] 3   [] 4   How much help from another person does the patient currently need. .. Total A Lot A Little None   4. Moving to and from a bed to a chair (including a wheelchair)? [] 1   [] 2   [x] 3   [] 4   5. Need to walk in hospital room? [] 1   [] 2   [x] 3   [] 4   6. Climbing 3-5 steps with a railing? [] 1   [] 2   [x] 3   [] 4   © , Trustees of [address] [place] [address], under license to [employer]. All rights reserved      Score:  Initial: 19 Most Recent: 19 (Date: 10/31/17 )    Interpretation of Tool:  Represents activities that are increasingly more difficult (i.e. Bed mobility, Transfers, Gait). Score 24 23 22-20 19-15 14-10 9-7 6     Modifier CH CI CJ CK CL CM CN      ?  Mobility - Walking and Moving Around:     - CURRENT STATUS: CK - 40%-59% impaired, limited or restricted    - GOAL STATUS: CJ - 20%-39% impaired, limited or restricted    - D/C STATUS:  ---------------To be determined---------------  Payor: BLUE CROSS / Plan: [place] BLUE CR[place] [facility] / Product Type: PPO /      Medical Necessity:     · Patient demonstrates good rehab potential due to higher previous functional level. Reason for Services/Other Comments:  · Patient continues to require skilled intervention due to decreased functional mobility and balance/gait status from baseline. .   Use of outcome tool(s) and clinical judgement create a POC that gives a: Clear prediction of patient's progress: LOW COMPLEXITY            TREATMENT:   (In addition to Assessment/Re-Assessment sessions the following treatments were rendered)   Pre-treatment Symptoms/Complaints:    Pain: Initial:   Pain Intensity 1: 8  Pain Location 1: Back, Leg  Pain Orientation 1: Lower, Left  Pain Intervention(s) 1: Ambulation/Increased Activity, Repositioned, Position, Emotional support  Post Session:  8/10 in straight back chair     Therapeutic Activity: (    16 Minutes): Therapeutic activities including bed mobility, transfer training, balance training, safety awareness training, ambulation on level ground x75ft, and patient education to improve mobility, strength, balance and coordination. Required minimal Safety awareness training; Tactile cues; Verbal cues; Visual/Demos to promote static and dynamic balance in standing. Braces/Orthotics/Lines/Etc:   · IV  · O2 Device: Room air  Treatment/Session Assessment:    · Response to Treatment:  See above. · Interdisciplinary Collaboration:   o Physical Therapist  o Registered Nurse  · After treatment position/precautions:   o Up in chair  o Bed/Chair-wheels locked  o Bed in low position  o Call light within reach  o RN notified  o Instucted patient to call for assistance back to bed; RN and PCT notified   · Compliance with Program/Exercises: Will assess as treatment progresses.   · Recommendations/Intent for next treatment session: \"Next visit will focus on advancements to more challenging activities and reduction in assistance provided\".     Total Treatment Duration:  PT Patient Time In/Time Out  Time In: 9686  Time Out: [address], [facility]

## 2017-10-31 NOTE — PROGRESS NOTES
ORTHO PROGRESS NOTE    2017    Admit Date: 10/30/2017  Admit Diagnosis: Lumbar spinal stenosis [M48.061]  Post Op day: 1 Day Post-Op      Subjective:     Tai Kang is a patient who is now 1 Day Post-Op  and has complaints  of back pain. .       Objective:     PT/OT: slow progress with PT        Vital Signs:    Patient Vitals for the past 8 hrs:   BP Temp Pulse Resp SpO2   10/31/17 1143 106/64 98.7 °F (37.1 °C) (!) 111 17 98 %   10/31/17 0805 97/53 99 °F (37.2 °C) (!) 119 - 100 %   10/31/17 0712 - - - - 98 %     Temp (24hrs), Av.6 °F (37 °C), Min:98.2 °F (36.8 °C), Max:99 °F (37.2 °C)      LAB:    No results for input(s): HGB, WBC, PLT, HGBEXT, PLTEXT in the last 72 hours. I/O:     10/29 1901 - 10/31 0700  In: 2317 [P.O.:30; I.V.:2287]  Out: 900 [Urine:800]    Physical Exam:    Awake and in no acute distress. Mood and affect appropriate. Respirations unlabored and no evidence cyanosis. Calves nontender. Abdomen soft and nontender. Dressing clean/dry  No new neurologic deficit.     Assessment:      Patient Active Problem List   Diagnosis Code    Chronic back pain M54.9, G89.29    Anxiety attack F41.0    Bipolar disorder, current episode mixed, moderate ([facility]) F31.62    Generalized anxiety disorder F41.1    Anger reaction R45.4    Asthma dependent on inhaled steroids J45.909, Z79.51    Chest pain on breathing R07.1    Cubital tunnel syndrome on left G56.22    Disorder of intervertebral disc M51.9    Spinal stenosis of lumbar region M48.061    Spinal stenosis M48.00       1 Day Post-Op STATUS POST Procedure(s):  L4-S1 LAMINECTOMY         Plan:     Continue PT/OT/Rehab  Discontinue:    Consult: none  Anticipate discharge to: HOME when cleared with PT, hopefully tomorrow       Signed By: Parish Macario NP

## 2017-10-31 NOTE — PROGRESS NOTES
Problem: Mobility Impaired (Adult and Pediatric)  Goal: *Acute Goals and Plan of Care (Insert Text)  Discharge Goals:  (1.)Ms. Zen Tse will perform bed mobility via log roll with INDEPENDENCE within 7 day(s). (2.)Ms. Zen Tse will transfer from bed to chair and chair to bed with MODIFIED INDEPENDENCE using the least restrictive device within 7 day(s). (3.)Ms. Zen Tse will ambulate with MODIFIED INDEPENDENCE for 300+ feet with the least restrictive device within 7 day(s). (4.)Ms. Zen Tse will demonstrate good dynamic standing balance utilizing least restrictive assistive device within 7 day(s). (5.)Ms. Zen Tse will verbalize and follow 3/3 spinal precautions 100% of the time with 0 verbal cues within 7 day(s). ________________________________________________________________________________________________      PHYSICAL THERAPY: Initial Assessment, Treatment Day: Day of Assessment, AM 10/31/2017  OBSERVATION: Hospital Day: 2  Payor: Dago Mullen / Plan: [address] / Product Type: PPO /      Spinal Precautions     NAME/AGE/GENDER: Ronny Miller is a 40 y.o. female   PRIMARY DIAGNOSIS: Lumbar spinal stenosis [M48.061] Spinal stenosis of lumbar region Spinal stenosis of lumbar region  Procedure(s) (LRB):  L4-S1 LAMINECTOMY    (N/A)  1 Day Post-Op  ICD-10: Treatment Diagnosis:   · Difficulty in walking, Not elsewhere classified (R26.2)  · Low Back Pain (M54.5)   Precaution/Allergies:  Tramadol      ASSESSMENT:     Ms. Zen Tse is a 40year old female 1 day s/p L4-S1 laminectomy with spinal precautions. Patient seen this AM for initial physical therapy evaluation: presents supine in bed and endorses 7/10 post operative low back pain as well as a migraine, recently medicated per RN/patient. Patient lives with her son in a single story residence with 1 steps to enter. At baseline, patient is independent with ADLs/IADLs, ambulates with a SPC PRN, drives, and works at a desk job.  Today, AYALA KELLY strength/ROM WFL, B LE strength functionally 4-/5 with 5/5 distal power, B LE ROM WFL, and sensation is intact to light touch L3-S1 B. Educated patient on spinal precautions, activity pacing/progression, and home safety; verbalized understanding. Patient performed bed mobility via log roll, transfers bed to chair, and ambulation 2x5ft with CGA. Demonstrated a slow, shuffled, step-to gait pattern with functional B LE and fair dynamic balance. Mobility and gait distance limited by pain this AM; anticipate patient will progress quickly toward stated goals with optimal pain control. Ms. Mendez Haines presents with decreased functional mobility and balance/gait status from baseline. Recommend continued skilled PT services to address stated deficits. Will follow with acute PT BID plan of care and progress toward stated goals during acute stay. This section established at most recent assessment   PROBLEM LIST (Impairments causing functional limitations):  1. Decreased Strength  2. Decreased ADL/Functional Activities  3. Decreased Transfer Abilities  4. Decreased Ambulation Ability/Technique  5. Decreased Balance  6. Increased Pain  7. Decreased Activity Tolerance  8. Decreased Knowledge of Precautions  9. Decreased [place] with Home Exercise Program   INTERVENTIONS PLANNED: (Benefits and precautions of physical therapy have been discussed with the patient.)  1. Balance Exercise  2. Bed Mobility  3. Family Education  4. Gait Training  5. Home Exercise Program (HEP)  6. Neuromuscular Re-education/Strengthening  7. Range of Motion (ROM)  8. Therapeutic Activites  9. Therapeutic Exercise/Strengthening  10. Transfer Training  11.  Group Therapy     TREATMENT PLAN: Frequency/Duration: 3 times a week for duration of hospital stay  Rehabilitation Potential For Stated Goals: Good     RECOMMENDED REHABILITATION/EQUIPMENT: (at time of discharge pending progress): Due to the probability of continued deficits (see above) this patient will likely need continued skilled physical therapy after discharge. Equipment:    None at this time              HISTORY:   History of Present Injury/Illness (Reason for Referral):  S/p L4-S1 laminectomy  Past Medical History/Comorbidities:   Ms. Matthew Fernandes  has a past medical history of Acid reflux; Anemia; Anxiety; Arthralgia of multiple sites; Asthma; Bipolar 1 disorder ([address] Chronic pain; Depressive disorder; Genital herpes; MRSA (methicillin resistant staph aureus) culture positive; and Spinal stenosis. She also has no past medical history of Adverse effect of anesthesia; Difficult intubation; Malignant hyperthermia due to anesthesia; Nausea & vomiting; or Pseudocholinesterase deficiency. Ms. Matthew Fernandes  has a past surgical history that includes tubal ligation. Social History/Living Environment:   Home Environment: Private residence  # Steps to Enter: 1  One/Two Story Residence: One story  Living Alone: No  Support Systems: Child(lakisha) (Son)  Patient Expects to be Discharged to[de-identified] Private residence  Current DME Used/Available at Home: Cane, straight  Prior Level of Function/Work/Activity:  Patient lives with her son in a single story residence with 1 steps to enter. At baseline, patient is independent with ADLs/IADLs, ambulates with a SPC PRN, drives, and works at a desk job. Number of Personal Factors/Comorbidities that affect the Plan of Care: 0: LOW COMPLEXITY   EXAMINATION:   Most Recent Physical Functioning:   Gross Assessment:  AROM: Within functional limits (B UE/LE)  Strength: Generally decreased, functional (4-/5 B LE functionally; 5/5 distal power)  Coordination: Within functional limits (B UE/LE)  Sensation: Intact (L3-S1 B)               Posture:  Posture (WDL): Exceptions to WDL  Posture Assessment:  Forward head  Balance:  Sitting: Impaired  Sitting - Static: Good (unsupported)  Sitting - Dynamic: Fair (occasional)  Standing: Impaired  Standing - Static: Fair  Standing - Dynamic : Fair Bed Mobility:  Rolling: Supervision; Additional time  Supine to Sit: Minimum assistance; Additional time  Scooting: Contact guard assistance; Additional time  Wheelchair Mobility:     Transfers:  Sit to Stand: Minimum assistance  Stand to Sit: Contact guard assistance  Bed to Chair: Contact guard assistance  Gait:     Base of Support: Narrowed; Center of gravity altered  Speed/Donna: Shuffled; Slow  Step Length: Right shortened;Left shortened  Gait Abnormalities: Decreased step clearance;Shuffling gait; Step to gait;Trunk sway increased  Distance (ft): 10 Feet (ft) (2x5ft)  Assistive Device:  (handhold)  Ambulation - Level of Assistance: Contact guard assistance  Interventions: Safety awareness training; Tactile cues; Verbal cues      Body Structures Involved:  1. Nerves  2. Bones  3. Joints  4. Muscles  5. Ligaments Body Functions Affected:  1. Neuromusculoskeletal  2. Movement Related Activities and Participation Affected:  1. Mobility  2. Self Care   Number of elements that affect the Plan of Care: 1-2: LOW COMPLEXITY   CLINICAL PRESENTATION:   Presentation: Stable and uncomplicated: LOW COMPLEXITY   CLINICAL DECISION MAKIN[address] [place] [address] AM-PAC 6 Clicks   Basic Mobility Inpatient Short Form  How much difficulty does the patient currently have. .. Unable A Lot A Little None   1. Turning over in bed (including adjusting bedclothes, sheets and blankets)? [] 1   [] 2   [] 3   [x] 4   2. Sitting down on and standing up from a chair with arms ( e.g., wheelchair, bedside commode, etc.)   [] 1   [] 2   [x] 3   [] 4   3. Moving from lying on back to sitting on the side of the bed? [] 1   [] 2   [x] 3   [] 4   How much help from another person does the patient currently need. .. Total A Lot A Little None   4. Moving to and from a bed to a chair (including a wheelchair)? [] 1   [] 2   [x] 3   [] 4   5. Need to walk in hospital room? [] 1   [] 2   [x] 3   [] 4   6. Climbing 3-5 steps with a railing?    [] 1 [] 2   [x] 3   [] 4   © 2007, Trustees of [facility] [employer], under license to [employer]. All rights reserved      Score:  Initial: 19 Most Recent: 19 (Date: 10/31/17 )    Interpretation of Tool:  Represents activities that are increasingly more difficult (i.e. Bed mobility, Transfers, Gait). Score 24 23 22-20 19-15 14-10 9-7 6     Modifier CH CI CJ CK CL CM CN      ? Mobility - Walking and Moving Around:     - CURRENT STATUS: CK - 40%-59% impaired, limited or restricted    - GOAL STATUS: CJ - 20%-39% impaired, limited or restricted    - D/C STATUS:  ---------------To be determined---------------  Payor: BLUE CROSS / Plan: [place] OF [address] [place] [address] Rd / Product Type: PPO /      Medical Necessity:     · Patient demonstrates good rehab potential due to higher previous functional level. Reason for Services/Other Comments:  · Patient continues to require skilled intervention due to decreased functional mobility and balance/gait status from baseline. .   Use of outcome tool(s) and clinical judgement create a POC that gives a: Clear prediction of patient's progress: LOW COMPLEXITY            TREATMENT:   (In addition to Assessment/Re-Assessment sessions the following treatments were rendered)   Pre-treatment Symptoms/Complaints:    Pain: Initial:   Pain Intensity 1: 8 (7/10 pre mobility/ 8/10 post mobility)  Pain Location 1: Back  Pain Orientation 1: Lower  Pain Intervention(s) 1: Ambulation/Increased Activity, Repositioned, Position, Emotional support  Post Session:  8/10 in straight back chair     Initial Evaluation  Therapeutic Activity: (    8 Minutes): Therapeutic activities including bed mobility, transfer training, balance training, safety awareness training, ambulation on level ground 2x5ft, and patient education to improve mobility, strength, balance and coordination. Required minimal Safety awareness training; Tactile cues; Verbal cues to promote static and dynamic balance in standing. Braces/Orthotics/Lines/Etc:   · IV  · O2 Device: Room air  Treatment/Session Assessment:    · Response to Treatment:  See above. · Interdisciplinary Collaboration:   o Physical Therapist  o Registered Nurse  · After treatment position/precautions:   o Up in chair  o Bed/Chair-wheels locked  o Bed in low position  o Call light within reach  o RN notified  o Instucted patient to call for assistance back to bed; RN and PCT notified   · Compliance with Program/Exercises: Will assess as treatment progresses. · Recommendations/Intent for next treatment session: \"Next visit will focus on advancements to more challenging activities and reduction in assistance provided\".     Total Treatment Duration:  PT Patient Time In/Time Out  Time In: 0910  Time Out: [address], [facility]

## 2017-11-01 VITALS
RESPIRATION RATE: 13 BRPM | HEART RATE: 101 BPM | BODY MASS INDEX: 25.76 KG/M2 | TEMPERATURE: 98.4 F | SYSTOLIC BLOOD PRESSURE: 116 MMHG | OXYGEN SATURATION: 100 % | WEIGHT: 164.13 LBS | HEIGHT: 67 IN | DIASTOLIC BLOOD PRESSURE: 76 MMHG

## 2017-11-01 PROCEDURE — 74011250637 HC RX REV CODE- 250/637: Performed by: ORTHOPAEDIC SURGERY

## 2017-11-01 PROCEDURE — 99218 HC RM OBSERVATION: CPT

## 2017-11-01 PROCEDURE — 94760 N-INVAS EAR/PLS OXIMETRY 1: CPT

## 2017-11-01 PROCEDURE — 74011000250 HC RX REV CODE- 250: Performed by: ORTHOPAEDIC SURGERY

## 2017-11-01 PROCEDURE — 74011250636 HC RX REV CODE- 250/636: Performed by: ORTHOPAEDIC SURGERY

## 2017-11-01 PROCEDURE — 94640 AIRWAY INHALATION TREATMENT: CPT

## 2017-11-01 PROCEDURE — 97530 THERAPEUTIC ACTIVITIES: CPT

## 2017-11-01 RX ADMIN — BUDESONIDE 500 MCG: 0.5 INHALANT RESPIRATORY (INHALATION) at 07:24

## 2017-11-01 RX ADMIN — FAMOTIDINE 20 MG: 20 TABLET, FILM COATED ORAL at 09:08

## 2017-11-01 RX ADMIN — HYDROCODONE BITARTRATE AND ACETAMINOPHEN 1 TABLET: 10; 325 TABLET ORAL at 11:38

## 2017-11-01 RX ADMIN — QUETIAPINE FUMARATE 100 MG: 100 TABLET, FILM COATED ORAL at 09:08

## 2017-11-01 RX ADMIN — MONTELUKAST SODIUM 10 MG: 10 TABLET, FILM COATED ORAL at 09:08

## 2017-11-01 RX ADMIN — Medication 10 ML: at 06:00

## 2017-11-01 RX ADMIN — ALBUTEROL SULFATE 2.5 MG: 2.5 SOLUTION RESPIRATORY (INHALATION) at 07:24

## 2017-11-01 RX ADMIN — HYDROCODONE BITARTRATE AND ACETAMINOPHEN 1 TABLET: 10; 325 TABLET ORAL at 05:01

## 2017-11-01 RX ADMIN — ONDANSETRON 4 MG: 2 INJECTION INTRAMUSCULAR; INTRAVENOUS at 05:06

## 2017-11-01 RX ADMIN — GABAPENTIN 600 MG: 300 CAPSULE ORAL at 09:08

## 2017-11-01 RX ADMIN — HYDROMORPHONE HYDROCHLORIDE 1 MG: 1 INJECTION, SOLUTION INTRAMUSCULAR; INTRAVENOUS; SUBCUTANEOUS at 09:35

## 2017-11-01 RX ADMIN — CYCLOBENZAPRINE HYDROCHLORIDE 5 MG: 10 TABLET, FILM COATED ORAL at 11:38

## 2017-11-01 NOTE — DISCHARGE INSTRUCTIONS
MAY shower NO tub bathing    LEAVE dressing on incision for 3 DAYS-->then may remove   (If dressing starts to fall off may re-secure with tape)    NO lifting anything heavier than 5LBS     NO Bending, Lifting or Twisting    NO driving until directed by your doctor    Avoid sitting more than 20 - 30 minutes at a time    DO NOT take any NSAIDS (either prescribed or over the counter until directed    (Aleve, Ibuprofen, Mobic, etc) as this will interfere with bone healing    CALL the doctor if:  Fever >100.5  ([phone])                 Incision becomes red, swollen or  opens up               Incision has yellow, thick drainage or an odor               Pain is not managed with prescribed medications               Excessive nausea and/or vomiting    Avoid having pets sleep in bed with you until incision is completely healed        DISCHARGE SUMMARY from Nurse    PATIENT INSTRUCTIONS:    After general anesthesia or intravenous sedation, for 24 hours or while taking prescription Narcotics:  · Limit your activities  · Do not drive and operate hazardous machinery  · Do not make important personal or business decisions  · Do  not drink alcoholic beverages  · If you have not urinated within 8 hours after discharge, please contact your surgeon on call. Report the following to your surgeon:  · Excessive pain, swelling, redness or odor of or around the surgical area  · Temperature over 100.5  · Nausea and vomiting lasting longer than 4 hours or if unable to take medications  · Any signs of decreased circulation or nerve impairment to extremity: change in color, persistent  numbness, tingling, coldness or increase pain  · Any questions    What to do at Home:  Recommended activity: see discharge instructions    If you experience any of the following symptoms see discharge instructions, please follow up with surgeon. *  Please give a list of your current medications to your Primary Care Provider.     *  Please update this list whenever your medications are discontinued, doses are      changed, or new medications (including over-the-counter products) are added. *  Please carry medication information at all times in case of emergency situations. These are general instructions for a healthy lifestyle:    No smoking/ No tobacco products/ Avoid exposure to second hand smoke  Surgeon General's Warning:  Quitting smoking now greatly reduces serious risk to your health. Obesity, smoking, and sedentary lifestyle greatly increases your risk for illness    A healthy diet, regular physical exercise & weight monitoring are important for maintaining a healthy lifestyle    You may be retaining fluid if you have a history of heart failure or if you experience any of the following symptoms:  Weight gain of 3 pounds or more overnight or 5 pounds in a week, increased swelling in our hands or feet or shortness of breath while lying flat in bed. Please call your doctor as soon as you notice any of these symptoms; do not wait until your next office visit. Recognize signs and symptoms of STROKE:    F-face looks uneven    A-arms unable to move or move unevenly    S-speech slurred or non-existent    T-time-call 911 as soon as signs and symptoms begin-DO NOT go       Back to bed or wait to see if you get better-TIME IS BRAIN. Warning Signs of HEART ATTACK     Call 911 if you have these symptoms:   Chest discomfort. Most heart attacks involve discomfort in the center of the chest that lasts more than a few minutes, or that goes away and comes back. It can feel like uncomfortable pressure, squeezing, fullness, or pain.  Discomfort in other areas of the upper body. Symptoms can include pain or discomfort in one or both arms, the back, neck, jaw, or stomach.  Shortness of breath with or without chest discomfort.  Other signs may include breaking out in a cold sweat, nausea, or lightheadedness.   Don't wait more than five minutes to call 911 - MINUTES MATTER! Fast action can save your life. Calling 911 is almost always the fastest way to get lifesaving treatment. Emergency Medical Services staff can begin treatment when they arrive -- up to an hour sooner than if someone gets to the hospital by car. The discharge information has been reviewed with the patient. The patient verbalized understanding. Discharge medications reviewed with the patient and appropriate educational materials and side effects teaching were provided.   ___________________________________________________________________________________________________________________________________

## 2017-11-01 NOTE — PHYSICIAN ADVISORY
Letter of Determination: Outpatient Status Appropriate  This patient was originally hospitalized as Outpatient receiving Observation Services on 10/30/2017 for scheduled lumbar laminectomy. This patient is appropriate for Outpatient Admission in accordance with CMS regulation Section 43 .3 and the Inpatient Only List. There does not appear to be sufficient medical necessity to warrant Observation services. It is our recommendation that this patient's hospitalization status should be OUTPATIENT status.      The final decision regarding the patient's hospitalization status depends on the attending physician's judgement.       Rajwinder Bowen MD, LINDA,   Physician [facility] [place].

## 2017-11-01 NOTE — PROGRESS NOTES
Discharge instructions and prescriptions given and explained to pt. Pt verbalized understanding. Medication side effects sheet reviewed with pt. Pt to be discharged home after getting a dose of pain medicine. Pt was asking about a refill on Flexeril for home, will call Oj Shook NP regarding this. Order to call in to pharmacy: Flexeril 10 mg TID PRN. Disp. 30 Refill 0.

## 2017-11-01 NOTE — PROGRESS NOTES
Problem: Mobility Impaired (Adult and Pediatric)  Goal: *Acute Goals and Plan of Care (Insert Text)  Discharge Goals:  (1.)Ms. Genia Garcia will perform bed mobility via log roll with INDEPENDENCE within 7 day(s). (2.)Ms. Genia Garcia will transfer from bed to chair and chair to bed with MODIFIED INDEPENDENCE using the least restrictive device within 7 day(s). (3.)Ms. Genia Garcia will ambulate with MODIFIED INDEPENDENCE for 300+ feet with the least restrictive device within 7 day(s). (4.)Ms. Genia Garcia will demonstrate good dynamic standing balance utilizing least restrictive assistive device within 7 day(s). (5.)Ms. Genia Garcia will verbalize and follow 3/3 spinal precautions 100% of the time with 0 verbal cues within 7 day(s). ________________________________________________________________________________________________      PHYSICAL THERAPY: Daily Note, Treatment Day: 1st, AM 11/1/2017  OBSERVATION: Hospital Day: 3  Payor: Benjamin Fernandez / Plan: [facility] / Product Type: PPO /      Spinal Precautions     NAME/AGE/GENDER: General Harmon is a 40 y.o. female   PRIMARY DIAGNOSIS: Lumbar spinal stenosis [M48.061] Spinal stenosis of lumbar region Spinal stenosis of lumbar region  Procedure(s) (LRB):  L4-S1 LAMINECTOMY    (N/A)  2 Days Post-Op  ICD-10: Treatment Diagnosis:   · Difficulty in walking, Not elsewhere classified (R26.2)  · Low Back Pain (M54.5)   Precaution/Allergies:  Tramadol      ASSESSMENT:     Ms. Genia Garcia is a 40year old female s/p L4-S1 laminectomy with spinal precautions. This morning, pt presents supine and is agreeable to therapy. Pt reports ambulating in room earlier and sitting up for 20 minutes. Pt performed supine to sit with SBA and additional time. Pt stood and was able to ambulate 180' with rolling walker and SBA. Pt ambulates with a short step length with slightly flexed posture, cues to correct. Reviewed spinal precautions. Pt returned supine with SBA.   Pt requires additional time with all mobility. Good progress. Will continue with POC. This section established at most recent assessment   PROBLEM LIST (Impairments causing functional limitations):  1. Decreased Strength  2. Decreased ADL/Functional Activities  3. Decreased Transfer Abilities  4. Decreased Ambulation Ability/Technique  5. Decreased Balance  6. Increased Pain  7. Decreased Activity Tolerance  8. Decreased Knowledge of Precautions  9. Decreased [place] with Home Exercise Program   INTERVENTIONS PLANNED: (Benefits and precautions of physical therapy have been discussed with the patient.)  1. Balance Exercise  2. Bed Mobility  3. Family Education  4. Gait Training  5. Home Exercise Program (HEP)  6. Neuromuscular Re-education/Strengthening  7. Range of Motion (ROM)  8. Therapeutic Activites  9. Therapeutic Exercise/Strengthening  10. Transfer Training  11. Group Therapy     TREATMENT PLAN: Frequency/Duration: 3 times a week for duration of hospital stay  Rehabilitation Potential For Stated Goals: Good     RECOMMENDED REHABILITATION/EQUIPMENT: (at time of discharge pending progress): Due to the probability of continued deficits (see above) this patient will likely need continued skilled physical therapy after discharge. Equipment:    None at this time              HISTORY:   History of Present Injury/Illness (Reason for Referral):  S/p L4-S1 laminectomy  Past Medical History/Comorbidities:   Ms. Thalia Gomez  has a past medical history of Acid reflux; Anemia; Anxiety; Arthralgia of multiple sites; Asthma; Bipolar 1 disorder ([address] Chronic pain; Depressive disorder; Genital herpes; MRSA (methicillin resistant staph aureus) culture positive; and Spinal stenosis. She also has no past medical history of Adverse effect of anesthesia; Difficult intubation; Malignant hyperthermia due to anesthesia; Nausea & vomiting; or Pseudocholinesterase deficiency.   Ms. Thalia Gomez  has a past surgical history that includes tubal ligation. Social History/Living Environment:   Home Environment: Private residence  # Steps to Enter: 1  One/Two Story Residence: One story  Living Alone: No  Support Systems: Child(lakisha) (Son)  Patient Expects to be Discharged to[de-identified] Private residence  Current DME Used/Available at Home: Cane, straight  Prior Level of Function/Work/Activity:  Patient lives with her son in a single story residence with 1 steps to enter. At baseline, patient is independent with ADLs/IADLs, ambulates with a SPC PRN, drives, and works at a desk job. Number of Personal Factors/Comorbidities that affect the Plan of Care: 0: LOW COMPLEXITY   EXAMINATION:   Most Recent Physical Functioning:   Gross Assessment:                  Posture:     Balance:  Sitting - Static: Good (unsupported)  Sitting - Dynamic: Good (unsupported)  Standing - Static: Fair  Standing - Dynamic : Fair Bed Mobility:  Supine to Sit: Stand-by asssistance; Additional time  Sit to Supine: Stand-by asssistance; Additional time  Wheelchair Mobility:     Transfers:  Sit to Stand: Stand-by asssistance; Additional time  Stand to Sit: Stand-by asssistance; Additional time  Gait:     Base of Support: Narrowed  Speed/Donna: Shuffled; Slow  Gait Abnormalities: Decreased step clearance;Shuffling gait  Distance (ft): 180 Feet (ft)  Assistive Device: Walker, rolling  Ambulation - Level of Assistance: Supervision  Interventions: Safety awareness training;Verbal cues      Body Structures Involved:  1. Nerves  2. Bones  3. Joints  4. Muscles  5. Ligaments Body Functions Affected:  1. Neuromusculoskeletal  2. Movement Related Activities and Participation Affected:  1. Mobility  2.  Self Care   Number of elements that affect the Plan of Care: 1-2: LOW COMPLEXITY   CLINICAL PRESENTATION:   Presentation: Stable and uncomplicated: LOW COMPLEXITY   CLINICAL DECISION MAKIN [facility] [address] [id number] AM-PAC 6 Clicks   Basic Mobility Inpatient Short Form  How much difficulty does the patient currently have. .. Unable A Lot A Little None   1. Turning over in bed (including adjusting bedclothes, sheets and blankets)? [] 1   [] 2   [] 3   [x] 4   2. Sitting down on and standing up from a chair with arms ( e.g., wheelchair, bedside commode, etc.)   [] 1   [] 2   [x] 3   [] 4   3. Moving from lying on back to sitting on the side of the bed? [] 1   [] 2   [x] 3   [] 4   How much help from another person does the patient currently need. .. Total A Lot A Little None   4. Moving to and from a bed to a chair (including a wheelchair)? [] 1   [] 2   [x] 3   [] 4   5. Need to walk in hospital room? [] 1   [] 2   [x] 3   [] 4   6. Climbing 3-5 steps with a railing? [] 1   [] 2   [x] 3   [] 4   © 2007, Trustees of [address] [place] [address] [address] [address], under license to [employer]. All rights reserved      Score:  Initial: 19 Most Recent: 19 (Date: 10/31/17 )    Interpretation of Tool:  Represents activities that are increasingly more difficult (i.e. Bed mobility, Transfers, Gait). Score 24 23 22-20 19-15 14-10 9-7 6     Modifier CH CI CJ CK CL CM CN      ? Mobility - Walking and Moving Around:     - CURRENT STATUS: CK - 40%-59% impaired, limited or restricted    - GOAL STATUS: CJ - 20%-39% impaired, limited or restricted    - D/C STATUS:  ---------------To be determined---------------  Payor: BLUE CROSS / Plan: [place] BLUE [employer] OF [address] / Product Type: PPO /      Medical Necessity:     · Patient demonstrates good rehab potential due to higher previous functional level. Reason for Services/Other Comments:  · Patient continues to require skilled intervention due to decreased functional mobility and balance/gait status from baseline. .   Use of outcome tool(s) and clinical judgement create a POC that gives a: Clear prediction of patient's progress: LOW COMPLEXITY            TREATMENT:      Pre-treatment Symptoms/Complaints:    Pain: Initial:      Post Session:  8/10 in straight back chair Therapeutic Activity: (    27 Minutes): Therapeutic activities including bed mobility, transfer training, balance training, safety awareness training, ambulation on level ground, and patient education to improve mobility, strength, balance and coordination. Required minimal Safety awareness training;Verbal cues to promote static and dynamic balance in standing. Braces/Orthotics/Lines/Etc:   · IV  · O2 Device: Room air  Treatment/Session Assessment:    · Response to Treatment:  See above. · Interdisciplinary Collaboration:   o Physical Therapy Assistant  o Registered Nurse  · After treatment position/precautions:   o Supine in bed  o Bed/Chair-wheels locked  o Bed in low position  o Call light within reach  o RN notified   · Compliance with Program/Exercises: Will assess as treatment progresses. · Recommendations/Intent for next treatment session: \"Next visit will focus on advancements to more challenging activities and reduction in assistance provided\".     Total Treatment Duration:  PT Patient Time In/Time Out  Time In: 0930  Time Out: 2500 Waqar Gallardo, PTA

## 2017-11-01 NOTE — DISCHARGE SUMMARY
Discharge Summary    Patient Radha Louise  [id number]  1975  40 y.o. Admit date: 10/30/2017    Discharge date:11/1/2017    Admitting Physician: Lore Boles MD    Discharge Physician: Lore Boles MD    Reason for Admission: spinal stenosis    Discharge Diagnoses:  Patient Active Problem List   Diagnosis Code    Chronic back pain M54.9, G89.29    Anxiety attack F41.0    Bipolar disorder, current episode mixed, moderate (HCC) F31.62    Generalized anxiety disorder F41.1    Anger reaction R45.4    Asthma dependent on inhaled steroids J45.909, Z79.51    Chest pain on breathing R07.1    Cubital tunnel syndrome on left G56.22    Disorder of intervertebral disc M51.9    Spinal stenosis of lumbar region M48.061    Spinal stenosis M48.00          Surgeon: Andrea Dunn III, MD    Procedure:  Bilateral total laminectomy of L4, L5, and S1 with   partial facetectomy and foraminotomy.         Post Op complications: none    HBG at Discharge: No results for input(s): HGB, HGBEXT in the last 72 hours. Indications for admission/procedure: Patient has had low back pain with radiation to the buttocks and lower extremities for an extended period of time. The symptoms and exam findings were felt to be consistent with neurogenic claudication. The preoperative radiographs and MRI/CT showed stenosis. Conservative measures have been exhausted. The symptoms progressed to the point where there is difficulty performing any task that requires prolonged standing or walking. In the outpatient setting the risks, benefits and potential complications of the above-listed procedure were discussed with her and an informed consent was obtained. Hospital Course: Patient admitted to ortho floor. Antibiotics were given postop. SCD and yana hose were in place for DVT prophylaxis. . Patient voided normally. Patient did not receive blood transfusion. Patient tolerated pain medications and po diet.  . Dressing remained clean, dry, and intact. Physical Therapy started on the day following surgery and progressed to independent ambulation, however patient required additional acute PT on POD #1. Patient remained neurologically stable throughout hospital course. Reports improvement of preoperative pain. At the time of discharge, had understanding of precautions needed following surgery. Discharged to: home    Condition: Stable:    New Medications: Norco    Follow up: 2 weeks      Discharge instructions:    -Resume pre hospital diet            -Resume home medications per medical continuation form     -Follow up in office as scheduled   -Call doctor immediately if T>100.5, increased pain, swelling, drainage.   -If shortness of breath or chest pain, immediately go to ER  -Post surgical instruction sheet given to patient    Signed:  Leeanne Davenport NP  11/1/2017

## 2017-11-01 NOTE — PROGRESS NOTES
ORTHO PROGRESS NOTE    2017    Admit Date: 10/30/2017  Admit Diagnosis: Lumbar spinal stenosis [M48.061]  Post Op day: 2 Days Post-Op      Subjective:     Erma Alpers is a patient who is now 2 Days Post-Op  and has no complaints. Objective:     PT/OT: satiosfactory. Vital Signs:    Patient Vitals for the past 8 hrs:   BP Temp Pulse Resp SpO2   17 0745 102/52 99 °F (37.2 °C) (!) 103 16 100 %   17 0724 - - - - 97 %   17 0417 103/57 99.8 °F (37.7 °C) (!) 108 20 100 %     Temp (24hrs), Av.8 °F (37.1 °C), Min:98 °F (36.7 °C), Max:99.8 °F (37.7 °C)      LAB:    No results for input(s): HGB, WBC, PLT, HGBEXT, PLTEXT in the last 72 hours. I/O:     10/30 1901 -  0700  In: 1087 [I.V.:1087]  Out: -     Physical Exam:    Awake and in no acute distress. Mood and affect appropriate. Respirations unlabored and no evidence cyanosis. Calves nontender. Abdomen soft and nontender. Dressing clean/dry  No new neurologic deficit. Assessment:      Patient Active Problem List   Diagnosis Code    Chronic back pain M54.9, G89.29    Anxiety attack F41.0    Bipolar disorder, current episode mixed, moderate (HCC) F31.62    Generalized anxiety disorder F41.1    Anger reaction R45.4    Asthma dependent on inhaled steroids J45.909, Z79.51    Chest pain on breathing R07.1    Cubital tunnel syndrome on left G56.22    Disorder of intervertebral disc M51.9    Spinal stenosis of lumbar region M48.061    Spinal stenosis M48.00       2 Days Post-Op STATUS POST Procedure(s):  L4-S1 LAMINECTOMY         Plan:     Continue PT/OT/Rehab  Discontinue: IV  Consult: none  Anticipate discharge to: HOME today, may stay for afternoon PT if she wants.        Signed By: Breana Escamilla NP

## 2017-12-13 PROBLEM — F51.05 INSOMNIA DUE TO MENTAL CONDITION: Status: ACTIVE | Noted: 2017-12-13

## 2017-12-28 ENCOUNTER — HOSPITAL ENCOUNTER (OUTPATIENT)
Dept: MRI IMAGING | Age: 42
Discharge: HOME | End: 2017-12-28
Attending: ORTHOPAEDIC SURGERY
Payer: COMMERCIAL

## 2017-12-28 DIAGNOSIS — M48.062 SPINAL STENOSIS OF LUMBAR REGION WITH NEUROGENIC CLAUDICATION: ICD-10-CM

## 2017-12-28 PROCEDURE — A9577 INJ MULTIHANCE: HCPCS | Performed by: ORTHOPAEDIC SURGERY

## 2017-12-28 PROCEDURE — 72158 MRI LUMBAR SPINE W/O & W/DYE: CPT

## 2017-12-28 PROCEDURE — 74011250636 HC RX REV CODE- 250/636: Performed by: ORTHOPAEDIC SURGERY

## 2017-12-28 RX ORDER — SODIUM CHLORIDE 0.9 % (FLUSH) 0.9 %
10 SYRINGE (ML) INJECTION
Status: COMPLETED | OUTPATIENT
Start: 2017-12-28 | End: 2017-12-28

## 2017-12-28 RX ADMIN — GADOBENATE DIMEGLUMINE 8 ML: 529 INJECTION, SOLUTION INTRAVENOUS at 14:53

## 2017-12-28 RX ADMIN — Medication 10 ML: at 14:53

## 2018-02-28 PROBLEM — E66.3 OVERWEIGHT (BMI 25.0-29.9): Status: ACTIVE | Noted: 2018-02-28

## 2018-05-08 PROBLEM — F51.5 NIGHTMARES: Status: ACTIVE | Noted: 2018-05-08

## 2018-07-06 PROBLEM — R19.5 DARK STOOLS: Status: ACTIVE | Noted: 2018-07-06

## 2018-07-06 PROBLEM — R19.8 ALTERNATING CONSTIPATION AND DIARRHEA: Status: ACTIVE | Noted: 2018-07-06

## 2018-10-05 ENCOUNTER — HOSPITAL ENCOUNTER (OUTPATIENT)
Dept: MAMMOGRAPHY | Age: 43
Discharge: HOME | End: 2018-10-05
Attending: OBSTETRICS & GYNECOLOGY
Payer: COMMERCIAL

## 2018-10-05 DIAGNOSIS — Z12.39 SCREENING FOR BREAST CANCER: ICD-10-CM

## 2018-10-05 PROCEDURE — 77067 SCR MAMMO BI INCL CAD: CPT

## 2018-10-06 NOTE — PROGRESS NOTES
BD3 indicates more dense breast tissue which can affect detection of early breast cancers. If pt desired FU breast MRI she can request this but would recommend checking with her insurance company first for coverage. Would also recommend getting 3D mammo next year. Breast density information should have been mailed to the pt already. Call with any questions.

## 2018-12-03 PROBLEM — M62.838 MUSCLE SPASM: Status: ACTIVE | Noted: 2018-12-03

## 2019-03-26 PROBLEM — F33.41 RECURRENT MAJOR DEPRESSIVE DISORDER, IN PARTIAL REMISSION (HCC): Status: ACTIVE | Noted: 2019-03-26

## 2019-03-26 PROBLEM — G43.109 MIGRAINE WITH AURA AND WITHOUT STATUS MIGRAINOSUS, NOT INTRACTABLE: Status: ACTIVE | Noted: 2019-03-26

## 2020-08-24 ENCOUNTER — HOSPITAL ENCOUNTER (OUTPATIENT)
Dept: CT IMAGING | Age: 45
Discharge: HOME | End: 2020-08-24
Attending: NURSE PRACTITIONER
Payer: COMMERCIAL

## 2020-08-24 DIAGNOSIS — R42 DIZZINESS: ICD-10-CM

## 2020-08-24 DIAGNOSIS — R47.81 SLURRED SPEECH: ICD-10-CM

## 2020-08-24 DIAGNOSIS — R51.9 NONINTRACTABLE HEADACHE, UNSPECIFIED CHRONICITY PATTERN, UNSPECIFIED HEADACHE TYPE: ICD-10-CM

## 2020-08-24 PROCEDURE — 70450 CT HEAD/BRAIN W/O DYE: CPT

## 2020-11-28 ENCOUNTER — HOSPITAL ENCOUNTER (OUTPATIENT)
Dept: MAMMOGRAPHY | Age: 45
Discharge: HOME | End: 2020-11-28
Attending: NURSE PRACTITIONER
Payer: COMMERCIAL

## 2020-11-28 DIAGNOSIS — Z12.39 BREAST SCREENING: ICD-10-CM

## 2020-11-28 PROCEDURE — 77067 SCR MAMMO BI INCL CAD: CPT

## 2020-12-02 NOTE — PROGRESS NOTES
IMPRESSION:  No mammographic evidence of malignancy.       BI-RADS Assessment Category 1: Negative.

## 2021-09-24 PROBLEM — F39 MOOD DISORDER (HCC): Status: RESOLVED | Noted: 2021-09-24 | Resolved: 2021-09-24

## 2021-09-24 PROBLEM — F39 MOOD DISORDER (HCC): Status: ACTIVE | Noted: 2021-09-24

## 2022-02-16 ENCOUNTER — TRANSCRIBE ORDER (OUTPATIENT)
Dept: SCHEDULING | Age: 47
End: 2022-02-16

## 2022-02-16 DIAGNOSIS — Z12.31 VISIT FOR SCREENING MAMMOGRAM: Primary | ICD-10-CM

## 2022-03-12 ENCOUNTER — HOSPITAL ENCOUNTER (OUTPATIENT)
Dept: MAMMOGRAPHY | Age: 47
Discharge: HOME | End: 2022-03-12
Attending: NURSE PRACTITIONER
Payer: COMMERCIAL

## 2022-03-12 DIAGNOSIS — Z12.31 VISIT FOR SCREENING MAMMOGRAM: ICD-10-CM

## 2022-03-12 PROCEDURE — 77067 SCR MAMMO BI INCL CAD: CPT

## 2022-03-16 NOTE — PROGRESS NOTES
IMPRESSION  No evidence of malignancy in either breast.     BI-RADS Assessment Category 1: Negative.  (#BRad1)

## 2022-03-18 PROBLEM — F33.41 RECURRENT MAJOR DEPRESSIVE DISORDER, IN PARTIAL REMISSION (HCC): Status: ACTIVE | Noted: 2019-03-26

## 2022-03-18 PROBLEM — R07.1 CHEST PAIN ON BREATHING: Status: ACTIVE | Noted: 2017-03-20

## 2022-03-18 PROBLEM — F51.5 NIGHTMARE: Status: ACTIVE | Noted: 2018-05-08

## 2022-03-18 PROBLEM — F41.0 ANXIETY ATTACK: Status: ACTIVE | Noted: 2017-05-23

## 2022-03-18 PROBLEM — M48.061 SPINAL STENOSIS OF LUMBAR REGION: Status: ACTIVE | Noted: 2017-03-20

## 2022-03-19 PROBLEM — Z79.51 ASTHMA DEPENDENT ON INHALED STEROIDS: Status: ACTIVE | Noted: 2017-03-20

## 2022-03-19 PROBLEM — M62.838 MUSCLE SPASM: Status: ACTIVE | Noted: 2018-12-03

## 2022-03-19 PROBLEM — J45.909 ASTHMA DEPENDENT ON INHALED STEROIDS: Status: ACTIVE | Noted: 2017-03-20

## 2022-03-19 PROBLEM — M48.00 SPINAL STENOSIS: Status: ACTIVE | Noted: 2017-10-30

## 2022-03-19 PROBLEM — E66.3 OVERWEIGHT (BMI 25.0-29.9): Status: ACTIVE | Noted: 2018-02-28

## 2022-03-19 PROBLEM — R45.4 ANGER REACTION: Status: ACTIVE | Noted: 2017-06-20

## 2022-03-19 PROBLEM — F31.62 BIPOLAR DISORDER, CURRENT EPISODE MIXED, MODERATE (HCC): Status: ACTIVE | Noted: 2017-06-20

## 2022-03-19 PROBLEM — R19.5 DARK STOOLS: Status: ACTIVE | Noted: 2018-07-06

## 2022-03-19 PROBLEM — R19.8 ALTERNATING CONSTIPATION AND DIARRHEA: Status: ACTIVE | Noted: 2018-07-06

## 2022-03-19 PROBLEM — M51.9 DISORDER OF INTERVERTEBRAL DISC: Status: ACTIVE | Noted: 2017-03-20

## 2022-03-19 PROBLEM — G43.109 MIGRAINE WITH AURA AND WITHOUT STATUS MIGRAINOSUS, NOT INTRACTABLE: Status: ACTIVE | Noted: 2019-03-26

## 2022-03-19 PROBLEM — F51.05 INSOMNIA DUE TO MENTAL CONDITION: Status: ACTIVE | Noted: 2017-12-13

## 2022-03-20 PROBLEM — F41.1 GENERALIZED ANXIETY DISORDER: Status: ACTIVE | Noted: 2017-06-20

## 2022-06-02 ENCOUNTER — TELEPHONE (OUTPATIENT)
Dept: BEHAVIORAL/MENTAL HEALTH CLINIC | Age: 47
End: 2022-06-02

## 2022-06-02 RX ORDER — ACYCLOVIR 400 MG/1
TABLET ORAL
Qty: 180 TABLET | Refills: 0 | Status: SHIPPED | OUTPATIENT
Start: 2022-06-02 | End: 2022-09-02

## 2022-06-02 NOTE — PLAN OF CARE
Katharine Adams  : 1975  Primary: Porter Paulino2  Secondary:  Julia Yeh [address] [address]  Phone: [phone]  Fax: [phone] Plan Frequency: 1x/week for 90 days    Plan of Care/Certification Expiration Date: 22      PT Visit Info: Total # of Visits Approved: 30  Total # of Visits to Date: 1  Progress Note Due Date: 22      OUTPATIENT PHYSICAL THERAPY:OP NOTE TYPE: Initial Assessment 6/3/2022  Episode  Appt Desk         Treatment Diagnosis:  Lack of coordination (muscle incoordination) (R27.8)  Pelvic floor dysfunction in female (M62.98)  Mixed incontinence (Urge and stress incontinence) (N39.46)  Hesitancy of micturition (R39.11)  Nocturia (R35.1)  Constipation, unspecified (K59.00)  * No diagnoses found *  Medical/Referring Diagnosis:  Urinary pain [R30.9]  Referring Physician:  Louise Condon MD MD Orders:  PT Eval and Treat  Return MD Appt: Unknown  Date of Onset:  No data recorded  2022  Allergies:  Tramadol  (Pt stated she takes this as needed for pain. ..)  Restrictions/Precautions:    Restrictions/Precautions: None  Required Braces or Orthoses?: No  No data recorded   Medications Last Reviewed:  6/3/2022     SUBJECTIVE   History of Injury/Illness (Reason for Referral):  Ms. Biju Loaiza is a 39 yo female referred to pelvic floor physical therapy (PFPT) by Louise Condon MD 2/ Urinary pain [R30.9] Pt reports that symptoms began 2022.     She re-injured her back at work in February, done with PT and had an injection and none of that helped  MRI didn't show any abnormalities  Heavy leakage since back injury, sometimes she can't stop the urinary flow, sometimes can't even feel that she's leaking  Does have small amounts of leakage at night, no complete bladder emptying at night while asleep    Pain: Flexeril and Tramadol    Sleep: wakes up at night due to pain and because of urinary urgency    7/10 at low back right now  At best it is 5/10  At worst it is 10/10    Urinary: Frequency 7x/day, >3x/night. Positive for stress urinary incontinence, urge urinary incontinence, urinary urgency, urinary hesitancy/intermittency and nocturia. Negative for incomplete emptying, dysuria, hematuria and nocturnal enuresis. Pt does use pads for urinary protection; 8-10 pads per day (PPD) (liners). Fluid intake: 16 oz water/day; bladder irritants include: soda and coffee. Pt does limit fluid intake due to bladder control. Bowel: Frequency once every 1-2 days. Positive for pain with bowel movement, pushing/straining with bowel movement, constipation and incomplete emptying. Negative for fecal incontinence. Pt does not use pads for bowel protection; 0 pads per day (PPD). [place] stool type: 1 and 2. Use of stool softeners or laxatives? No    Sexual: Pt is  sexually active with male partners. Negative for dyspareunia. History of sexual abuse: No    Pelvic Organ Prolapse/Pelvic Pain: Location: low back. Worse with sitting, standing, walking. Relieved with none. Max pain 10/10. Min pain 5/10. OB History: Number of pregnancies: 3. Number of vaginal deliveries: 2. Number of c-sections: 0. Patient Stated Goal(s):  \"to stop the leakage\"    Initial:     7/10 Post Session:     7/10  Past Medical History/Comorbidities:   Ms. Nasir Kitchen  has a past medical history of Acid reflux, Anemia, Anxiety, Arthralgia of multiple sites, Asthma, Bipolar 1 disorder ([facility] [address] Chronic pain, Depressive disorder, Genital herpes, MRSA (methicillin resistant staph aureus) culture positive, and Spinal stenosis. Ms. Nasir Kitchen  has a past surgical history that includes back surgery; lap,tubal cautery; and Tubal ligation.   Social History/Living Environment:   Lives With: Son; Other (comment) (Grandchild)     Prior Level of Function/Work/Activity:   Prior level of function: Spinal stenosis causing back pain (back surgery in 2017), dealing with back pain since then  Type of Occupation: paper work  No data [id number] recorded   Learning:   Does the patient/guardian have any barriers to learning?: No barriers  Will there be a co-learner?: No  What is the preferred language of the patient/guardian?: English  Is an  required?: No  How does the patient/guardian prefer to learn new concepts?: Listening; Reading; Demonstration; Pictures/Videos     Fall Risk Scale: Total Score: 0  Cronin Fall Risk: Low (0-24)     Personal Factors:        Sex:  female        Age:  40 y.o. OBJECTIVE   External Observations:   Voluntary contraction: [] absent     [x] present   Involuntary contraction: [] absent     [x] present   Involuntary relaxation: [x] absent     [] present   Perineal descent at rest: [x] absent     [] present   Perineal descent with bearing: [] absent     [x] present   Skin integrity: WNL   Postural assessment: Sacral sitting    Pelvic Floor Muscle (PFM) Assessment:   Vaginal vault size: [] decreased     [] increased     [x] WNL   Muscle volume: [] decreased     [x] WNL     [] Defect   PFM tension: [] decreased     [x] increased (LA)     [] WNL    Contraction ability:  Voluntary contraction: [] absent     [] weak     [x] moderate      [] strong  Voluntary relaxation: [] absent     [] partial     [x] complete   MMT: 3/5   PFM endurance: 2 seconds   Repetitions of endurance contractions: 10  Number of quick contractions in 10 seconds: 5  Quality of contractions: good    Tissue laxity test:  Anterior wall: [] minimum     [] moderate     [] severe      [x] WNL  Posterior wall: [] minimum     [] moderate     [] severe      [x] WNL    Palpation: via vaginal canal (non tender)  Superficial Pelvic Floor Musculature (PFM): Tender? Intermediate PFM Tender? Deep PFM Tender?    Superficial Transverse Perineal [] Right  [] Left  []DNT Deep Transverse Perineal [] Right  [] Left  []DNT Puborectalis [] Right  [] Left  []DNT Ischiocavernosus [] Right  [] Left  []DNT Compressor Urethra [] Right  [] Left  []DNT Pubococcygeus [] Right  [] Left  []DNT   Bulbocavernosus [] Right  [] Left  []DNT   Iliococcygeus [] Right  [] Left  []DNT       Obturator Internus [] Right  [] Left  []DNT       Coccygeus [] Right  [] Left  []DNT     Breath assessment:  Observation: good demonstration of diaphragmatic breathing   Coordination of pelvic floor muscles with breath: no pelvic floor muscle excursion   Co-contraction of PFM with transversus abdominis: difficulty coordinating TrA activation    ASSESSMENT   Initial Assessment: Sujit Verma presents with musculoskeletal limitations including pelvic floor muscle (PFM) tension, reduced PFM Range of Motion (ROM), poor posture, reduced coordination and awareness of PFM and decreased pelvic floor muscle (PFM) strength. These limitations are impairing the patient's ability to properly coordinate perineal elevation and descent for normalized PFM function, contributing to urinary dysfunction and bowel dysfunction. As a result, she is limited in her/his ability to participate in physical activities such as walking, swimming, or other exercise, entertainment activities such as going to a movie or concert, traveling by car or bus for a distance greater then 30 minutes away from home and social activities outside of the home. Problem List: (Impacting functional limitations): Body Structures, Functions, Activity Limitations Requiring Skilled Therapeutic Intervention: Decreased ROM; Decreased strength; Decreased endurance; Decreased coordination;  Increased pain; Decreased posture     Therapy Prognosis:   Therapy Prognosis: Good     Assessment Complexity:   Medium Complexity  PLAN   Effective Dates: 6/3/22 TO Plan of Care/Certification Expiration Date: 09/01/22     Frequency/Duration: Plan Frequency: 1x/week for 90 days     Interventions Planned (Treatment may consist of any combination of the following): Current Treatment Recommendations: Strengthening; ROM; Endurance training; Neuromuscular re-education; Manual Therapy - Soft Tissue Mobilization; Pain management; Home exercise program; Patient/Caregiver education & training; Modalities; Therapeutic activities     Goals: (Goals have been discussed and agreed upon with patient.)  Short-Term Functional Goals: Time Frame: 6 weeks  1. Patient will demonstrate understanding of and ability to teach back appropriate water and fiber intake, bladder irritants, toileting frequency, and positioning for improved self-management of symptoms. 2. Patient will demonstrate understanding of and ability to teach back two strategies to reduce constipation and improve toileting to minimize strain on and/or paradoxical movement of the pelvic floor muscles. 3. Patient will demonstrate independence with basic pelvic floor muscle (PFM) home exercises program (HEP) to improve awareness, coordination, and timing of PFM. 4. Patient will demonstrate ability to isolate a pelvic floor contraction without breath holding and minimal to no accessory muscle use in order to implement the knack and/or urge suppression, reducing pad usage by 50%. 5. Patient will demonstrate appropriate use of the pelvic floor muscle group (quick flicks and/or drops), without compensation, to implement urge suppression appropriately with urgency of urination and decrease the number of pads per day or UI episodes by 50%. 6. Patient will demonstrate ability to perform diaphragmatic breathing in multiple positions to assist in pelvic floor tension reduction. Discharge Goals: Time Frame: 12 weeks  1. Patient will demonstrate ability to voluntarily contract the pelvic floor muscles prior to a cough or sneeze for decreased leakage during increases in intra-abdominal pressure.    2. Patient will demonstrate independence with an advanced HEP for general conditioning, core stabilization, and mobility to facilitate carry

## 2022-06-02 NOTE — PROGRESS NOTES
Marcella Dhaval  : 1975  Primary: Porter Milian  Secondary:  Marko Rowan [address] [address]  Phone: [phone]  Fax: [phone] Plan Frequency: 1x/week for 90 days    Plan of Care/Certification Expiration Date: 22      PT Visit Info: Total # of Visits Approved: 30  Total # of Visits to Date: 1  Progress Note Due Date: 22      OUTPATIENT PHYSICAL THERAPY:OP NOTE TYPE: Treatment Note 6/3/2022  Episode  }Appt Desk        Treatment Diagnosis:  Lack of coordination (muscle incoordination) (R27.8)  Pelvic floor dysfunction in female (M62.98)  Mixed incontinence (Urge and stress incontinence) (N39.46)  Hesitancy of micturition (R39.11)  Nocturia (R35.1)  Constipation, unspecified (K59.00)  * No diagnoses found *  Medical/Referring Diagnosis:  Urinary pain [R30.9]  Referring Physician:  Tc Rivera MD MD Orders:  PT Eval and Treat  Return MD Appt: Unknown  Date of Onset:  No data recorded  2022  Allergies:   Tramadol (pt states she is taking this as needed for pain. ..)  Restrictions/Precautions:  Restrictions/Precautions: None  Required Braces or Orthoses?: No  No data recorded   Interventions Planned (Treatment may consist of any combination of the following):    Current Treatment Recommendations: Strengthening; ROM; Endurance training; Neuromuscular re-education; Manual Therapy - Soft Tissue Mobilization; Pain management; Home exercise program; Patient/Caregiver education & training; Modalities; Therapeutic activities     Subjective Comments:    Mixed UI, urinary hesitancy, constipation    Initial:}    7/10 Post Session:       7/10  Medications Last Reviewed:  6/3/2022  Updated Objective Findings:  See evaluation note from today     Treatment   THERAPEUTIC EXERCISE: (10 minutes): Exercises per grid below to improve mobility, strength and coordination.   Required minimal visual, verbal and tactile cues to promote proper body mechanics and promote proper body breathing techniques. Progressed resistance, range and repetitions as indicated. Date:  6/3/22 Date:   Date:     Activity/Exercise Parameters Parameters Parameters   PFM Activation 10 x 2 sec holds    5 x 5 QF                   THERAPEUTIC ACTIVITY: (10 minutes): Functional activity education regarding anatomy, pathology and role of pelvic floor muscle (PFM) function in relation to presenting symptoms and role of pelvic floor therapy in conservative treatment. and Instruction on coordinated pelvic floor and diaphragmatic breathing to improve kinesthetic awareness of pelvic muscle mobility and restore proper motor recruitment patterns with breathing, posture, and functional movement (e.g. appropriate lift/contraction with increased IAP such as a cough, laugh, sneeze to prevent incontinence as well as appropriate relaxation/drop to reduce pain with vaginal penetration). TA Educational Topic Notes Date Completed   Pathology/Anatomy/PFM Function Reviewed 6/3/22   Bladder health education     Urinary urge suppression     The knack     Voiding strategies     Nocturia tips     Bowel/Bladder log     Bowel health education     Constipation care     Diarrhea/Fecal leakage     Colonic massage     Toilet positioning     Defecation dynamics     Sources of fiber     Return to intercourse/Dilator training     Sexual positioning     Lubricants/vaginal moisturizers     Vaginal irritants     Body mechanics     Posture/ergonomics     Diaphragmatic breathing Practiced, discussed use with urinary urgency 6/3/22   Resources and technology       MANUAL THERAPY: (0 minutes): Soft tissue mobilization was utilized and necessary because of the patient's restricted motion of soft tissue.   Date Type Location Comments   6/3/2022 Internal assessment/treatment Via vaginal canal Assessment completed                                       (Used abbreviations: MET - muscle energy technique;

## 2022-06-02 NOTE — TELEPHONE ENCOUNTER
Please call patient about paperwork that was discussed last week with Real Griffin? I did not see a note in the chart.  Thanks

## 2022-06-03 ENCOUNTER — HOSPITAL ENCOUNTER (OUTPATIENT)
Dept: PHYSICAL THERAPY | Age: 47
Setting detail: RECURRING SERIES
Discharge: HOME | End: 2022-06-06
Payer: COMMERCIAL

## 2022-06-03 PROCEDURE — 97162 PT EVAL MOD COMPLEX 30 MIN: CPT

## 2022-06-03 PROCEDURE — 97110 THERAPEUTIC EXERCISES: CPT

## 2022-06-03 ASSESSMENT — PAIN SCALES - GENERAL: PAINLEVEL_OUTOF10: 7

## 2022-06-16 ENCOUNTER — TELEMEDICINE (OUTPATIENT)
Dept: BEHAVIORAL/MENTAL HEALTH CLINIC | Age: 47
End: 2022-06-16
Payer: COMMERCIAL

## 2022-06-16 DIAGNOSIS — F31.62 BIPOLAR DISORDER, CURRENT EPISODE MIXED, MODERATE (HCC): ICD-10-CM

## 2022-06-16 DIAGNOSIS — F51.5 NIGHTMARE: ICD-10-CM

## 2022-06-16 DIAGNOSIS — F51.05 INSOMNIA DUE TO MENTAL CONDITION: ICD-10-CM

## 2022-06-16 DIAGNOSIS — F41.1 GENERALIZED ANXIETY DISORDER: Primary | ICD-10-CM

## 2022-06-16 PROCEDURE — 99215 OFFICE O/P EST HI 40 MIN: CPT | Performed by: NURSE PRACTITIONER

## 2022-06-16 RX ORDER — QUETIAPINE FUMARATE 300 MG/1
300 TABLET, FILM COATED ORAL NIGHTLY
Qty: 90 TABLET | Refills: 1 | Status: SHIPPED | OUTPATIENT
Start: 2022-06-16 | End: 2022-09-14

## 2022-06-16 RX ORDER — ARIPIPRAZOLE 20 MG/1
20 TABLET ORAL DAILY
Qty: 30 TABLET | Refills: 1 | Status: SHIPPED | OUTPATIENT
Start: 2022-06-16 | End: 2022-09-30 | Stop reason: SDUPTHER

## 2022-06-16 RX ORDER — CLONAZEPAM 1 MG/1
1 TABLET ORAL 2 TIMES DAILY PRN
Qty: 60 TABLET | Refills: 2 | Status: SHIPPED | OUTPATIENT
Start: 2022-06-16 | End: 2022-09-30 | Stop reason: SDUPTHER

## 2022-06-16 NOTE — PROGRESS NOTES
OUTPATIENT PSYCHIATRIC RETURN VISIT PROGRESS NOTE    I was in the office While conducting this encounter. She and/ or her healthcare decision maker is aware that this patient-initialed Telehealth encounter is a billable service with coverage as determined by her insurance carrier. She is aware that she may receive a bill and has provided verbal consent to proceed: Yes    The Virtual visit was conducted via bettercodes[employer]org. Pursuant to the emergency declaration under the [facility], [facility] waiver authority and the [employer] Act, this Virtual  Visit was conducted to reduce the patient's risk of exposure to COVID-19 and provide continuity of care for an established patient. Services were provided through a video synchronous discussion virtually to substitute for in-person clinic visit. Due to this being a TeleHealth evaluation, many elements of the physical examination are unable to be assessed. Total Time:  45 minutes        Date of Service: 6/16/2022     Identification    Amie Jansen is a 40 y.o.  with a past psychiatric history of bipolar d/o, VICTOR M, insomnia  who presents today for a psychiatric follow up appointment. CC:  Routine medication management follow up. No chief complaint on file. Subjective / Interval History:    Pt comes to clinic today and reports that mood has stabilized with current medication regimen. She increased her Abilify to 20 mg daily and says her mood is now much more stable so we will continue at the increased dose and further reduce the quetiapine to 150 mg at HS. Pt has been medication compliant and denies any side-effects. Pt denies SI, HI and AVH. Does not endorse any manic or psychotic symptoms. Psychiatric Review Of Systems:  Sleep: generally restful  sleep.   Appetite: ok  Current suicidal/homicidal ideations: Denies SI/ HI  Current auditory/visual hallucinations: Denies Medications:    Current Outpatient Medications:     acyclovir (ZOVIRAX) 400 MG tablet, TAKE 1 TO 2 TABLETS BY MOUTH EVERY DAY, Disp: 180 tablet, Rfl: 0    albuterol sulfate  (90 Base) MCG/ACT inhaler, Inhale 2 puffs into the lungs every 4 hours as needed, Disp: , Rfl:     ARIPiprazole (ABILIFY) 10 MG tablet, Take 10 mg by mouth daily, Disp: , Rfl:     aspirin 325 MG tablet, Take 325 mg by mouth every 6 hours as needed, Disp: , Rfl:     clonazePAM (KLONOPIN) 1 MG tablet, Take 1 mg by mouth 2 times daily as needed. , Disp: , Rfl:     cyclobenzaprine (FLEXERIL) 5 MG tablet, Take 5-10 mg by mouth 2 times daily as needed, Disp: , Rfl:     QUEtiapine (SEROQUEL) 300 MG tablet, Take 300 mg by mouth, Disp: , Rfl:     traMADol (ULTRAM) 50 MG tablet, Take 50 mg by mouth every 6 hours as needed. , Disp: , Rfl:        PMH:  Past Medical History:   Diagnosis Date    Acid reflux     Anemia     Anxiety     Arthralgia of multiple sites     Asthma     daily inhaler    Bipolar 1 disorder (HCC)     Chronic pain     lower back    Depressive disorder     Genital herpes     MRSA (methicillin resistant staph aureus) culture positive     on skin of abd-- 2015--- was tx---pt states just staph not MRSA    Spinal stenosis        Vitals: There were no vitals filed for this visit.     ROS:  Psychological ROS: negative for depression    Mental Status Exam:   Appearance  Appears stated age, Well-kept, Appropriately attired, Well-nourished, Cooperative and Maintains eye-contact  Behavior  Cooperative and Pleasant  Psychomotor Activity within normal limits  Gait and Station Normal Shahla and Station and Normal Balance  Speech   appropriate and spontaneous  Mood    is euthymic  Affect    calm  Thought Process Goal-Directed and Circumstantial  Thought Content/Perceptual Disturbances free of delusions, free of hallucinations and not internally preoccupied  Cognition/Sensorium Alert, Fully oriented, Normal concentration/ Attention, Recent memory intact, Remote memory intact and Fund of knowledge adequate for level of education  Insight  good  Judgment good  Assessment:    Diagnoses and all orders for this visit:    Generalized anxiety disorder  -     clonazePAM (KLONOPIN) 1 MG tablet; Take 1 tablet by mouth 2 times daily as needed for Anxiety for up to 30 days. Insomnia due to mental condition    Bipolar disorder, current episode mixed, moderate (HCC)    Nightmare    Other orders  -     ARIPiprazole (ABILIFY) 20 MG tablet; Take 1 tablet by mouth daily  -     QUEtiapine (SEROQUEL) 300 MG tablet; Take 1 tablet by mouth nightly        Patient Education and Counseling:  Supportive therapy provided for identified psychosocial stressors. Medication education provided and decisions regarding medication regimen discussed with patient. Plan:  -  Continue prn Klonopin. Increase Abilify to 20 mg daily. Decrease quetiapine to 150 mg HS.  -  Crisis plan reviewed and patient verbally contracts for safety. Go to ED with emergent symptoms or safety concerns.  -  Risks, benefits, side effects of medications, including any / all black box warnings, discussed with patient, who verbalizes their understanding.     - [place] web site checked for controlled substances. Disposition:  home    Follow Up:  Return in 3 months, or call in the interim for any side-effects, decompensation, questions, or problems between now and the next visit. Return in about 3 months (around 9/16/2022). 45 minutes face to face with the patient with more than 50% of the total time spent on education, counseling, & coordination of care of the patient regarding ongoing psychiatric illness.         [address] [facility] [address]

## 2022-06-28 ENCOUNTER — NURSE TRIAGE (OUTPATIENT)
Dept: OTHER | Facility: CLINIC | Age: 47
End: 2022-06-28

## 2022-06-28 NOTE — TELEPHONE ENCOUNTER
Received call from Trice Lauren at [facility] with [employer]. Subjective: Caller states \"I am having pain and tingling in both legs\"     Current Symptoms: Back issues. Nerve test and all was normal. Shallow breathing. Onset: several months ago; worsening    Associated Symptoms: NA    Pain Severity: 8/10; sharp, tingling; intermittent    Temperature: denies fever     What has been tried: used nebulizer 1 hour ago. LMP: NA Pregnant: No    Recommended disposition: Go to ED/UCC Now (Or to Office with PCP Approval)    Care advice provided, patient verbalizes understanding; denies any other questions or concerns; instructed to call back for any new or worsening symptoms. Writer provided warm transfer to Suresh Justice at [facility] for second level triage     Attention Provider: Thank you for allowing me to participate in the care of your patient. The patient was connected to triage in response to information provided to the ECC/PSC. Please do not respond through this encounter as the response is not directed to a shared pool.     Reason for Disposition   Patient sounds very sick or weak to the triager    Protocols used: NEUROLOGIC DEFICIT-ADULT-[place]

## 2022-06-29 ENCOUNTER — OFFICE VISIT (OUTPATIENT)
Dept: FAMILY MEDICINE CLINIC | Facility: CLINIC | Age: 47
End: 2022-06-29
Payer: COMMERCIAL

## 2022-06-29 VITALS
BODY MASS INDEX: 27.43 KG/M2 | SYSTOLIC BLOOD PRESSURE: 124 MMHG | DIASTOLIC BLOOD PRESSURE: 75 MMHG | WEIGHT: 181 LBS | OXYGEN SATURATION: 99 % | HEIGHT: 68 IN | HEART RATE: 95 BPM

## 2022-06-29 DIAGNOSIS — F33.41 RECURRENT MAJOR DEPRESSIVE DISORDER, IN PARTIAL REMISSION (HCC): ICD-10-CM

## 2022-06-29 DIAGNOSIS — F31.62 BIPOLAR DISORDER, CURRENT EPISODE MIXED, MODERATE (HCC): ICD-10-CM

## 2022-06-29 DIAGNOSIS — M25.50 MULTIPLE JOINT PAIN: Primary | ICD-10-CM

## 2022-06-29 DIAGNOSIS — G89.29 CHRONIC RIGHT-SIDED LOW BACK PAIN WITH BILATERAL SCIATICA: ICD-10-CM

## 2022-06-29 DIAGNOSIS — Z79.899 MEDICATION MANAGEMENT: ICD-10-CM

## 2022-06-29 DIAGNOSIS — G25.81 RESTLESS LEGS: ICD-10-CM

## 2022-06-29 DIAGNOSIS — F41.0 ANXIETY ATTACK: ICD-10-CM

## 2022-06-29 DIAGNOSIS — K92.1 BLACK STOOLS: ICD-10-CM

## 2022-06-29 DIAGNOSIS — M54.42 CHRONIC RIGHT-SIDED LOW BACK PAIN WITH BILATERAL SCIATICA: ICD-10-CM

## 2022-06-29 DIAGNOSIS — M54.41 CHRONIC RIGHT-SIDED LOW BACK PAIN WITH BILATERAL SCIATICA: ICD-10-CM

## 2022-06-29 DIAGNOSIS — N92.0 MENORRHAGIA WITH REGULAR CYCLE: ICD-10-CM

## 2022-06-29 PROBLEM — J31.0 CHRONIC RHINITIS: Status: ACTIVE | Noted: 2022-06-29

## 2022-06-29 PROBLEM — R05.9 COUGH: Status: ACTIVE | Noted: 2022-06-29

## 2022-06-29 PROBLEM — J45.40 MODERATE PERSISTENT ASTHMA WITHOUT COMPLICATION: Status: ACTIVE | Noted: 2022-06-29

## 2022-06-29 PROBLEM — J01.90 ACUTE SINUSITIS: Status: ACTIVE | Noted: 2022-06-29

## 2022-06-29 LAB
ALBUMIN SERPL-MCNC: 3.8 G/DL (ref 3.5–5)
ALBUMIN/GLOB SERPL: 1.1 {RATIO} (ref 1.2–3.5)
ALP SERPL-CCNC: 57 U/L (ref 50–136)
ALT SERPL-CCNC: 29 U/L (ref 12–65)
ANION GAP SERPL CALC-SCNC: 6 MMOL/L (ref 7–16)
AST SERPL-CCNC: 20 U/L (ref 15–37)
BASOPHILS # BLD: 0.1 K/UL (ref 0–0.2)
BASOPHILS NFR BLD: 1 % (ref 0–2)
BILIRUB SERPL-MCNC: 0.3 MG/DL (ref 0.2–1.1)
BUN SERPL-MCNC: 7 MG/DL (ref 6–23)
CALCIUM SERPL-MCNC: 9 MG/DL (ref 8.3–10.4)
CHLORIDE SERPL-SCNC: 109 MMOL/L (ref 98–107)
CO2 SERPL-SCNC: 26 MMOL/L (ref 21–32)
CREAT SERPL-MCNC: 1 MG/DL (ref 0.6–1)
CRP SERPL-MCNC: <0.3 MG/DL (ref 0–0.9)
DIFFERENTIAL METHOD BLD: ABNORMAL
EOSINOPHIL # BLD: 0 K/UL (ref 0–0.8)
EOSINOPHIL NFR BLD: 0 % (ref 0.5–7.8)
ERYTHROCYTE [DISTWIDTH] IN BLOOD BY AUTOMATED COUNT: 14.6 % (ref 11.9–14.6)
ERYTHROCYTE [SEDIMENTATION RATE] IN BLOOD: 2 MM/HR (ref 0–20)
FERRITIN SERPL-MCNC: 8 NG/ML (ref 8–388)
GLOBULIN SER CALC-MCNC: 3.5 G/DL (ref 2.3–3.5)
GLUCOSE SERPL-MCNC: 77 MG/DL (ref 65–100)
HCT VFR BLD AUTO: 40.2 % (ref 35.8–46.3)
HGB BLD-MCNC: 11.9 G/DL (ref 11.7–15.4)
IMM GRANULOCYTES # BLD AUTO: 0 K/UL (ref 0–0.5)
IMM GRANULOCYTES NFR BLD AUTO: 1 % (ref 0–5)
LYMPHOCYTES # BLD: 2.3 K/UL (ref 0.5–4.6)
LYMPHOCYTES NFR BLD: 26 % (ref 13–44)
MCH RBC QN AUTO: 24 PG (ref 26.1–32.9)
MCHC RBC AUTO-ENTMCNC: 29.6 G/DL (ref 31.4–35)
MCV RBC AUTO: 81 FL (ref 79.6–97.8)
MONOCYTES # BLD: 0.8 K/UL (ref 0.1–1.3)
MONOCYTES NFR BLD: 9 % (ref 4–12)
NEUTS SEG # BLD: 5.6 K/UL (ref 1.7–8.2)
NEUTS SEG NFR BLD: 64 % (ref 43–78)
NRBC # BLD: 0 K/UL (ref 0–0.2)
PLATELET # BLD AUTO: 214 K/UL (ref 150–450)
PMV BLD AUTO: 13.2 FL (ref 9.4–12.3)
POTASSIUM SERPL-SCNC: 3.9 MMOL/L (ref 3.5–5.1)
PROT SERPL-MCNC: 7.3 G/DL (ref 6.3–8.2)
RBC # BLD AUTO: 4.96 M/UL (ref 4.05–5.2)
SODIUM SERPL-SCNC: 141 MMOL/L (ref 136–145)
WBC # BLD AUTO: 8.8 K/UL (ref 4.3–11.1)

## 2022-06-29 PROCEDURE — 99214 OFFICE O/P EST MOD 30 MIN: CPT | Performed by: NURSE PRACTITIONER

## 2022-06-29 RX ORDER — FLUTICASONE PROPIONATE AND SALMETEROL 250; 50 UG/1; UG/1
1 POWDER RESPIRATORY (INHALATION) 2 TIMES DAILY
COMMUNITY
Start: 2017-07-12

## 2022-06-29 RX ORDER — FLUTICASONE PROPIONATE 50 MCG
SPRAY, SUSPENSION (ML) NASAL
COMMUNITY

## 2022-06-29 RX ORDER — LORATADINE 10 MG/1
CAPSULE, LIQUID FILLED ORAL
COMMUNITY

## 2022-06-29 RX ORDER — HYDROCODONE BITARTRATE AND ACETAMINOPHEN 5; 325 MG/1; MG/1
1 TABLET ORAL EVERY 6 HOURS PRN
COMMUNITY

## 2022-06-29 RX ORDER — CYCLOBENZAPRINE HCL 10 MG
TABLET ORAL
COMMUNITY
Start: 2022-03-24 | End: 2022-06-29

## 2022-06-29 RX ORDER — TIZANIDINE 4 MG/1
4 TABLET ORAL EVERY 6 HOURS PRN
COMMUNITY

## 2022-06-29 ASSESSMENT — ENCOUNTER SYMPTOMS
NAUSEA: 1
EYES NEGATIVE: 1
RESPIRATORY NEGATIVE: 1
BACK PAIN: 1

## 2022-06-29 ASSESSMENT — PATIENT HEALTH QUESTIONNAIRE - PHQ9
SUM OF ALL RESPONSES TO PHQ QUESTIONS 1-9: 2
SUM OF ALL RESPONSES TO PHQ9 QUESTIONS 1 & 2: 2
SUM OF ALL RESPONSES TO PHQ QUESTIONS 1-9: 2
1. LITTLE INTEREST OR PLEASURE IN DOING THINGS: 1
2. FEELING DOWN, DEPRESSED OR HOPELESS: 1

## 2022-06-29 NOTE — PROGRESS NOTES
GERMANIA Jarvis is a 40 y.o.  female presenting for :  Chief Complaint   Patient presents with    Leg Pain     aches       Current Outpatient Medications   Medication Sig Dispense Refill    tiZANidine (ZANAFLEX) 4 MG tablet Take 4 mg by mouth every 6 hours as needed      HYDROcodone-acetaminophen (NORCO) 5-325 MG per tablet Take 1 tablet by mouth every 6 hours as needed for Pain.  fluticasone-salmeterol (ADVAIR DISKUS) 250-50 MCG/ACT AEPB diskus inhaler Inhale 1 puff into the lungs 2 times daily      clonazePAM (KLONOPIN) 1 MG tablet Take 1 tablet by mouth 2 times daily as needed for Anxiety for up to 30 days. 60 tablet 2    ARIPiprazole (ABILIFY) 20 MG tablet Take 1 tablet by mouth daily 30 tablet 1    QUEtiapine (SEROQUEL) 300 MG tablet Take 1 tablet by mouth nightly (Patient taking differently: Take 300 mg by mouth nightly 1/2 tablet 150 mg at bedtime.) 90 tablet 1    acyclovir (ZOVIRAX) 400 MG tablet TAKE 1 TO 2 TABLETS BY MOUTH EVERY  tablet 0    albuterol sulfate  (90 Base) MCG/ACT inhaler Inhale 2 puffs into the lungs every 4 hours as needed      aspirin 325 MG tablet Take 325 mg by mouth every 6 hours as needed      loratadine (CLARITIN) 10 MG capsule 1 tablet      fluticasone (FLONASE) 50 MCG/ACT nasal spray        No current facility-administered medications for this visit.         Allergies   Allergen Reactions    Tramadol Rash       Past Medical History:   Diagnosis Date    Acid reflux     Anemia     Anxiety     Arthralgia of multiple sites     Asthma     daily inhaler    Bipolar 1 disorder (HCC)     Chronic pain     lower back    Depressive disorder     Genital herpes     MRSA (methicillin resistant staph aureus) culture positive     on skin of abd-- 2015--- was tx---pt states just staph not MRSA    Spinal stenosis        Past Surgical History:   Procedure Laterality Date    BACK SURGERY      2017    LAP,TUBAL CAUTERY      TUBAL LIGATION Family History   Problem Relation Age of Onset    Diabetes Sister     Hypertension Sister     Cancer Father     Colon Cancer Father     Hypertension Mother         Social History     Socioeconomic History    Marital status: Single     Spouse name: Not on file    Number of children: Not on file    Years of education: Not on file    Highest education level: Not on file   Occupational History    Not on file   Tobacco Use    Smoking status: Never Smoker    Smokeless tobacco: Never Used   Substance and Sexual Activity    Alcohol use: Yes    Drug use: No    Sexual activity: Not on file     Comment: tubal ligation   Other Topics Concern    Not on file   Social History Narrative    Not on file     Social Determinants of Health     Financial Resource Strain:     Difficulty of Paying Living Expenses: Not on file   Food Insecurity:     Worried About Running Out of Food in the Last Year: Not on file    Cassidy of Food in the Last Year: Not on file   Transportation Needs:     Lack of Transportation (Medical): Not on file    Lack of Transportation (Non-Medical):  Not on file   Physical Activity:     Days of Exercise per Week: Not on file    Minutes of Exercise per Session: Not on file   Stress:     Feeling of Stress : Not on file   Social Connections:     Frequency of Communication with Friends and Family: Not on file    Frequency of Social Gatherings with Friends and Family: Not on file    Attends Roman Catholic Services: Not on file    Active Member of Clubs or Organizations: Not on file    Attends Club or Organization Meetings: Not on file    Marital Status: Not on file   Intimate Partner Violence:     Fear of Current or Ex-Partner: Not on file    Emotionally Abused: Not on file    Physically Abused: Not on file    Sexually Abused: Not on file   Housing Stability:     Unable to Pay for Housing in the Last Year: Not on file    Number of Places Lived in the Last Year: Not on file    Unstable Housing in the Last Year: Not on file     Pt presents new to me today for acute visit for leg pain. She is established with Lawanda Campbell NP. Patient has hx of asthma, bipolar disorder, and VICTOR M. Pt was evaluated by Dr. Keri Saint with ortho 05/23/22 for chronic bilateral low back pain with bilateral sciatica. She has history of prior L4-5 and L5-S1 decompression. Per surgical notes, her contrasted MRI does not show significant stenosis, but there is significant DDD. No surgical intervention at this time and recommended pain management. Pt reports she follows pain management, but she is concerned something more is going on. She reports typically her pain in her legs occur at night and now her pain is occurring daily throughout the day. She has to take rest breaks at work about every 20 minutes to stand. She does not work outside at [employer], she has a desk job. She denies any tick bites or exposures. She reports hand swelling, pain, numbness, and tingling in hands for over a year. It is noted she had previous arthritis biomarkers checked back in 2019 that were negative. Hand pain and swelling for over a year. Numbness and tingling in hands. No nerve conduction studies in hands. Pt reports hx of gout 10 years ago. No family hx of RA or lupus or neurological issues. Subjective: Caller states \"I am having pain and tingling in both legs\"      Current Symptoms: Back issues. Nerve test and all was normal. Shallow breathing.      Onset: several months ago; worsening     Associated Symptoms: NA     Pain Severity: 8/10; sharp, tingling; intermittent     Temperature: denies fever      What has been tried: used nebulizer 1 hour ago.      LMP: NA Pregnant: No    Review of Systems   Constitutional: Positive for chills. Negative for fever. Eyes: Negative. Respiratory: Negative. Reports asthma stable with nebulizer. Gastrointestinal: Positive for nausea.         Black stools at times, hx of ulcer   Endocrine: Negative for cold intolerance and heat intolerance. Musculoskeletal: Positive for arthralgias, back pain and joint swelling. Negative for neck pain. Right side lower back hurts. No upper body pain, shoulder, or neck. Sometimes left elbow hurts. Skin: Negative for rash. Neurological: Positive for dizziness, weakness, numbness and headaches. Negative for tremors. Legs feel heavy at times. Tingling at times. Hematological: Negative for adenopathy. Bruises/bleeds easily. Psychiatric/Behavioral: Positive for dysphoric mood. Negative for self-injury. The patient is nervous/anxious. PE    /75   Pulse 95   Ht 5' 7.5\" (1.715 m)   Wt 181 lb (82.1 kg)   SpO2 99%   BMI 27.93 kg/m²     Physical Exam  Constitutional:       General: She is not in acute distress. Appearance: Normal appearance. She is normal weight. She is not ill-appearing, toxic-appearing or diaphoretic. HENT:      Head: Normocephalic and atraumatic. Right Ear: External ear normal.      Left Ear: External ear normal.   Eyes:      Extraocular Movements: Extraocular movements intact. Conjunctiva/sclera: Conjunctivae normal.      Pupils: Pupils are equal, round, and reactive to light. Cardiovascular:      Rate and Rhythm: Normal rate and regular rhythm. Pulses: Normal pulses. Heart sounds: Normal heart sounds. Pulmonary:      Effort: Pulmonary effort is normal.      Breath sounds: Normal breath sounds. Abdominal:      General: There is no distension. Musculoskeletal:         General: Swelling present. No tenderness, deformity or signs of injury. Normal range of motion. Cervical back: Normal range of motion and neck supple. Right lower leg: No edema. Left lower leg: No edema. Comments: Right ring finger with joint swelling/previous fracture. Skin:     General: Skin is warm and dry. Coloration: Skin is not jaundiced or pale.    Neurological: General: No focal deficit present. Mental Status: She is alert and oriented to person, place, and time. Motor: No weakness. Gait: Gait normal.   Psychiatric:         Mood and Affect: Mood normal.         Behavior: Behavior normal.         Thought Content: Thought content normal.         Judgment: Judgment normal.          A/P    Gurpreet Mendoza was seen today for leg pain. Diagnoses and all orders for this visit:    Multiple joint pain  -     Rheumatoid Factor; Future  -     ANTELMO, Direct, w/Reflex; Future  -     Sedimentation Rate; Future  -     C-Reactive Protein; Future  -     Comprehensive Metabolic Panel; Future  -     CBC with Auto Differential; Future  -     CBC with Auto Differential  -     Comprehensive Metabolic Panel  -     C-Reactive Protein  -     Sedimentation Rate  -     ANTELMO, Direct, w/Reflex  -     Rheumatoid Factor    Medication management  -     Comprehensive Metabolic Panel; Future  -     CBC with Auto Differential; Future  -     CBC with Auto Differential  -     Comprehensive Metabolic Panel    Bipolar disorder, current episode mixed, moderate (HCC)  -     Comprehensive Metabolic Panel; Future  -     Comprehensive Metabolic Panel    Recurrent major depressive disorder, in partial remission (HCC)    Anxiety attack    Chronic right-sided low back pain with bilateral sciatica  -     Comprehensive Metabolic Panel; Future  -     CBC with Auto Differential; Future  -     CBC with Auto Differential  -     Comprehensive Metabolic Panel    Menorrhagia with regular cycle  -     CBC with Auto Differential; Future  -     Ferritin; Future  -     Ferritin  -     CBC with Auto Differential    Restless legs  -     CBC with Auto Differential; Future  -     Ferritin; Future  -     Ferritin  -     CBC with Auto Differential    Black stools  -     CBC with Auto Differential; Future        Discussed with patient recommend continued follow-up with pain management.   May consider rheumatology referral if her arthritis studies come back positive. Patient has not seen Gerardo Belle for health maintenance and follow-up and is recommended that she follow-up in about a month. Patient to call GI regarding her nausea and need for colonoscopy. Patient does report some black stools due to \"the medications she is takes\". Noted patient takes aspirin regularly and she is not on heartburn medication. Discussed with patient risk for NSAID induced gastric ulcers. Recommended she take Pepcid or over-the-counter PPI while taking the aspirin. Recommended patient avoid taking aspirin regularly due to risk to her kidneys and stomach ulcer. Patient reports history of gastric ulcer. No results found for this visit on 06/29/22.      KARMA Fishman CNP    Note has been electronically signed by provider

## 2022-06-30 LAB
ANA SER QL: NEGATIVE
RHEUMATOID FACT SER QL LA: NEGATIVE

## 2022-07-29 PROBLEM — R05.9 COUGH: Status: RESOLVED | Noted: 2022-06-29 | Resolved: 2022-07-29

## 2022-09-02 RX ORDER — ACYCLOVIR 400 MG/1
TABLET ORAL
Qty: 180 TABLET | Refills: 0 | Status: SHIPPED | OUTPATIENT
Start: 2022-09-02

## 2022-09-30 ENCOUNTER — TELEMEDICINE (OUTPATIENT)
Dept: BEHAVIORAL/MENTAL HEALTH CLINIC | Age: 47
End: 2022-09-30
Payer: COMMERCIAL

## 2022-09-30 DIAGNOSIS — F51.5 NIGHTMARE: Primary | ICD-10-CM

## 2022-09-30 DIAGNOSIS — F31.62 BIPOLAR DISORDER, CURRENT EPISODE MIXED, MODERATE (HCC): ICD-10-CM

## 2022-09-30 DIAGNOSIS — F41.1 GENERALIZED ANXIETY DISORDER: ICD-10-CM

## 2022-09-30 DIAGNOSIS — F51.05 INSOMNIA DUE TO MENTAL CONDITION: ICD-10-CM

## 2022-09-30 PROCEDURE — 99215 OFFICE O/P EST HI 40 MIN: CPT | Performed by: NURSE PRACTITIONER

## 2022-09-30 RX ORDER — ARIPIPRAZOLE 20 MG/1
20 TABLET ORAL DAILY
Qty: 30 TABLET | Refills: 3 | Status: SHIPPED | OUTPATIENT
Start: 2022-09-30 | End: 2022-10-30

## 2022-09-30 RX ORDER — CLONAZEPAM 1 MG/1
1 TABLET ORAL 2 TIMES DAILY PRN
Qty: 60 TABLET | Refills: 2 | Status: SHIPPED | OUTPATIENT
Start: 2022-09-30 | End: 2022-10-30

## 2022-09-30 NOTE — PROGRESS NOTES
OUTPATIENT PSYCHIATRIC RETURN VISIT PROGRESS NOTE    I was in the office While conducting this encounter. Pt was at home. She and/ or her healthcare decision maker is aware that this patient-initialed Telehealth encounter is a billable service with coverage as determined by her insurance carrier. She is aware that she may receive a bill and has provided verbal consent to proceed: Yes    The Virtual visit was conducted via [employer]. Pursuant to the emergency declaration under the [facility] waiver authority and the [employer] Act, this Virtual  Visit was conducted to reduce the patient's risk of exposure to COVID-19 and provide continuity of care for an established patient. Services were provided through a video synchronous discussion virtually to substitute for in-person clinic visit. Due to this being a TeleHealth evaluation, many elements of the physical examination are unable to be assessed. Total Time:  minutes: 40-54 minutes       Date of Service: 9/30/2022     Identification    Leticia Stewart is a 40 y.o.  with a past psychiatric history of bipolar d/o, VICTOR M, insomnia  who presents today for a psychiatric follow up appointment. CC:  Routine medication management follow up. No chief complaint on file. Subjective / Interval History:    Pt visit via [facility]  today and reports that mood has been stable on current medication regimen. Sleeping well. Current medications effective and she denies any adverse effects. Klonopin effective for anxiety prn. Pt has been medication compliant and denies any side-effects. Pt denies SI, HI and AVH. Does not endorse any manic or psychotic symptoms.     Psychiatric Review Of Systems:  Sleep: generally restful sleep  Appetite: ok  Current suicidal/homicidal ideations: Denies SI/ HI  Current auditory/visual hallucinations: Denies     Medications:    Current Outpatient Medications:     acyclovir (ZOVIRAX) 400 MG tablet, TAKE 1 TO 2 TABLETS BY MOUTH EVERY DAY, Disp: 180 tablet, Rfl: 0    tiZANidine (ZANAFLEX) 4 MG tablet, Take 4 mg by mouth every 6 hours as needed, Disp: , Rfl:     HYDROcodone-acetaminophen (NORCO) 5-325 MG per tablet, Take 1 tablet by mouth every 6 hours as needed for Pain., Disp: , Rfl:     fluticasone-salmeterol (ADVAIR DISKUS) 250-50 MCG/ACT AEPB diskus inhaler, Inhale 1 puff into the lungs 2 times daily, Disp: , Rfl:     loratadine (CLARITIN) 10 MG capsule, 1 tablet, Disp: , Rfl:     fluticasone (FLONASE) 50 MCG/ACT nasal spray, , Disp: , Rfl:     clonazePAM (KLONOPIN) 1 MG tablet, Take 1 tablet by mouth 2 times daily as needed for Anxiety for up to 30 days. , Disp: 60 tablet, Rfl: 2    ARIPiprazole (ABILIFY) 20 MG tablet, Take 1 tablet by mouth daily, Disp: 30 tablet, Rfl: 1    QUEtiapine (SEROQUEL) 300 MG tablet, Take 1 tablet by mouth nightly (Patient taking differently: Take 300 mg by mouth nightly 1/2 tablet 150 mg at bedtime.), Disp: 90 tablet, Rfl: 1    albuterol sulfate  (90 Base) MCG/ACT inhaler, Inhale 2 puffs into the lungs every 4 hours as needed, Disp: , Rfl:     aspirin 325 MG tablet, Take 325 mg by mouth every 6 hours as needed, Disp: , Rfl:        PMH:  Past Medical History:   Diagnosis Date    Acid reflux     Anemia     Anxiety     Arthralgia of multiple sites     Asthma     daily inhaler    Bipolar 1 disorder (HCC)     Chronic pain     lower back    Depressive disorder     Genital herpes     MRSA (methicillin resistant staph aureus) culture positive     on skin of abd-- 2015--- was tx---pt states just staph not MRSA    Spinal stenosis        Vitals: There were no vitals filed for this visit.     ROS:  Psychological ROS: negative for depression, irritability, mood swings, and sleep disturbance    Mental Status Exam:   Appearance  Appears stated age, Well-kept, Appropriately attired, Cooperative, and Maintains eye-contact  Behavior  Cooperative and Pleasant  Psychomotor Activity within normal limits  Gait and Station Normal Shahla and Station and Normal Balance  Speech   appropriate  Mood    is euthymic  Affect    calm  Thought Process Goal-Directed and Circumstantial  Thought Content/Perceptual Disturbances free of delusions, free of hallucinations, and not internally preoccupied  Cognition/Sensorium Alert, Fully oriented, Normal concentration/ Attention, Recent memory intact, Remote memory intact, and Fund of knowledge adequate for level of education  Insight  good  Judgment good  Assessment:    Diagnoses and all orders for this visit:    Nightmare    Generalized anxiety disorder  -     clonazePAM (KLONOPIN) 1 MG tablet; Take 1 tablet by mouth 2 times daily as needed for Anxiety for up to 30 days. Insomnia due to mental condition    Bipolar disorder, current episode mixed, moderate (HCC)    Other orders  -     ARIPiprazole (ABILIFY) 20 MG tablet; Take 1 tablet by mouth daily     Patient Education and Counseling:  Supportive therapy provided for identified psychosocial stressors. Medication education provided and decisions regarding medication regimen discussed with patient. Plan:  -  Continue Seroquel, Abilify and prn Klonopin as currently ordered. -  Crisis plan reviewed and patient verbally contracts for safety. Go to ED with emergent symptoms or safety concerns.  -  Risks, benefits, side effects of medications, including any / all black box warnings, discussed with patient, who verbalizes their understanding.     - [employer] web site checked for controlled substances. Disposition:  home    Follow Up:  Return in 3 months, or call in the interim for any side-effects, decompensation, questions, or problems between now and the next visit. Return in about 3 months (around 12/30/2022) for Follow up.         43 minutes face to face with the patient with more than 50% of the total time spent on education, counseling, & coordination of care of the patient regarding ongoing psychiatric illness.         [address]

## 2022-12-23 ENCOUNTER — PATIENT MESSAGE (OUTPATIENT)
Dept: FAMILY MEDICINE CLINIC | Facility: CLINIC | Age: 47
End: 2022-12-23

## 2022-12-23 DIAGNOSIS — L98.9 SKIN LESION OF BACK: Primary | ICD-10-CM

## 2022-12-23 NOTE — TELEPHONE ENCOUNTER
From: Estefania Diaz  To: Donna Sen  Sent: 12/23/2022 8:20 AM EST  Subject: Referral    Hello,   I have a black dot on my back that has spread feels like scabs.  Can you refer me to a dermatologist?

## 2022-12-27 ENCOUNTER — TELEMEDICINE (OUTPATIENT)
Dept: BEHAVIORAL/MENTAL HEALTH CLINIC | Age: 47
End: 2022-12-27
Payer: COMMERCIAL

## 2022-12-27 DIAGNOSIS — F51.05 INSOMNIA DUE TO MENTAL CONDITION: ICD-10-CM

## 2022-12-27 DIAGNOSIS — F41.1 GENERALIZED ANXIETY DISORDER: ICD-10-CM

## 2022-12-27 DIAGNOSIS — F31.62 BIPOLAR DISORDER, CURRENT EPISODE MIXED, MODERATE (HCC): Primary | ICD-10-CM

## 2022-12-27 PROCEDURE — 99215 OFFICE O/P EST HI 40 MIN: CPT | Performed by: NURSE PRACTITIONER

## 2022-12-27 RX ORDER — QUETIAPINE FUMARATE 300 MG/1
150 TABLET, FILM COATED ORAL NIGHTLY
Qty: 45 TABLET | Refills: 1 | Status: SHIPPED | OUTPATIENT
Start: 2022-12-27 | End: 2023-03-27

## 2022-12-27 RX ORDER — ARIPIPRAZOLE 20 MG/1
20 TABLET ORAL DAILY
Qty: 90 TABLET | Refills: 1 | Status: SHIPPED | OUTPATIENT
Start: 2022-12-27 | End: 2023-03-27

## 2022-12-27 RX ORDER — CLONAZEPAM 1 MG/1
1 TABLET ORAL DAILY PRN
Qty: 30 TABLET | Refills: 2 | Status: SHIPPED | OUTPATIENT
Start: 2022-12-27 | End: 2023-01-26

## 2022-12-27 ASSESSMENT — PATIENT HEALTH QUESTIONNAIRE - PHQ9
SUM OF ALL RESPONSES TO PHQ QUESTIONS 1-9: 0
SUM OF ALL RESPONSES TO PHQ9 QUESTIONS 1 & 2: 0
SUM OF ALL RESPONSES TO PHQ QUESTIONS 1-9: 0
1. LITTLE INTEREST OR PLEASURE IN DOING THINGS: 0
2. FEELING DOWN, DEPRESSED OR HOPELESS: 0
SUM OF ALL RESPONSES TO PHQ QUESTIONS 1-9: 0
SUM OF ALL RESPONSES TO PHQ QUESTIONS 1-9: 0

## 2022-12-27 NOTE — PROGRESS NOTES
sleep  Appetite: ok  Current suicidal/homicidal ideations: Denies SI/ HI  Current auditory/visual hallucinations: Denies     Medications:    Current Outpatient Medications:     ARIPiprazole (ABILIFY) 20 MG tablet, Take 1 tablet by mouth daily, Disp: 30 tablet, Rfl: 3    clonazePAM (KLONOPIN) 1 MG tablet, Take 1 tablet by mouth 2 times daily as needed for Anxiety for up to 30 days. , Disp: 60 tablet, Rfl: 2    acyclovir (ZOVIRAX) 400 MG tablet, TAKE 1 TO 2 TABLETS BY MOUTH EVERY DAY, Disp: 180 tablet, Rfl: 0    tiZANidine (ZANAFLEX) 4 MG tablet, Take 4 mg by mouth every 6 hours as needed, Disp: , Rfl:     HYDROcodone-acetaminophen (NORCO) 5-325 MG per tablet, Take 1 tablet by mouth every 6 hours as needed for Pain., Disp: , Rfl:     fluticasone-salmeterol (ADVAIR) 250-50 MCG/ACT AEPB diskus inhaler, Inhale 1 puff into the lungs 2 times daily, Disp: , Rfl:     loratadine (CLARITIN) 10 MG capsule, 1 tablet, Disp: , Rfl:     fluticasone (FLONASE) 50 MCG/ACT nasal spray, , Disp: , Rfl:     QUEtiapine (SEROQUEL) 300 MG tablet, Take 1 tablet by mouth nightly (Patient taking differently: Take 300 mg by mouth nightly 1/2 tablet 150 mg at bedtime.), Disp: 90 tablet, Rfl: 1    albuterol sulfate  (90 Base) MCG/ACT inhaler, Inhale 2 puffs into the lungs every 4 hours as needed, Disp: , Rfl:     aspirin 325 MG tablet, Take 325 mg by mouth every 6 hours as needed, Disp: , Rfl:        PMH:  Past Medical History:   Diagnosis Date    Acid reflux     Anemia     Anxiety     Arthralgia of multiple sites     Asthma     daily inhaler    Bipolar 1 disorder (HCC)     Chronic pain     lower back    Depressive disorder     Genital herpes     MRSA (methicillin resistant staph aureus) culture positive     on skin of abd-- 2015--- was tx---pt states just staph not MRSA    Spinal stenosis        Vitals: There were no vitals filed for this visit.     ROS:  Psychological ROS: positive for anxiety    Mental Status Exam:   Appearance  Appears stated age, Well-kept, Appropriately attired, Well-nourished, Cooperative, and Maintains eye-contact  Behavior  Cooperative and Pleasant  Psychomotor Activity within normal limits  Gait and Station Normal Shahla and Station and Normal Balance  Speech   appropriate  Mood    is euthymic  Affect    calm  Thought Process Goal-Directed and Circumstantial  Thought Content/Perceptual Disturbances free of delusions, free of hallucinations, and not internally preoccupied  Cognition/Sensorium Alert, Fully oriented, Normal concentration/ Attention, Recent memory intact, Remote memory intact, and Fund of knowledge adequate for level of education  Insight  good  Judgment good  Assessment:    Fredi Hogan was seen today for follow-up. Diagnoses and all orders for this visit:    Bipolar disorder, current episode mixed, moderate (HCC)    Generalized anxiety disorder  -     clonazePAM (KLONOPIN) 1 MG tablet; Take 1 tablet by mouth daily as needed for Anxiety for up to 30 days. Max Daily Amount: 1 mg    Insomnia due to mental condition    Other orders  -     ARIPiprazole (ABILIFY) 20 MG tablet; Take 1 tablet by mouth daily  -     QUEtiapine (SEROQUEL) 300 MG tablet; Take 0.5 tablets by mouth nightly 1/2 tablet 150 mg at bedtime. Patient Education and Counseling:  Supportive therapy provided for identified psychosocial stressors. Medication education provided and decisions regarding medication regimen discussed with patient. Plan:  -  Continue Abilify 20 mg Q AM, quetiapine 150 mg HS. Decrease Klonopin to 1 mg daily prn anxiety. -  Crisis plan reviewed and patient verbally contracts for safety. Go to ED with emergent symptoms or safety concerns.  -  Risks, benefits, side effects of medications, including any / all black box warnings, discussed with patient, who verbalizes their understanding.     - [employer] web site checked for controlled substances.      Disposition:  home    Follow Up:  Return in 3 months, or call in the interim for any side-effects, decompensation, questions, or problems between now and the next visit. Return in about 3 months (around 3/27/2023) for Follow up.        45 minutes face to face with the patient with more than 50% of the total time spent on education, counseling, & coordination of care of the patient regarding ongoing psychiatric illness.         [address] [facility] [address]

## 2023-01-12 ENCOUNTER — OFFICE VISIT (OUTPATIENT)
Dept: OCCUPATIONAL MEDICINE | Age: 48
End: 2023-01-12

## 2023-01-12 VITALS
DIASTOLIC BLOOD PRESSURE: 80 MMHG | HEART RATE: 89 BPM | SYSTOLIC BLOOD PRESSURE: 120 MMHG | TEMPERATURE: 98.5 F | OXYGEN SATURATION: 98 %

## 2023-01-12 DIAGNOSIS — J01.90 ACUTE NON-RECURRENT SINUSITIS, UNSPECIFIED LOCATION: Primary | ICD-10-CM

## 2023-01-12 DIAGNOSIS — T36.95XA ANTIBIOTIC-INDUCED YEAST INFECTION: ICD-10-CM

## 2023-01-12 DIAGNOSIS — H65.92 LEFT NON-SUPPURATIVE OTITIS MEDIA: ICD-10-CM

## 2023-01-12 DIAGNOSIS — J31.0 CHRONIC RHINITIS: ICD-10-CM

## 2023-01-12 DIAGNOSIS — B37.9 ANTIBIOTIC-INDUCED YEAST INFECTION: ICD-10-CM

## 2023-01-12 DIAGNOSIS — J30.2 SEASONAL ALLERGIES: ICD-10-CM

## 2023-01-12 RX ORDER — FLUTICASONE PROPIONATE 50 MCG
2 SPRAY, SUSPENSION (ML) NASAL DAILY
Qty: 16 G | Refills: 2 | Status: SHIPPED | OUTPATIENT
Start: 2023-01-12

## 2023-01-12 RX ORDER — BACLOFEN 5 MG/1
TABLET ORAL
COMMUNITY
Start: 2022-11-22

## 2023-01-12 RX ORDER — LORATADINE 10 MG/1
CAPSULE, LIQUID FILLED ORAL
Qty: 30 CAPSULE | Refills: 2 | Status: SHIPPED | OUTPATIENT
Start: 2023-01-12

## 2023-01-12 RX ORDER — AMOXICILLIN AND CLAVULANATE POTASSIUM 875; 125 MG/1; MG/1
1 TABLET, FILM COATED ORAL 2 TIMES DAILY
Qty: 14 TABLET | Refills: 0 | Status: SHIPPED | OUTPATIENT
Start: 2023-01-12 | End: 2023-01-19

## 2023-01-12 RX ORDER — FLUCONAZOLE 150 MG/1
150 TABLET ORAL ONCE
Qty: 2 TABLET | Refills: 0 | Status: SHIPPED | OUTPATIENT
Start: 2023-01-12 | End: 2023-01-12

## 2023-01-12 ASSESSMENT — ENCOUNTER SYMPTOMS
CHEST TIGHTNESS: 0
EYES NEGATIVE: 1
NAUSEA: 1
WHEEZING: 0
SHORTNESS OF BREATH: 0
DIARRHEA: 0
ABDOMINAL PAIN: 0
COUGH: 0
SINUS PRESSURE: 1
SORE THROAT: 0
RHINORRHEA: 1
VOMITING: 0
SINUS PAIN: 1

## 2023-01-12 NOTE — PROGRESS NOTES
PROGRESS NOTE    SUBJECTIVE:     Yrn Ying is a 56 y.o. female seen for:    Chief Complaint    Otalgia       Otalgia   There is pain in the left ear. This is a new problem. Episode onset: Last Friday. The problem has been gradually worsening. There has been no fever. The pain is mild. Associated symptoms include headaches and rhinorrhea. Pertinent negatives include no abdominal pain, coughing, diarrhea, ear discharge, hearing loss, neck pain, rash, sore throat or vomiting. Treatments tried: Amoxicillin x 3 days (old medication she had at home) The treatment provided mild relief. Her past medical history is significant for a chronic ear infection. There is no history of hearing loss or a tympanostomy tube. History of sinus and ear infections. Patient's most bothersome symptoms include left ear & teeth pain. Current Outpatient Medications   Medication Sig Dispense Refill    Baclofen (LIORESAL) 5 MG tablet TAKE 1 TABLET BY MOUTH THREE TIMES A DAY AS NEEDED FOR 30 DAYS      fluticasone (FLONASE) 50 MCG/ACT nasal spray 2 sprays by Nasal route daily 16 g 2    loratadine (CLARITIN) 10 MG capsule Take 1 tablet by mouth daily. 30 capsule 2    amoxicillin-clavulanate (AUGMENTIN) 875-125 MG per tablet Take 1 tablet by mouth 2 times daily for 7 days 14 tablet 0    fluconazole (DIFLUCAN) 150 MG tablet Take 1 tablet by mouth once for 1 dose , then take 1 tablet by mouth 72 hours later if symptoms persist. 2 tablet 0    ARIPiprazole (ABILIFY) 20 MG tablet Take 1 tablet by mouth daily 90 tablet 1    QUEtiapine (SEROQUEL) 300 MG tablet Take 0.5 tablets by mouth nightly 1/2 tablet 150 mg at bedtime. 45 tablet 1    clonazePAM (KLONOPIN) 1 MG tablet Take 1 tablet by mouth daily as needed for Anxiety for up to 30 days.  Max Daily Amount: 1 mg 30 tablet 2    acyclovir (ZOVIRAX) 400 MG tablet TAKE 1 TO 2 TABLETS BY MOUTH EVERY  tablet 0    HYDROcodone-acetaminophen (NORCO) 5-325 MG per tablet Take 1 tablet by mouth every 6 hours as needed for Pain. albuterol sulfate  (90 Base) MCG/ACT inhaler Inhale 2 puffs into the lungs every 4 hours as needed      aspirin 325 MG tablet Take 325 mg by mouth every 6 hours as needed      tiZANidine (ZANAFLEX) 4 MG tablet Take 4 mg by mouth every 6 hours as needed (Patient not taking: Reported on 1/12/2023)      fluticasone-salmeterol (ADVAIR) 250-50 MCG/ACT AEPB diskus inhaler Inhale 1 puff into the lungs 2 times daily (Patient not taking: Reported on 1/12/2023)       No current facility-administered medications for this visit. Allergies   Allergen Reactions    Tramadol Rash     Social History     Tobacco Use    Smoking status: Never    Smokeless tobacco: Never   Substance Use Topics    Alcohol use: Yes    Drug use: No      Review of Systems   Constitutional:  Negative for chills, fatigue and fever. HENT:  Positive for ear pain, rhinorrhea, sinus pressure and sinus pain. Negative for ear discharge, hearing loss, postnasal drip, sneezing and sore throat. Eyes: Negative. Respiratory:  Negative for cough, chest tightness, shortness of breath and wheezing. Cardiovascular: Negative. Gastrointestinal:  Positive for nausea. Negative for abdominal pain, diarrhea and vomiting. Endocrine: Negative. Genitourinary: Negative. Musculoskeletal:  Negative for arthralgias, myalgias and neck pain. Skin:  Negative for rash. Allergic/Immunologic: Positive for environmental allergies. Neurological:  Positive for headaches. Negative for dizziness and light-headedness. Hematological: Negative. Psychiatric/Behavioral: Negative. OBJECTIVE:    /80 (Site: Left Upper Arm, Position: Sitting)   Pulse 89   Temp 98.5 °F (36.9 °C) (Oral)   LMP 12/17/2022 (Approximate)   SpO2 98%   Breastfeeding No      Physical Exam  Vitals reviewed. Constitutional:       General: She is not in acute distress. Appearance: Normal appearance.    HENT:      Head: Normocephalic and atraumatic. Right Ear: Hearing, ear canal and external ear normal. A middle ear effusion is present. Tympanic membrane is not injected or erythematous. Left Ear: Hearing, ear canal and external ear normal. A middle ear effusion is present. Tympanic membrane is injected and erythematous. Nose: Congestion and rhinorrhea present. Mouth/Throat:      Mouth: Mucous membranes are moist.      Pharynx: Oropharynx is clear. Eyes:      Conjunctiva/sclera: Conjunctivae normal.   Cardiovascular:      Rate and Rhythm: Normal rate and regular rhythm. Pulses: Normal pulses. Heart sounds: Normal heart sounds. No murmur heard. No friction rub. No gallop. Pulmonary:      Effort: Pulmonary effort is normal. No respiratory distress. Breath sounds: Normal breath sounds. No stridor. No wheezing, rhonchi or rales. Skin:     General: Skin is warm and dry. Neurological:      Mental Status: She is alert and oriented to person, place, and time. Psychiatric:         Mood and Affect: Mood normal.         Behavior: Behavior normal.     ASSESSMENT and PLAN    Abi Palm was seen today for otalgia. Diagnoses and all orders for this visit:    Acute non-recurrent sinusitis, unspecified location  -     amoxicillin-clavulanate (AUGMENTIN) 875-125 MG per tablet; Take 1 tablet by mouth 2 times daily for 7 days    Left non-suppurative otitis media  -     amoxicillin-clavulanate (AUGMENTIN) 875-125 MG per tablet; Take 1 tablet by mouth 2 times daily for 7 days    Antibiotic-induced yeast infection  -     fluconazole (DIFLUCAN) 150 MG tablet; Take 1 tablet by mouth once for 1 dose , then take 1 tablet by mouth 72 hours later if symptoms persist.    Chronic rhinitis  -     fluticasone (FLONASE) 50 MCG/ACT nasal spray; 2 sprays by Nasal route daily    Seasonal allergies  -     loratadine (CLARITIN) 10 MG capsule; Take 1 tablet by mouth daily. Take antibiotic with food to avoid GI upset.  RTC if symptoms fail to improve with treatment. Restart Flonase & Claritin (patient was out of medication). To ER with severe/worsening symptoms. See PCP for routine care. Counseled on benefits of having a primary care provider which includes, but is not limited to, continuity of care and having a medical home when concerns arise. Also enforced that onsite clinic policy states that we are not to take the place of a primary care provider, pt verbalized understanding. SEs and risk vs benefits associated with medications prescribed discussed with patient who verbalized understanding. Pt verbalized understanding and agreement with plan of care. RTC for persisting/worsening symptoms or new complaints that arise. Discussed signs and symptoms that would warrant immediate evaluation including, but not limited to HA, blurred vision, speech disturbance, difficulty with ambulation/gait, numbness, tingling, weakness, syncope, chest pain, or shortness of breath. I have reviewed the patient's medication list, past medical, family, social, and surgical history in detail and updated the patient record appropriately.     Juan Duffy, APRN - CNP

## 2023-01-18 ENCOUNTER — CLINICAL DOCUMENTATION (OUTPATIENT)
Dept: PHYSICAL THERAPY | Age: 48
End: 2023-01-18

## 2023-01-18 NOTE — THERAPY DISCHARGE
[facility] [address]  [address] [address]  Phone: [phone]  Fax: [phone]    OUTPATIENT PHYSICAL THERAPY  Discontinuation Summary 2023  Episode  Appt Roberto Silva has been seen in physical therapy for 1 visit on 6/3/22 with 0 cancellations and 0 no shows.  Ms. Edwar Ross therapy has come to an end at this time due to: Patient did not return for/schedule additional treatment  And plan of care has     Physical Therapy Goals:  Not Met: Reasons for goals not being achieved: unable to assess progress with goals as patient was only seen for evaluation and did not return to therapy    Clarissa Valles, PT

## 2023-04-06 ENCOUNTER — OFFICE VISIT (OUTPATIENT)
Dept: OCCUPATIONAL MEDICINE | Age: 48
End: 2023-04-06

## 2023-04-06 ENCOUNTER — NURSE TRIAGE (OUTPATIENT)
Dept: OTHER | Facility: CLINIC | Age: 48
End: 2023-04-06

## 2023-04-06 DIAGNOSIS — B37.9 ANTIBIOTIC-INDUCED YEAST INFECTION: ICD-10-CM

## 2023-04-06 DIAGNOSIS — R06.02 SHORTNESS OF BREATH: ICD-10-CM

## 2023-04-06 DIAGNOSIS — J30.2 SEASONAL ALLERGIES: ICD-10-CM

## 2023-04-06 DIAGNOSIS — T36.95XA ANTIBIOTIC-INDUCED YEAST INFECTION: ICD-10-CM

## 2023-04-06 DIAGNOSIS — J01.90 ACUTE NON-RECURRENT SINUSITIS, UNSPECIFIED LOCATION: Primary | ICD-10-CM

## 2023-04-06 DIAGNOSIS — H65.02 NON-RECURRENT ACUTE SEROUS OTITIS MEDIA OF LEFT EAR: ICD-10-CM

## 2023-04-06 RX ORDER — FEXOFENADINE HCL 180 MG/1
180 TABLET ORAL DAILY
Qty: 90 TABLET | Refills: 1 | Status: SHIPPED | OUTPATIENT
Start: 2023-04-06 | End: 2023-05-06

## 2023-04-06 RX ORDER — METHOCARBAMOL 750 MG/1
750 TABLET, FILM COATED ORAL NIGHTLY PRN
COMMUNITY
Start: 2023-02-16

## 2023-04-06 RX ORDER — FLUCONAZOLE 150 MG/1
TABLET ORAL
Qty: 2 TABLET | Refills: 0 | Status: SHIPPED | OUTPATIENT
Start: 2023-04-06

## 2023-04-06 RX ORDER — OMEPRAZOLE 20 MG/1
1 CAPSULE, DELAYED RELEASE ORAL DAILY
COMMUNITY
Start: 2019-06-05 | End: 2023-04-07 | Stop reason: SDUPTHER

## 2023-04-06 RX ORDER — AMOXICILLIN AND CLAVULANATE POTASSIUM 875; 125 MG/1; MG/1
1 TABLET, FILM COATED ORAL 2 TIMES DAILY
Qty: 14 TABLET | Refills: 0 | Status: SHIPPED | OUTPATIENT
Start: 2023-04-06 | End: 2023-04-13

## 2023-04-06 RX ORDER — FLUTICASONE PROPIONATE 50 MCG
2 SPRAY, SUSPENSION (ML) NASAL DAILY
Qty: 16 G | Refills: 2 | Status: SHIPPED | OUTPATIENT
Start: 2023-04-06

## 2023-04-06 ASSESSMENT — ENCOUNTER SYMPTOMS
VOMITING: 0
WHEEZING: 0
SHORTNESS OF BREATH: 1
COUGH: 0
NAUSEA: 0
DIARRHEA: 0
CHEST TIGHTNESS: 0
SINUS PRESSURE: 1
SINUS PAIN: 1
RHINORRHEA: 1
EYES NEGATIVE: 1
ABDOMINAL PAIN: 1
SORE THROAT: 1

## 2023-04-06 NOTE — TELEPHONE ENCOUNTER
Location of patient: [place]    Received call from [facility] with [employer]. Subjective: Caller states \"I am having some numbness in my arms, headaches, pain in my legs and stomach. \"     Current Symptoms: stomach pain, left arm numb, headaches, pain in both legs,     Onset:  arm numbness (1 month, comes and goes), leg pain (yesterday), stomach pain (3 weeks ago)     Associated Symptoms: NA    Pain Severity: 6/10; stomach pain constant     Temperature: none     What has been tried: pepto, pepcid, aspirin     Recommended disposition: Go to ED/UCC Now (Or to Office with PCP Approval) - connected with Kaila Wilson in the office. Care advice provided, patient verbalizes understanding; denies any other questions or concerns; instructed to call back for any new or worsening symptoms. Attention Provider: Thank you for allowing me to participate in the care of your patient. The patient was connected to triage in response to information provided to the ECC/PSC. Please do not respond through this encounter as the response is not directed to a shared pool.       Reason for Disposition   Constant abdominal pain lasting > 2 hours    Protocols used: Abdominal Pain - Female-ADULT-[place]

## 2023-04-06 NOTE — TELEPHONE ENCOUNTER
Patient states she has been having stomach pain for a month. Patient instructed to call the office at 8 am for a same day appointment or if needed to go to an urgent care or ER.

## 2023-04-07 ENCOUNTER — TELEPHONE (OUTPATIENT)
Dept: FAMILY MEDICINE CLINIC | Facility: CLINIC | Age: 48
End: 2023-04-07

## 2023-04-07 ENCOUNTER — OFFICE VISIT (OUTPATIENT)
Dept: FAMILY MEDICINE CLINIC | Facility: CLINIC | Age: 48
End: 2023-04-07
Payer: COMMERCIAL

## 2023-04-07 VITALS
HEIGHT: 68 IN | WEIGHT: 180 LBS | DIASTOLIC BLOOD PRESSURE: 84 MMHG | OXYGEN SATURATION: 99 % | SYSTOLIC BLOOD PRESSURE: 122 MMHG | HEART RATE: 78 BPM | BODY MASS INDEX: 27.28 KG/M2 | TEMPERATURE: 98.5 F

## 2023-04-07 DIAGNOSIS — R25.2 MUSCLE CRAMPS: ICD-10-CM

## 2023-04-07 DIAGNOSIS — R68.89 OTHER GENERAL SYMPTOMS AND SIGNS: ICD-10-CM

## 2023-04-07 DIAGNOSIS — Z13.220 ENCOUNTER FOR SCREENING FOR LIPID DISORDER: ICD-10-CM

## 2023-04-07 DIAGNOSIS — Z12.11 COLON CANCER SCREENING: ICD-10-CM

## 2023-04-07 DIAGNOSIS — R39.9 LOWER URINARY TRACT SYMPTOMS (LUTS): ICD-10-CM

## 2023-04-07 DIAGNOSIS — M54.12 CERVICAL RADICULOPATHY: ICD-10-CM

## 2023-04-07 DIAGNOSIS — R06.02 SOB (SHORTNESS OF BREATH): ICD-10-CM

## 2023-04-07 DIAGNOSIS — J45.40 MODERATE PERSISTENT ASTHMA WITHOUT COMPLICATION: ICD-10-CM

## 2023-04-07 DIAGNOSIS — R25.2 MUSCLE CRAMPS: Primary | ICD-10-CM

## 2023-04-07 DIAGNOSIS — K21.9 GASTROESOPHAGEAL REFLUX DISEASE, UNSPECIFIED WHETHER ESOPHAGITIS PRESENT: ICD-10-CM

## 2023-04-07 DIAGNOSIS — R07.9 CHEST PAIN, UNSPECIFIED TYPE: ICD-10-CM

## 2023-04-07 DIAGNOSIS — R10.9 GASTRIC PAIN: ICD-10-CM

## 2023-04-07 LAB
BILIRUBIN, URINE, POC: NEGATIVE
BLOOD URINE, POC: NEGATIVE
ERYTHROCYTE [DISTWIDTH] IN BLOOD BY AUTOMATED COUNT: 14.7 % (ref 11.9–14.6)
GLUCOSE URINE, POC: NEGATIVE
HCT VFR BLD AUTO: 39.2 % (ref 35.8–46.3)
HGB BLD-MCNC: 11.8 G/DL (ref 11.7–15.4)
KETONES, URINE, POC: NEGATIVE
LEUKOCYTE ESTERASE, URINE, POC: NEGATIVE
MCH RBC QN AUTO: 24.1 PG (ref 26.1–32.9)
MCHC RBC AUTO-ENTMCNC: 30.1 G/DL (ref 31.4–35)
MCV RBC AUTO: 80.2 FL (ref 82–102)
NITRITE, URINE, POC: NEGATIVE
NRBC # BLD: 0 K/UL (ref 0–0.2)
PH, URINE, POC: 7.5 (ref 4.6–8)
PLATELET # BLD AUTO: 189 K/UL (ref 150–450)
PMV BLD AUTO: 12.4 FL (ref 9.4–12.3)
PROTEIN,URINE, POC: NEGATIVE
RBC # BLD AUTO: 4.89 M/UL (ref 4.05–5.2)
SPECIFIC GRAVITY, URINE, POC: 1.02 (ref 1–1.03)
URINALYSIS CLARITY, POC: CLEAR
URINALYSIS COLOR, POC: YELLOW
UROBILINOGEN, POC: NORMAL
WBC # BLD AUTO: 7.8 K/UL (ref 4.3–11.1)

## 2023-04-07 PROCEDURE — 81003 URINALYSIS AUTO W/O SCOPE: CPT | Performed by: NURSE PRACTITIONER

## 2023-04-07 PROCEDURE — 93000 ELECTROCARDIOGRAM COMPLETE: CPT | Performed by: NURSE PRACTITIONER

## 2023-04-07 PROCEDURE — 99214 OFFICE O/P EST MOD 30 MIN: CPT | Performed by: NURSE PRACTITIONER

## 2023-04-07 RX ORDER — MONTELUKAST SODIUM 5 MG/1
10 TABLET, CHEWABLE ORAL NIGHTLY
Qty: 60 TABLET | Refills: 2 | Status: CANCELLED | OUTPATIENT
Start: 2023-04-07

## 2023-04-07 RX ORDER — SUCRALFATE 1 G/1
1 TABLET ORAL 4 TIMES DAILY
Qty: 120 TABLET | Refills: 0 | Status: SHIPPED | OUTPATIENT
Start: 2023-04-07

## 2023-04-07 RX ORDER — OMEPRAZOLE 40 MG/1
40 CAPSULE, DELAYED RELEASE ORAL DAILY
Qty: 30 CAPSULE | Refills: 2 | Status: SHIPPED | OUTPATIENT
Start: 2023-04-07

## 2023-04-07 SDOH — ECONOMIC STABILITY: FOOD INSECURITY: WITHIN THE PAST 12 MONTHS, THE FOOD YOU BOUGHT JUST DIDN'T LAST AND YOU DIDN'T HAVE MONEY TO GET MORE.: NEVER TRUE

## 2023-04-07 SDOH — ECONOMIC STABILITY: HOUSING INSECURITY
IN THE LAST 12 MONTHS, WAS THERE A TIME WHEN YOU DID NOT HAVE A STEADY PLACE TO SLEEP OR SLEPT IN A SHELTER (INCLUDING NOW)?: NO

## 2023-04-07 SDOH — ECONOMIC STABILITY: FOOD INSECURITY: WITHIN THE PAST 12 MONTHS, YOU WORRIED THAT YOUR FOOD WOULD RUN OUT BEFORE YOU GOT MONEY TO BUY MORE.: NEVER TRUE

## 2023-04-07 SDOH — ECONOMIC STABILITY: INCOME INSECURITY: HOW HARD IS IT FOR YOU TO PAY FOR THE VERY BASICS LIKE FOOD, HOUSING, MEDICAL CARE, AND HEATING?: NOT HARD AT ALL

## 2023-04-07 ASSESSMENT — PATIENT HEALTH QUESTIONNAIRE - PHQ9
SUM OF ALL RESPONSES TO PHQ9 QUESTIONS 1 & 2: 0
7. TROUBLE CONCENTRATING ON THINGS, SUCH AS READING THE NEWSPAPER OR WATCHING TELEVISION: 0
SUM OF ALL RESPONSES TO PHQ QUESTIONS 1-9: 0
10. IF YOU CHECKED OFF ANY PROBLEMS, HOW DIFFICULT HAVE THESE PROBLEMS MADE IT FOR YOU TO DO YOUR WORK, TAKE CARE OF THINGS AT HOME, OR GET ALONG WITH OTHER PEOPLE: 0
3. TROUBLE FALLING OR STAYING ASLEEP: 0
4. FEELING TIRED OR HAVING LITTLE ENERGY: 0
5. POOR APPETITE OR OVEREATING: 0
2. FEELING DOWN, DEPRESSED OR HOPELESS: 0
SUM OF ALL RESPONSES TO PHQ QUESTIONS 1-9: 0
SUM OF ALL RESPONSES TO PHQ QUESTIONS 1-9: 0
1. LITTLE INTEREST OR PLEASURE IN DOING THINGS: 0
SUM OF ALL RESPONSES TO PHQ QUESTIONS 1-9: 0
8. MOVING OR SPEAKING SO SLOWLY THAT OTHER PEOPLE COULD HAVE NOTICED. OR THE OPPOSITE, BEING SO FIGETY OR RESTLESS THAT YOU HAVE BEEN MOVING AROUND A LOT MORE THAN USUAL: 0
9. THOUGHTS THAT YOU WOULD BE BETTER OFF DEAD, OR OF HURTING YOURSELF: 0
6. FEELING BAD ABOUT YOURSELF - OR THAT YOU ARE A FAILURE OR HAVE LET YOURSELF OR YOUR FAMILY DOWN: 0

## 2023-04-07 ASSESSMENT — ENCOUNTER SYMPTOMS
FACIAL SWELLING: 0
BACK PAIN: 1
DIARRHEA: 0
VOMITING: 0
PHOTOPHOBIA: 1
NAUSEA: 1
SHORTNESS OF BREATH: 1
CONSTIPATION: 0
BLOOD IN STOOL: 0
ABDOMINAL PAIN: 1
COUGH: 0
WHEEZING: 1
SINUS PAIN: 1

## 2023-04-07 NOTE — PROGRESS NOTES
week, please contact office. You can also review results on [employer]. Previous cardiology w/u years ago unknown completion/Renetta. Follow-up and Dispositions    Return in about 1 week (around 4/14/2023) for lucretia; or as close to one week.          Aldo Sequeira, APRN - CNP

## 2023-04-07 NOTE — PROGRESS NOTES
by Nasal route daily    Seasonal allergies  -     fexofenadine (ALLEGRA) 180 MG tablet; Take 1 tablet by mouth daily    Non-recurrent acute serous otitis media of left ear  -     fluticasone (FLONASE) 50 MCG/ACT nasal spray; 2 sprays by Nasal route daily    Antibiotic-induced yeast infection  -     fluconazole (DIFLUCAN) 150 MG tablet; Take 1 tablet by mouth once, then take 1 tablet by mouth 72 hours later if symptoms persist.    Shortness of breath    Take Augmentin with food to avoid GI upset. Only take Diflucan if vaginal yeast symptoms develop. RTC if symptoms fail to improve with treatment. Advised patient to go to ER or Urgent Care (if she will not go to ER) for further evaluation of headaches, shortness of breath, left arm tingling, abdominal pain, and calf pain x 1 month. Patient states she would rather see her PCP and she usually can get it pretty quickly with them. Discussed how these symptoms may be cardiac related; therefore, very important to get evaluated. To ER with worsening symptoms. Follow up with PCP for routine care. Patient verbalized understanding. Counseled on benefits of having a primary care provider which includes, but is not limited to, continuity of care and having a medical home when concerns arise. Also enforced that onsite clinic policy states that we are not to take the place of a primary care provider, pt verbalized understanding. SEs and risk vs benefits associated with medications prescribed discussed with patient who verbalized understanding. Pt verbalized understanding and agreement with plan of care. RTC for persisting/worsening symptoms or new complaints that arise. Discussed signs and symptoms that would warrant immediate evaluation including, but not limited to HA, blurred vision, speech disturbance, difficulty with ambulation/gait, numbness, tingling, weakness, syncope, chest pain, or shortness of breath.     I have reviewed the patient's medication list, past

## 2023-04-08 LAB
ALBUMIN SERPL-MCNC: 3.7 G/DL (ref 3.5–5)
ALBUMIN/GLOB SERPL: 1.1 (ref 0.4–1.6)
ALP SERPL-CCNC: 55 U/L (ref 50–136)
ALT SERPL-CCNC: 16 U/L (ref 12–65)
ANION GAP SERPL CALC-SCNC: 4 MMOL/L (ref 2–11)
AST SERPL-CCNC: 17 U/L (ref 15–37)
BILIRUB SERPL-MCNC: 0.3 MG/DL (ref 0.2–1.1)
BUN SERPL-MCNC: 8 MG/DL (ref 6–23)
CALCIUM SERPL-MCNC: 8.5 MG/DL (ref 8.3–10.4)
CHLORIDE SERPL-SCNC: 109 MMOL/L (ref 101–110)
CHOLEST SERPL-MCNC: 177 MG/DL
CO2 SERPL-SCNC: 26 MMOL/L (ref 21–32)
CREAT SERPL-MCNC: 0.7 MG/DL (ref 0.6–1)
GLOBULIN SER CALC-MCNC: 3.5 G/DL (ref 2.8–4.5)
GLUCOSE SERPL-MCNC: 80 MG/DL (ref 65–100)
HDLC SERPL-MCNC: 69 MG/DL (ref 40–60)
HDLC SERPL: 2.6
LDLC SERPL CALC-MCNC: 96.4 MG/DL
MAGNESIUM SERPL-MCNC: 2.2 MG/DL (ref 1.8–2.4)
POTASSIUM SERPL-SCNC: 3.9 MMOL/L (ref 3.5–5.1)
PROT SERPL-MCNC: 7.2 G/DL (ref 6.3–8.2)
SODIUM SERPL-SCNC: 139 MMOL/L (ref 133–143)
TRIGL SERPL-MCNC: 58 MG/DL (ref 35–150)
TSH W FREE THYROID IF ABNORMAL: 1.23 UIU/ML (ref 0.36–3.74)
VLDLC SERPL CALC-MCNC: 11.6 MG/DL (ref 6–23)

## 2023-04-30 DIAGNOSIS — R10.9 GASTRIC PAIN: ICD-10-CM

## 2023-04-30 RX ORDER — SUCRALFATE 1 G/1
1 TABLET ORAL 4 TIMES DAILY
Qty: 120 TABLET | Refills: 0 | OUTPATIENT
Start: 2023-04-30

## 2023-05-23 ENCOUNTER — OFFICE VISIT (OUTPATIENT)
Dept: GASTROENTEROLOGY | Age: 48
End: 2023-05-23
Payer: COMMERCIAL

## 2023-05-23 ENCOUNTER — PREP FOR PROCEDURE (OUTPATIENT)
Dept: GASTROENTEROLOGY | Age: 48
End: 2023-05-23

## 2023-05-23 VITALS
HEART RATE: 74 BPM | DIASTOLIC BLOOD PRESSURE: 60 MMHG | OXYGEN SATURATION: 99 % | BODY MASS INDEX: 28.25 KG/M2 | TEMPERATURE: 96.8 F | WEIGHT: 180 LBS | SYSTOLIC BLOOD PRESSURE: 121 MMHG | HEIGHT: 67 IN

## 2023-05-23 DIAGNOSIS — Z12.11 ENCOUNTER FOR SCREENING COLONOSCOPY: ICD-10-CM

## 2023-05-23 DIAGNOSIS — K59.03 DRUG-INDUCED CONSTIPATION: ICD-10-CM

## 2023-05-23 DIAGNOSIS — K21.9 GASTROESOPHAGEAL REFLUX DISEASE, UNSPECIFIED WHETHER ESOPHAGITIS PRESENT: Primary | ICD-10-CM

## 2023-05-23 PROCEDURE — 99204 OFFICE O/P NEW MOD 45 MIN: CPT | Performed by: PHYSICIAN ASSISTANT

## 2023-05-23 RX ORDER — SODIUM CHLORIDE 9 MG/ML
25 INJECTION, SOLUTION INTRAVENOUS PRN
Status: CANCELLED | OUTPATIENT
Start: 2023-05-23

## 2023-05-23 RX ORDER — OMEPRAZOLE 40 MG/1
40 CAPSULE, DELAYED RELEASE ORAL 2 TIMES DAILY
Qty: 60 CAPSULE | Refills: 2 | Status: SHIPPED | OUTPATIENT
Start: 2023-05-23

## 2023-05-23 RX ORDER — SODIUM CHLORIDE 0.9 % (FLUSH) 0.9 %
5-40 SYRINGE (ML) INJECTION PRN
Status: CANCELLED | OUTPATIENT
Start: 2023-05-23

## 2023-05-23 RX ORDER — SODIUM CHLORIDE 0.9 % (FLUSH) 0.9 %
5-40 SYRINGE (ML) INJECTION EVERY 12 HOURS SCHEDULED
Status: CANCELLED | OUTPATIENT
Start: 2023-05-23

## 2023-05-23 RX ORDER — BISACODYL 5 MG/1
5 TABLET, DELAYED RELEASE ORAL SEE ADMIN INSTRUCTIONS
Qty: 8 TABLET | Refills: 0 | Status: SHIPPED | OUTPATIENT
Start: 2023-05-23

## 2023-05-23 RX ORDER — POLYETHYLENE GLYCOL 3350 17 G/17G
238 POWDER, FOR SOLUTION ORAL ONCE
Qty: 238 G | Refills: 0 | Status: SHIPPED | OUTPATIENT
Start: 2023-05-23 | End: 2023-05-23

## 2023-05-23 NOTE — ASSESSMENT & PLAN NOTE
Fam hx positive for colon cancer in father, diagnosed age 48. She has some mild constipation and dark stools which may be related to PO iron supplementation and Pepto-Bismol use. Schedule screening colonoscopy.

## 2023-05-23 NOTE — ASSESSMENT & PLAN NOTE
Takes Norco and PO iron supplementation which likely contribute. Take Miralax 1 cap daily for constipation and titrate up or down as needed until having regular daily bowel movements. Add Citrucel once having regular bowel movements. Drink plenty of water.

## 2023-05-23 NOTE — ASSESSMENT & PLAN NOTE
Counseled patient and provided written recommendations for diet and lifestyle modifications for GERD; she seems hesitant to initiate these changes. Avoid tobacco and alcohol. Discontinue NSAID use - she takes these frequently for back pain and headaches. Follow-up with PCP/pain management/spine specialist to discuss alternatives. Increase Omeprazole to BID and recommend she take Carafate 30 minutes prior to meals and QHS as prescribed. Schedule EGD evaluation with dilation as needed given hx of esophageal dysphagia.

## 2023-05-23 NOTE — PATIENT INSTRUCTIONS
Take Miralax 1 cap daily for constipation and titrate up or down as needed until having regular daily bowel movements. Add Citrucel once having regular bowel movements. Make sure you are well hydrated. Eat smaller and more frequent meals. Avoid lying down for 3 hours after eating. Elevate the head of the bed by 6 inches. Avoid wearing tight-fitting clothing. Stop smoking and avoid alcohol. Consider weight loss to get to a healthy weight, which is often critical to eliminating symptoms of reflux. Avoid foods and acid-containing beverages that can exacerbate the symptoms of reflux.  This includes:     -Caffeine  -Chocolate  -Peppermint  -Alcohol (especially red wine)  -Carbonated beverages  -Citrus fruits  -Tomato-based products  -Vinegar

## 2023-06-01 ENCOUNTER — OFFICE VISIT (OUTPATIENT)
Dept: OCCUPATIONAL MEDICINE | Age: 48
End: 2023-06-01

## 2023-06-01 DIAGNOSIS — B37.9 ANTIBIOTIC-INDUCED YEAST INFECTION: ICD-10-CM

## 2023-06-01 DIAGNOSIS — N30.00 ACUTE CYSTITIS WITHOUT HEMATURIA: Primary | ICD-10-CM

## 2023-06-01 DIAGNOSIS — T36.95XA ANTIBIOTIC-INDUCED YEAST INFECTION: ICD-10-CM

## 2023-06-01 DIAGNOSIS — R30.0 DYSURIA: ICD-10-CM

## 2023-06-01 LAB
BILIRUBIN, URINE, POC: NEGATIVE
BLOOD URINE, POC: ABNORMAL
GLUCOSE URINE, POC: NEGATIVE
KETONES, URINE, POC: NEGATIVE
LEUKOCYTE ESTERASE, URINE, POC: ABNORMAL
NITRITE, URINE, POC: NEGATIVE
PH, URINE, POC: 5 (ref 4.6–8)
PROTEIN,URINE, POC: NEGATIVE
SPECIFIC GRAVITY, URINE, POC: 1.02 (ref 1–1.03)
URINALYSIS CLARITY, POC: CLEAR
URINALYSIS COLOR, POC: YELLOW
UROBILINOGEN, POC: NORMAL

## 2023-06-01 RX ORDER — SULFAMETHOXAZOLE AND TRIMETHOPRIM 800; 160 MG/1; MG/1
1 TABLET ORAL 2 TIMES DAILY
Qty: 14 TABLET | Refills: 0 | Status: SHIPPED | OUTPATIENT
Start: 2023-06-01 | End: 2023-06-08

## 2023-06-01 RX ORDER — FLUCONAZOLE 150 MG/1
TABLET ORAL
Qty: 2 TABLET | Refills: 0 | Status: SHIPPED | OUTPATIENT
Start: 2023-06-01

## 2023-06-01 ASSESSMENT — ENCOUNTER SYMPTOMS
RESPIRATORY NEGATIVE: 1
EYES NEGATIVE: 1
GASTROINTESTINAL NEGATIVE: 1
ALLERGIC/IMMUNOLOGIC NEGATIVE: 1

## 2023-06-01 NOTE — PROGRESS NOTES
PROGRESS NOTE    SUBJECTIVE:     Emerita Wilder is a 56 y.o. female seen for:    Chief Complaint    Dysuria       Urinary Tract Infection  This is a new problem. The current episode started in the past 7 days. The problem is unchanged. Associated symptoms include pain. Pertinent negatives include no hematuria. The pain is present in the suprapubic region. Denies fevers, chills, nausea, vomiting, flank pain, abnormal discharge, itching, and/or new sexual partners within the last 90 days. Patient took an old prescription of Bactrim (4 pills total). She last took a pill on Saturday. Patient tolerates this medication well. Patient requests Diflucan to be given along with antibiotic if she develops a yeast infection. No other questions or concerns. Current Outpatient Medications   Medication Sig Dispense Refill    sulfamethoxazole-trimethoprim (BACTRIM DS;SEPTRA DS) 800-160 MG per tablet Take 1 tablet by mouth 2 times daily for 7 days 14 tablet 0    fluconazole (DIFLUCAN) 150 MG tablet Take 1 tablet by mouth once, then take 1 tablet by mouth 72 hours later if symptoms persist. 2 tablet 0    bisacodyl 5 MG EC tablet Take 1 tablet by mouth See Admin Instructions Take four tablets at 1200 (noon) and another 4 tablets at 6 pm the day before your colonoscopy. 8 tablet 0    omeprazole (PRILOSEC) 40 MG delayed release capsule Take 1 capsule by mouth in the morning and at bedtime 60 capsule 2    montelukast (SINGULAIR) 10 MG tablet Take 1 tablet by mouth daily 30 tablet 3    sucralfate (CARAFATE) 1 GM tablet Take 1 tablet by mouth 4 times daily Take 1 hour before meals and bedtime.  120 tablet 0    methocarbamol (ROBAXIN) 750 MG tablet Take 1 tablet by mouth nightly as needed      fluticasone (FLONASE) 50 MCG/ACT nasal spray 2 sprays by Nasal route daily 16 g 2    Baclofen (LIORESAL) 5 MG tablet TAKE 1 TABLET BY MOUTH THREE TIMES A DAY AS NEEDED FOR 30 DAYS      loratadine (CLARITIN) 10 MG capsule Take 1 tablet by

## 2023-06-20 ENCOUNTER — ANESTHESIA EVENT (OUTPATIENT)
Dept: ENDOSCOPY | Age: 48
End: 2023-06-20
Payer: COMMERCIAL

## 2023-06-20 RX ORDER — MIDAZOLAM HYDROCHLORIDE 2 MG/2ML
2 INJECTION, SOLUTION INTRAMUSCULAR; INTRAVENOUS
Status: CANCELLED | OUTPATIENT
Start: 2023-06-20 | End: 2023-06-21

## 2023-06-20 RX ORDER — SODIUM CHLORIDE 0.9 % (FLUSH) 0.9 %
5-40 SYRINGE (ML) INJECTION PRN
Status: CANCELLED | OUTPATIENT
Start: 2023-06-20

## 2023-06-20 RX ORDER — DIPHENHYDRAMINE HYDROCHLORIDE 50 MG/ML
12.5 INJECTION INTRAMUSCULAR; INTRAVENOUS
Status: CANCELLED | OUTPATIENT
Start: 2023-06-20 | End: 2023-06-21

## 2023-06-20 RX ORDER — SODIUM CHLORIDE 9 MG/ML
INJECTION, SOLUTION INTRAVENOUS PRN
Status: CANCELLED | OUTPATIENT
Start: 2023-06-20

## 2023-06-20 RX ORDER — FENTANYL CITRATE 50 UG/ML
100 INJECTION, SOLUTION INTRAMUSCULAR; INTRAVENOUS
Status: CANCELLED | OUTPATIENT
Start: 2023-06-20 | End: 2023-06-21

## 2023-06-20 RX ORDER — ACETAMINOPHEN 500 MG
1000 TABLET ORAL ONCE
Status: CANCELLED | OUTPATIENT
Start: 2023-06-20 | End: 2023-06-20

## 2023-06-20 RX ORDER — SODIUM CHLORIDE 0.9 % (FLUSH) 0.9 %
5-40 SYRINGE (ML) INJECTION EVERY 12 HOURS SCHEDULED
Status: CANCELLED | OUTPATIENT
Start: 2023-06-20

## 2023-06-20 RX ORDER — OXYCODONE HYDROCHLORIDE 5 MG/1
5 TABLET ORAL
Status: CANCELLED | OUTPATIENT
Start: 2023-06-20 | End: 2023-06-21

## 2023-06-20 RX ORDER — ONDANSETRON 2 MG/ML
4 INJECTION INTRAMUSCULAR; INTRAVENOUS
Status: CANCELLED | OUTPATIENT
Start: 2023-06-20 | End: 2023-06-21

## 2023-06-21 ENCOUNTER — ANESTHESIA (OUTPATIENT)
Dept: ENDOSCOPY | Age: 48
End: 2023-06-21
Payer: COMMERCIAL

## 2023-06-21 ENCOUNTER — HOSPITAL ENCOUNTER (OUTPATIENT)
Age: 48
Setting detail: OUTPATIENT SURGERY
Discharge: HOME | End: 2023-06-21
Attending: INTERNAL MEDICINE | Admitting: INTERNAL MEDICINE
Payer: COMMERCIAL

## 2023-06-21 VITALS
SYSTOLIC BLOOD PRESSURE: 99 MMHG | HEART RATE: 68 BPM | DIASTOLIC BLOOD PRESSURE: 60 MMHG | HEIGHT: 67 IN | OXYGEN SATURATION: 98 % | RESPIRATION RATE: 16 BRPM | TEMPERATURE: 98.6 F | WEIGHT: 177.03 LBS | BODY MASS INDEX: 27.79 KG/M2

## 2023-06-21 PROCEDURE — 3609027000 HC COLONOSCOPY: Performed by: INTERNAL MEDICINE

## 2023-06-21 PROCEDURE — 3609017700 HC EGD DILATION GASTRIC/DUODENAL STRICTURE: Performed by: INTERNAL MEDICINE

## 2023-06-21 PROCEDURE — 3700000000 HC ANESTHESIA ATTENDED CARE: Performed by: INTERNAL MEDICINE

## 2023-06-21 PROCEDURE — 6360000002 HC RX W HCPCS: Performed by: NURSE ANESTHETIST, CERTIFIED REGISTERED

## 2023-06-21 PROCEDURE — 88305 TISSUE EXAM BY PATHOLOGIST: CPT

## 2023-06-21 PROCEDURE — 2500000003 HC RX 250 WO HCPCS: Performed by: NURSE ANESTHETIST, CERTIFIED REGISTERED

## 2023-06-21 PROCEDURE — 3700000001 HC ADD 15 MINUTES (ANESTHESIA): Performed by: INTERNAL MEDICINE

## 2023-06-21 PROCEDURE — 88312 SPECIAL STAINS GROUP 1: CPT

## 2023-06-21 PROCEDURE — 2580000003 HC RX 258: Performed by: NURSE ANESTHETIST, CERTIFIED REGISTERED

## 2023-06-21 PROCEDURE — 2709999900 HC NON-CHARGEABLE SUPPLY: Performed by: INTERNAL MEDICINE

## 2023-06-21 PROCEDURE — 7100000011 HC PHASE II RECOVERY - ADDTL 15 MIN: Performed by: INTERNAL MEDICINE

## 2023-06-21 PROCEDURE — C1726 CATH, BAL DIL, NON-VASCULAR: HCPCS | Performed by: INTERNAL MEDICINE

## 2023-06-21 PROCEDURE — 7100000010 HC PHASE II RECOVERY - FIRST 15 MIN: Performed by: INTERNAL MEDICINE

## 2023-06-21 PROCEDURE — 2580000003 HC RX 258: Performed by: ANESTHESIOLOGY

## 2023-06-21 RX ORDER — PROPOFOL 10 MG/ML
INJECTION, EMULSION INTRAVENOUS CONTINUOUS PRN
Status: DISCONTINUED | OUTPATIENT
Start: 2023-06-21 | End: 2023-06-21 | Stop reason: SDUPTHER

## 2023-06-21 RX ORDER — SODIUM CHLORIDE, SODIUM LACTATE, POTASSIUM CHLORIDE, CALCIUM CHLORIDE 600; 310; 30; 20 MG/100ML; MG/100ML; MG/100ML; MG/100ML
INJECTION, SOLUTION INTRAVENOUS CONTINUOUS PRN
Status: DISCONTINUED | OUTPATIENT
Start: 2023-06-21 | End: 2023-06-21 | Stop reason: SDUPTHER

## 2023-06-21 RX ORDER — DEXMEDETOMIDINE HYDROCHLORIDE 100 UG/ML
INJECTION, SOLUTION INTRAVENOUS PRN
Status: DISCONTINUED | OUTPATIENT
Start: 2023-06-21 | End: 2023-06-21 | Stop reason: SDUPTHER

## 2023-06-21 RX ORDER — SODIUM CHLORIDE, SODIUM LACTATE, POTASSIUM CHLORIDE, CALCIUM CHLORIDE 600; 310; 30; 20 MG/100ML; MG/100ML; MG/100ML; MG/100ML
INJECTION, SOLUTION INTRAVENOUS CONTINUOUS
Status: DISCONTINUED | OUTPATIENT
Start: 2023-06-21 | End: 2023-06-21 | Stop reason: HOSPADM

## 2023-06-21 RX ADMIN — SODIUM CHLORIDE, SODIUM LACTATE, POTASSIUM CHLORIDE, AND CALCIUM CHLORIDE: 600; 310; 30; 20 INJECTION, SOLUTION INTRAVENOUS at 07:00

## 2023-06-21 RX ADMIN — DEXMEDETOMIDINE 0.6 MCG: 100 INJECTION, SOLUTION, CONCENTRATE INTRAVENOUS at 07:18

## 2023-06-21 RX ADMIN — SODIUM CHLORIDE, POTASSIUM CHLORIDE, SODIUM LACTATE AND CALCIUM CHLORIDE: 600; 310; 30; 20 INJECTION, SOLUTION INTRAVENOUS at 06:15

## 2023-06-21 RX ADMIN — DEXMEDETOMIDINE 0.3 MCG: 100 INJECTION, SOLUTION, CONCENTRATE INTRAVENOUS at 07:00

## 2023-06-21 RX ADMIN — PROPOFOL 50 MCG/KG/MIN: 10 INJECTION, EMULSION INTRAVENOUS at 07:00

## 2023-06-21 ASSESSMENT — PAIN - FUNCTIONAL ASSESSMENT: PAIN_FUNCTIONAL_ASSESSMENT: 0-10

## 2023-06-21 NOTE — DISCHARGE INSTRUCTIONS
Gastrointestinal Esophagogastroduodenoscopy (EGD) - Upper Exam Discharge Instructions    1. Call Dr. Mata Juarez at [phone] for any problems or questions. 2. Contact the doctor's office for follow up appointment as directed. 3. Medication may cause drowsiness for several hours, therefore:  Do not drive or operate machinery for remainder of the day. No alcohol today. Do not make any important or legal decisions for 24 hours. Do not sign any legal documents for 24 hours. 5. Ordinarily, you may resume regular diet and activity after exam unless otherwise specified by your physician. 6. For mild soreness in your throat you may use Cepacol throat lozenges or warm salt-water gargles as needed. Gastrointestinal Colonoscopy/Flexible Sigmoidoscopy - Lower Exam Discharge Instructions    Because of air put into your colon during exam, you may experience some abdominal distension, relieved by the passage of gas, for several hours. Contact your physician if you have any of the following:  Excessive amount of bleeding - large amount when having a bowel movement. Occasional specks of blood with bowel movement would not be unusual.  Severe abdominal pain  Fever or Chills    Any additional instructions: Follow up with office on Monday for pathology results.

## 2023-06-21 NOTE — PROCEDURES
Endoscopy Note          Operative Report    Patient: Saskia Enciso MRN: [id number]      YOB: 1975  Age: 56 y.o. Sex: female            Indications:   Chronic reflux disease. Esophageal dysphagia    Preoperative Evaluation: The patient was evaluated prior to the procedure in the GI lab admission area, the patient ASA was recorded . Consent was obtained from the patient with the risk of perforation bleeding and aspiration. Anesthesia: JOVANY-per anesthesia    Findings: Normal esophageal mucosa with no sign of tight stricture. Normal GE junction. Secondary symptomatic dysphagia the whole lumen was dilated using 20 mm through-the-scope balloon. Mild erosive generalized chronic gastritis. Biopsy from the antrum was taken. Open pylorus. Normal descending duodenal mucosa    Postoperative Diagnosis: 1-chronic gastritis.   2-reflux disease with possibility of secondary dyskinesia narcotics related    18931 Esophagogastroduodenoscopy, Flexible, transoral; biopsy; single or multiple and 70134 Esophagogastroduodenoscopy, Flexible, transoral; transendoscopic balloon dilation of esophagus      Recommendations: Await Pathology      Signed By:  Jim Hawkins MD     June 21, 2023

## 2023-06-21 NOTE — ANESTHESIA PRE PROCEDURE
Department of Anesthesiology  Preprocedure Note       Name:  Saskia Enciso   Age:  56 y.o.  :  1975                                          MRN:  [id number]         Date:  2023      Surgeon: Lolly Kasper):  Jim Hawkins MD    Procedure: Procedure(s):  COLORECTAL CANCER SCREENING, NOT HIGH RISK  EGD ESOPHAGOGASTRODUODENOSCOPY    Medications prior to admission:   Prior to Admission medications    Medication Sig Start Date End Date Taking? Authorizing Provider   Multiple Vitamins-Minerals (MULTIVITAMIN ADULT, MINERALS,) TABS Take by mouth daily   Yes Historical Provider, MD   fluconazole (DIFLUCAN) 150 MG tablet Take 1 tablet by mouth once, then take 1 tablet by mouth 72 hours later if symptoms persist.  Patient not taking: Reported on 6/15/2023 6/1/23   KARMA Vegas CNP   bisacodyl 5 MG EC tablet Take 1 tablet by mouth See Admin Instructions Take four tablets at 1200 (noon) and another 4 tablets at 6 pm the day before your colonoscopy. Patient not taking: Reported on 6/15/2023 5/23/23   Jared Onofre PA-C   omeprazole (PRILOSEC) 40 MG delayed release capsule Take 1 capsule by mouth in the morning and at bedtime 23   Melissa R Grinnell, PA-C   montelukast (SINGULAIR) 10 MG tablet Take 1 tablet by mouth daily  Patient taking differently: Take 1 tablet by mouth nightly 23   KARMA Solorzano CNP   sucralfate (CARAFATE) 1 GM tablet Take 1 tablet by mouth 4 times daily Take 1 hour before meals and bedtime. 23   KARMA Solorzano CNP   methocarbamol (ROBAXIN) 750 MG tablet Take 1 tablet by mouth nightly as needed 23   Historical Provider, MD   fluticasone Rosemarie Mendy) 50 MCG/ACT nasal spray 2 sprays by Nasal route daily 23   KARMA Vegas CNP   Baclofen (LIORESAL) 5 MG tablet TAKE 1 TABLET BY MOUTH THREE TIMES A DAY AS NEEDED FOR 30 DAYS 22   Historical Provider, MD   loratadine (CLARITIN) 10 MG capsule Take 1 tablet by mouth daily.   Patient taking

## 2023-06-21 NOTE — INTERVAL H&P NOTE
Update History & Physical    The patient's History and Physical of May 23,  was reviewed with the patient and I examined the patient. There was no change. The surgical site was confirmed by the patient and me. Plan: The risks, benefits, expected outcome, and alternative to the recommended procedure have been discussed with the patient. Patient understands and wants to proceed with the procedure.      Electronically signed by Chayito Corcoran MD on 6/21/2023 at 6:38 AM

## 2023-06-21 NOTE — ANESTHESIA POSTPROCEDURE EVALUATION
Department of Anesthesiology  Postprocedure Note    Patient: Bryan Dickson  MRN: [id number]  YOB: 1975  Date of evaluation: 6/21/2023      Procedure Summary     Date: 06/21/23 Room / Location: [facility] ENDO 01 / [facility] ENDOSCOPY    Anesthesia Start: 0648 Anesthesia Stop: 8581    Procedures:       COLORECTAL CANCER SCREENING, NOT HIGH RISK      EGD DILATION BALLOON AND BIOPSY (Upper GI Region) Diagnosis:       Gastroesophageal reflux disease      Encounter for screening colonoscopy      Drug-induced constipation      (Gastroesophageal reflux disease [K21.9])      (Encounter for screening colonoscopy [Z12.11])      (Drug-induced constipation [K59.03])    Surgeons: Eder Fitzpatrick MD Responsible Provider: Rayna Roman MD    Anesthesia Type: TIVA ASA Status: 2          Anesthesia Type: No value filed.     Jose Luis Phase I:      Jose Luis Phase II: Jose Luis Score: 10      Anesthesia Post Evaluation    Patient location during evaluation: PACU  Patient participation: complete - patient participated  Level of consciousness: awake and alert  Airway patency: patent  Nausea & Vomiting: no nausea and no vomiting  Complications: no  Cardiovascular status: hemodynamically stable  Respiratory status: acceptable, nonlabored ventilation and spontaneous ventilation  Hydration status: euvolemic  Comments: BP 99/60   Pulse 68   Temp 98.6 °F (37 °C)   Resp 16   Ht 5' 7\" (1.702 m)   Wt 177 lb 0.5 oz (80.3 kg)   LMP 06/07/2023   SpO2 98%   BMI 27.73 kg/m²     Multimodal analgesia pain management approach

## 2023-06-21 NOTE — PROGRESS NOTES
Discharge instructions were reviewed with patient and S.O. Lisette. An opportunity was given for questions. Patient and S.O. verbalized understanding, and has no questions at this time.

## 2023-06-21 NOTE — PROCEDURES
Operative Report    Patient: Andrew Chandler MRN: [id number]      YOB: 1975  Age: 56 y.o. Sex: female            Indications: Screening colonoscopy. Family history of colon [email or web address]     Preoperative Evaluation: The patient was evaluated prior to the procedure in the GI lab admission area, the patient ASA was recorded . Consent was obtained from the patient with the risk of perforation bleeding and aspiration. Anesthesia: JOVANY-per anesthesia    Complications: None; patient tolerated the procedure well. EBL -insignificant      Procedure: The patient was sedated in the left lateral decubitus position. Scope was advaced from the rectum to the cecum. Evaluation was performed to the cecum twice. The scope was withdrawn to the rectum, retroflexed view was performed. The rectal exam was normal.  Preparation was good. [place] score of 3/3/3:9 . Findings:   Exam to the cecum. Very tortuous colon and scores with multiple sharp angulation. No sign of colon polyp noted throughout the exam.      Postoperative Diagnosis: Normal screening colonoscopy recommend repeat exam in 5 years    47829 Colonoscopy, Flexible; diagnostic       Recommendations:   - Repeat colonoscopy in 5 years.       Signed By:  Steven Crawford MD     June 21, 2023

## 2023-06-22 ENCOUNTER — OFFICE VISIT (OUTPATIENT)
Dept: OBGYN CLINIC | Age: 48
End: 2023-06-22
Payer: COMMERCIAL

## 2023-06-22 VITALS
WEIGHT: 179 LBS | DIASTOLIC BLOOD PRESSURE: 62 MMHG | BODY MASS INDEX: 27.13 KG/M2 | SYSTOLIC BLOOD PRESSURE: 110 MMHG | HEIGHT: 68 IN

## 2023-06-22 DIAGNOSIS — Z01.419 WELL WOMAN EXAM WITH ROUTINE GYNECOLOGICAL EXAM: Primary | ICD-10-CM

## 2023-06-22 DIAGNOSIS — Z11.51 ENCOUNTER FOR SCREENING FOR HUMAN PAPILLOMAVIRUS (HPV): ICD-10-CM

## 2023-06-22 DIAGNOSIS — Z12.4 CERVICAL CANCER SCREENING: ICD-10-CM

## 2023-06-22 DIAGNOSIS — N39.0 URINARY TRACT INFECTION WITHOUT HEMATURIA, SITE UNSPECIFIED: ICD-10-CM

## 2023-06-22 DIAGNOSIS — Z12.31 OTHER SCREENING MAMMOGRAM: ICD-10-CM

## 2023-06-22 PROBLEM — Z12.11 ENCOUNTER FOR SCREENING COLONOSCOPY: Status: RESOLVED | Noted: 2023-05-23 | Resolved: 2023-06-22

## 2023-06-22 PROCEDURE — 99396 PREV VISIT EST AGE 40-64: CPT | Performed by: OBSTETRICS & GYNECOLOGY

## 2023-06-22 RX ORDER — ACYCLOVIR 400 MG/1
TABLET ORAL
Qty: 180 TABLET | Refills: 4 | Status: SHIPPED | OUTPATIENT
Start: 2023-06-22

## 2023-06-22 ASSESSMENT — ENCOUNTER SYMPTOMS
RESPIRATORY NEGATIVE: 1
GASTROINTESTINAL NEGATIVE: 1
EYES NEGATIVE: 1
ALLERGIC/IMMUNOLOGIC NEGATIVE: 1

## 2023-06-22 NOTE — PROGRESS NOTES
Name: Bryan Dickson  Date: 2023  YOB: 1975  LMP: Patient's last menstrual period was 2023. Bi Pickard is a 56 y.o.   who is here for a annual exam. Recent uti. Birth control: BTL  Menstrual status: regular cycles  Gyn Surgery: s/p BTL    Health Maintenance:  Pap Smear: last pap in  22  wnl  HPV vaccine: not done  If 40 or older, Mammo: last mammo in 22, pathology negative  If 39 or older, Colonoscopy:  yesterday  If postmenopausal, Dexa scan: not needed    Review of Systems   Constitutional: Negative. HENT: Negative. Eyes: Negative. Respiratory: Negative. Cardiovascular: Negative. Gastrointestinal: Negative. Endocrine: Negative. Genitourinary: Negative. Musculoskeletal: Negative. Skin: Negative. Allergic/Immunologic: Negative. Hematological: Negative. Psychiatric/Behavioral: Negative. Negative for self-injury and suicidal ideas. Past Medical History:  No date: Acid reflux      Comment:  managed with meds  No date: Anemia  No date: Anxiety      Comment:  managed with meds  No date: Arthralgia of multiple sites  No date: Asthma      Comment:  inhaler as needed- lastuse 23  No date: [facility]      Comment:  managed with meds  No date: Chronic low back pain  No date: Chronic pain      Comment:  lower back  No date: Depressive disorder  No date: Drug induced constipation  No date: Genital herpes  No date:  Insomnia  No date: MRSA (methicillin resistant staph aureus) culture positive      Comment:  on skin of abd-- 2015--- was tx---pt states just staph                not MRSA  No date: Multiple joint pain  No date: Spinal stenosis  No date: Stomach ulcer      Comment:  Hx     Past Surgical History:  No date: BACK SURGERY      Comment:  2017 no hardware  2023: COLONOSCOPY; N/A      Comment:  COLORECTAL CANCER SCREENING, NOT HIGH RISK performed by                Eder Fitzpatrick MD at [address] [place] [address]  No date:

## 2023-06-25 LAB
BACTERIA SPEC CULT: NORMAL
SERVICE CMNT-IMP: NORMAL

## 2023-06-26 RX ORDER — ARIPIPRAZOLE 20 MG/1
TABLET ORAL
Qty: 90 TABLET | Refills: 1 | OUTPATIENT
Start: 2023-06-26

## 2023-06-26 RX ORDER — QUETIAPINE FUMARATE 300 MG/1
TABLET, FILM COATED ORAL
Qty: 45 TABLET | Refills: 1 | OUTPATIENT
Start: 2023-06-26

## 2023-06-29 ENCOUNTER — TELEPHONE (OUTPATIENT)
Dept: GASTROENTEROLOGY | Age: 48
End: 2023-06-29

## 2023-07-07 LAB
CYTOLOGIST CVX/VAG CYTO: ABNORMAL
CYTOLOGY CVX/VAG DOC THIN PREP: ABNORMAL
HPV APTIMA: NEGATIVE
Lab: ABNORMAL
PATH REPORT.FINAL DX SPEC: ABNORMAL
PATHOLOGIST CVX/VAG CYTO: ABNORMAL
PATHOLOGIST PROVIDED ICD: ABNORMAL
RECOM F/U CVX/VAG CYTO: ABNORMAL
STAT OF ADQ CVX/VAG CYTO-IMP: ABNORMAL

## 2023-07-12 ENCOUNTER — TELEPHONE (OUTPATIENT)
Dept: OBGYN CLINIC | Age: 48
End: 2023-07-12

## 2023-07-12 NOTE — TELEPHONE ENCOUNTER
Called and informed patient of normal results    ----- Message from Janelle Hutchinson MD sent at 7/12/2023  2:41 PM EDT -----  Please notify patient of normal results.

## 2023-07-15 DIAGNOSIS — J45.909 ASTHMA DUE TO SEASONAL ALLERGIES: ICD-10-CM

## 2023-07-17 RX ORDER — MONTELUKAST SODIUM 10 MG/1
TABLET ORAL
Qty: 90 TABLET | Refills: 1 | OUTPATIENT
Start: 2023-07-17

## 2023-08-21 ENCOUNTER — TELEPHONE (OUTPATIENT)
Dept: GASTROENTEROLOGY | Age: 48
End: 2023-08-21

## 2023-08-21 ENCOUNTER — OFFICE VISIT (OUTPATIENT)
Dept: GASTROENTEROLOGY | Age: 48
End: 2023-08-21
Payer: COMMERCIAL

## 2023-08-21 VITALS
WEIGHT: 178 LBS | BODY MASS INDEX: 26.98 KG/M2 | HEIGHT: 68 IN | OXYGEN SATURATION: 94 % | SYSTOLIC BLOOD PRESSURE: 116 MMHG | DIASTOLIC BLOOD PRESSURE: 73 MMHG | TEMPERATURE: 97.3 F | HEART RATE: 76 BPM | RESPIRATION RATE: 16 BRPM

## 2023-08-21 DIAGNOSIS — B37.9 YEAST INFECTION: Primary | ICD-10-CM

## 2023-08-21 DIAGNOSIS — K29.70 HELICOBACTER PYLORI GASTRITIS: ICD-10-CM

## 2023-08-21 DIAGNOSIS — R13.19 ESOPHAGEAL DYSPHAGIA: ICD-10-CM

## 2023-08-21 DIAGNOSIS — K59.03 DRUG-INDUCED CONSTIPATION: Primary | ICD-10-CM

## 2023-08-21 DIAGNOSIS — B96.81 HELICOBACTER PYLORI GASTRITIS: ICD-10-CM

## 2023-08-21 PROBLEM — A04.8 H. PYLORI INFECTION: Status: ACTIVE | Noted: 2023-08-21

## 2023-08-21 PROCEDURE — 99214 OFFICE O/P EST MOD 30 MIN: CPT | Performed by: PHYSICIAN ASSISTANT

## 2023-08-21 RX ORDER — FLUCONAZOLE 150 MG/1
150 TABLET ORAL ONCE
Qty: 1 TABLET | Refills: 0 | Status: SHIPPED | OUTPATIENT
Start: 2023-08-21 | End: 2023-08-21 | Stop reason: DRUGHIGH

## 2023-08-21 RX ORDER — AMOXICILLIN 500 MG/1
1000 CAPSULE ORAL 2 TIMES DAILY
Qty: 56 CAPSULE | Refills: 0 | Status: SHIPPED | OUTPATIENT
Start: 2023-08-21 | End: 2023-09-04

## 2023-08-21 RX ORDER — CLARITHROMYCIN 500 MG/1
500 TABLET, COATED ORAL 2 TIMES DAILY
Qty: 28 TABLET | Refills: 0 | Status: SHIPPED | OUTPATIENT
Start: 2023-08-21 | End: 2023-09-04

## 2023-08-21 RX ORDER — FLUCONAZOLE 200 MG/1
200 TABLET ORAL ONCE
Qty: 1 TABLET | Refills: 0 | Status: SHIPPED | OUTPATIENT
Start: 2023-08-21 | End: 2023-08-21

## 2023-08-21 RX ORDER — PANTOPRAZOLE SODIUM 40 MG/1
40 TABLET, DELAYED RELEASE ORAL
Qty: 60 TABLET | Refills: 2 | Status: SHIPPED | OUTPATIENT
Start: 2023-08-21 | End: 2023-08-21 | Stop reason: CLARIF

## 2023-08-21 NOTE — TELEPHONE ENCOUNTER
Informed patient that Diflucan 200 mg 1 dose has been sent to [facility] on file per patient request. Patient verbalized understanding.

## 2023-08-21 NOTE — ASSESSMENT & PLAN NOTE
----- Message from Rajat Segovia MD sent at 11/30/2019  5:54 PM CST -----  Needs to be seen   Continue PPI BID as previously prescribed as part of triple therapy regimen. Check stool H.pylori to confirm eradication after treatment completed.  After 8 weeks, consider PPI de-escalation via decreased dose, transition to H2 blocker, PRN use, etc.

## 2023-08-21 NOTE — ASSESSMENT & PLAN NOTE
Noted at [address] [place] [address] 5/23 with no evidence of stricture on EGD 6/21. Whole lumen was dilated secondary to symptomatic dysphagia, which was attributed to dyskinesia secondary to chronic narcotic use.

## 2023-08-21 NOTE — TELEPHONE ENCOUNTER
Patient call and left a message asking if Yessy could order Diflucan  with the antibiotics,  sent the request to [facility] and waiting on response

## 2023-08-21 NOTE — PROGRESS NOTES
in my office 5/23 for screening colonoscopy and symptoms of GERD with dysphagia. Omeprazole 40 mg QD was increased to BID dosing and patient was counseled to take Carafate as prescribed. She also complained of chronic constipation, likely exacerbated by chronic narcotic use as well as PO iron supplementation. Family hx was notable for colon cancer. EGD and colonoscopy were performed 6/21 by Dr. Ami Ochoa; this was notable for chronic gastritis and no evidence of stricture. Based on symptoms dysphagia, the entire lumen was dilated. [place] score was 9 with tortuous colon and multiple sharp angulations but no polyps. Recommendation was to repeat colonoscopy in 5 years. Dysphagia was attributed to dyskinesia secondary to narcotic use. Patient presents today for routine follow-up. Patient reports she's overall doing well. She reports ongoing constipation with BM 3x weekly, occasionally hard. Denies hematochezia but stools are occasionally black. She is still having some occasional nausea, reflux, and still having some solid food dysphagia - counseled on indications for ED presentation if unable to swallow or expectorate food bolus. She takes Norco as needed, typically once QOD. She's followed by pain management and has been unsuccessful with non-narcotic options. She admits to taking Miralax QOD and has a hard time remembering to take this daily.     Past Medical History:   Diagnosis Date    Acid reflux     managed with meds    Anemia     Anxiety     managed with meds    Arthralgia of multiple sites     Asthma     inhaler as needed- lastuse 6/7/23    Bipolar 1 disorder (HCC)     managed with meds    Chronic low back pain     Chronic pain     lower back    Depressive disorder     Drug induced constipation     Genital herpes     Insomnia     MRSA (methicillin resistant staph aureus) culture positive     on skin of abd-- 2015--- was tx---pt states just staph not MRSA    Multiple joint pain     Spinal stenosis

## 2023-08-21 NOTE — ASSESSMENT & PLAN NOTE
Colonoscopy 6/21 without polyps. Counseled on basic constipation measures: Take Miralax 1 cap daily for constipation and titrate up or down as needed until having regular daily bowel movements. Increase daily fiber intake to 20-30 grams daily either by dietary intake or an over-the-counter supplement such as Citrucel. Limit alcohol and fatty food intake. Drink at least 80 oz water daily or more as needed to maintain adequate hydration. Exercise regularly and consider weight loss if appropriate based on your current weight. Avoid straining or lingering on the toilet longer than necessary. Review your medication list and discuss with your PCP whether any of these medications may exacerbate constipation.

## 2023-10-11 ENCOUNTER — TELEPHONE (OUTPATIENT)
Dept: GASTROENTEROLOGY | Age: 48
End: 2023-10-11

## 2023-10-11 NOTE — TELEPHONE ENCOUNTER
Called patient to schedule a follow up appointment for 10/27 @ 9:15. Asked patient to hold PPI x 2 weeks then submit stool sample for H.pylor to confirm eradication. Patient verbalized agreement/ understanding.

## 2023-11-06 ENCOUNTER — OFFICE VISIT (OUTPATIENT)
Dept: GASTROENTEROLOGY | Age: 48
End: 2023-11-06
Payer: COMMERCIAL

## 2023-11-06 VITALS
DIASTOLIC BLOOD PRESSURE: 73 MMHG | TEMPERATURE: 98.4 F | WEIGHT: 178 LBS | HEART RATE: 73 BPM | BODY MASS INDEX: 26.98 KG/M2 | RESPIRATION RATE: 16 BRPM | HEIGHT: 68 IN | SYSTOLIC BLOOD PRESSURE: 110 MMHG | OXYGEN SATURATION: 98 %

## 2023-11-06 DIAGNOSIS — K21.9 GASTROESOPHAGEAL REFLUX DISEASE WITHOUT ESOPHAGITIS: ICD-10-CM

## 2023-11-06 DIAGNOSIS — K29.70 HELICOBACTER PYLORI GASTRITIS: Primary | ICD-10-CM

## 2023-11-06 DIAGNOSIS — B96.81 HELICOBACTER PYLORI GASTRITIS: Primary | ICD-10-CM

## 2023-11-06 DIAGNOSIS — K59.03 DRUG-INDUCED CONSTIPATION: ICD-10-CM

## 2023-11-06 PROCEDURE — 99214 OFFICE O/P EST MOD 30 MIN: CPT | Performed by: PHYSICIAN ASSISTANT

## 2023-11-06 RX ORDER — OMEPRAZOLE 40 MG/1
40 CAPSULE, DELAYED RELEASE ORAL DAILY
Qty: 90 CAPSULE | Refills: 0 | Status: SHIPPED | OUTPATIENT
Start: 2023-11-06

## 2023-11-06 NOTE — PROGRESS NOTES
Isabel Andrade (:  1975) is a 53 y.o. female new patient referred to our office for evaluation of the following chief complaint(s):  Follow-up (H-Pylori)           ASSESSMENT/PLAN:  1. Helicobacter pylori gastritis  2. Gastroesophageal reflux disease without esophagitis  -     omeprazole (PRILOSEC) 40 MG delayed release capsule; Take 1 capsule by mouth daily, Disp-90 capsule, R-0Normal  3. Drug-induced constipation    Noted on EGD 23, s/p triple therapy, confirmed eradication with negative H.pylori stool Ag 10/23/23. She admits she is not following GERD diet. Counseled patient and provided written recommendations for diet and lifestyle modifications for GERD. Avoid tobacco, alcohol, NSAIDs. Transition to Omeprazole 40 mg QD. Also counseled on basic constipation measures. I think her constipation would improve with attention to increased hydration, adequate fiber intake, and daily Miralax use. She can follow-up as needed for any worsening symptoms. Angelita Heath is a 53y.o. year old female who has been followed for symptoms of GERD with dysphagia. She also has hx of chronic constipation associated with chronic narcotic use and PO iron supplement. Family hx positive for colon cancer. Evaluation to date has included:    -EGD 23 (Dr. Maria Del Rosario Diamond): Chronic gastritis, empiric esophageal dilation; dysphagia felt to be related to dyskinesia from chronic narcotic use; antrum biopsies showed chronic active gastritis, positive H.pylori  -Colonoscopy 23 (Dr. Maria Del Rosario Diamond): [place] score 9, tortuous colon, multiple sharp angulations, no polyps; recommended recall 5 years  -Triple therapy prescribed at [address] [place] [address] 23  -H.pylori stool Ag 10/23/23: negative    Patient presents today for routine follow-up. She is taking Prilosec 40 mg BID and has completed Carafate.   Patient reports she is still having some intermittent reflux which she admits to being related to eating - she

## 2023-11-06 NOTE — PATIENT INSTRUCTIONS
For reflux:   Eat smaller and more frequent meals. Avoid lying down for 3 hours after eating. Elevate the head of the bed by 6 inches. Avoid wearing tight-fitting clothing. Stop smoking and avoid alcohol. Avoid NSAID medications (Aspirin, Excedrin, Aleve, Ibuprofen, Goody Powder, Toradol, Mobic, Voltaren, etc). Consider weight loss to get to a healthy weight, which is often critical to eliminating symptoms of reflux. Avoid foods and acid-containing beverages that can exacerbate the symptoms of reflux. This includes:     -Caffeine  -Chocolate  -Peppermint  -Alcohol (especially red wine)  -Carbonated beverages  -Citrus fruits  -Tomato-based products  -Vinegar  -Spicy and greasy foods    For constipation:  Take Miralax 1 cap daily for constipation and titrate up or down as needed until having regular daily bowel movements. Increase daily fiber intake to 30 grams daily either by dietary intake or an over-the-counter supplement such as Citrucel. Limit alcohol and fatty food intake. Drink at least 80 oz water daily or more as needed to maintain adequate hydration. Exercise regularly and consider weight loss if appropriate based on your current weight. Avoid straining or lingering on the toilet longer than necessary. Review your medication list and discuss with your PCP whether any of these medications may exacerbate constipation.

## 2024-01-15 ENCOUNTER — OFFICE VISIT (OUTPATIENT)
Dept: OBGYN CLINIC | Age: 49
End: 2024-01-15
Payer: COMMERCIAL

## 2024-01-15 VITALS
DIASTOLIC BLOOD PRESSURE: 72 MMHG | SYSTOLIC BLOOD PRESSURE: 120 MMHG | WEIGHT: 177 LBS | BODY MASS INDEX: 26.83 KG/M2 | HEIGHT: 68 IN

## 2024-01-15 DIAGNOSIS — R30.0 BURNING WITH URINATION: ICD-10-CM

## 2024-01-15 DIAGNOSIS — R30.9 URINARY PAIN: ICD-10-CM

## 2024-01-15 DIAGNOSIS — Z11.3 SCREENING FOR STDS (SEXUALLY TRANSMITTED DISEASES): ICD-10-CM

## 2024-01-15 DIAGNOSIS — N76.0 ACUTE VAGINITIS: ICD-10-CM

## 2024-01-15 DIAGNOSIS — R35.0 URINARY FREQUENCY: ICD-10-CM

## 2024-01-15 DIAGNOSIS — R30.0 DYSURIA: Primary | ICD-10-CM

## 2024-01-15 DIAGNOSIS — M54.50 ACUTE LOW BACK PAIN WITHOUT SCIATICA, UNSPECIFIED BACK PAIN LATERALITY: ICD-10-CM

## 2024-01-15 LAB
BILIRUBIN, URINE, POC: NORMAL
BLOOD URINE, POC: NORMAL
GLUCOSE URINE, POC: NORMAL
KETONES, URINE, POC: NORMAL
LEUKOCYTE ESTERASE, URINE, POC: NORMAL
NITRITE, URINE, POC: NEGATIVE
PH, URINE, POC: 5 (ref 4.6–8)
PROTEIN,URINE, POC: NORMAL
SPECIFIC GRAVITY, URINE, POC: 1.02 (ref 1–1.03)
URINALYSIS CLARITY, POC: CLEAR
URINALYSIS COLOR, POC: YELLOW
UROBILINOGEN, POC: NORMAL

## 2024-01-15 PROCEDURE — 81003 URINALYSIS AUTO W/O SCOPE: CPT | Performed by: NURSE PRACTITIONER

## 2024-01-15 PROCEDURE — 99213 OFFICE O/P EST LOW 20 MIN: CPT | Performed by: NURSE PRACTITIONER

## 2024-01-15 RX ORDER — NITROFURANTOIN 25; 75 MG/1; MG/1
100 CAPSULE ORAL 2 TIMES DAILY
Qty: 20 CAPSULE | Refills: 0 | Status: SHIPPED | OUTPATIENT
Start: 2024-01-15 | End: 2024-01-25

## 2024-01-15 RX ORDER — PHENAZOPYRIDINE HYDROCHLORIDE 200 MG/1
200 TABLET, FILM COATED ORAL 3 TIMES DAILY PRN
Qty: 9 TABLET | Refills: 0 | Status: SHIPPED | OUTPATIENT
Start: 2024-01-15 | End: 2024-01-18

## 2024-01-15 RX ORDER — FLUCONAZOLE 150 MG/1
150 TABLET ORAL
Qty: 2 TABLET | Refills: 0 | Status: SHIPPED | OUTPATIENT
Start: 2024-01-15 | End: 2024-01-21

## 2024-01-15 NOTE — PROGRESS NOTES
CC:   Chief Complaint   Patient presents with    Urinary Pain    Urinary Burning    Back Pain       HPI:    Bud  is a 48 y.o. , , Patient's last menstrual period was 2024 (exact date)., who is seen for c/o urinary frequency, back pain and dysuria. The patient states she started experiencing dysuria and urinary frequency last Tuesday (6 days ago). States she did take OTC AZO and reports \"taking an antibiotic I had left over from a sinus infection\" and did not resolve symptoms. States started experiencing lower back pain last Thursday (4 days ago) and is concerned for possible UTI/Kidney infection. She also reports experiencing a \"white clumpy vaginal discharge and vaginal itching\" last Tuesday (6 days ago) however, after taking OTC Monistat those symptoms have resolved. She denies fevers, chills, abnormal vaginal discharge, itching, odor or urinary urgency today.    Contraception: Tubal Ligation      Menses:  Q 28-30 Days x 5-6 days, Heavy Flow (has noticed for past couple of years)    Days 1-3-heavy bleeding. Wears super tampons and pads. Changes every hour. No intermenstrual VB/spotting, Severe dysmenorrhea (takes OTC medications)    Sexually active with male partner. No concerns for STDs however, would like STD testing on urine specimen today.  Denies post coital VB or dyspareunia.        GYN HISTORY:  As per HPI     Hx STDs: HSV-2 (diagnosed 28 years ago). Last outbreak last week. States takes Valtrex daily for suppression.   Denies any prodromal symptoms during visit today.     Last Pap:  2023-ASCUS/Neg HPV    Mammo: 3/2022: BI-RADS 1-Neg  Has order for mammo from annual exam in 2023.       Current Outpatient Medications on File Prior to Visit   Medication Sig Dispense Refill    ondansetron (ZOFRAN) 4 MG tablet Take 1 tablet by mouth daily as needed      tiZANidine (ZANAFLEX) 4 MG tablet Take 1 tablet by mouth 3 times daily as needed      omeprazole (PRILOSEC) 40 MG delayed release

## 2024-01-17 LAB
C TRACH RRNA SPEC QL NAA+PROBE: NEGATIVE
N GONORRHOEA RRNA SPEC QL NAA+PROBE: NEGATIVE
SPECIMEN SOURCE: NORMAL

## 2024-01-18 ENCOUNTER — NURSE ONLY (OUTPATIENT)
Dept: OBGYN CLINIC | Age: 49
End: 2024-01-18

## 2024-01-18 DIAGNOSIS — R30.0 DYSURIA: Primary | ICD-10-CM

## 2024-01-18 DIAGNOSIS — R30.0 DYSURIA: ICD-10-CM

## 2024-01-18 DIAGNOSIS — R35.0 URINARY FREQUENCY: ICD-10-CM

## 2024-01-18 LAB
BACTERIA SPEC CULT: NORMAL
BACTERIA SPEC CULT: NORMAL
SERVICE CMNT-IMP: NORMAL

## 2024-01-18 NOTE — PROGRESS NOTES
Urine Cx contaminated. Patient states continues having symptoms and will come into office to recollect urine culture. Order entered for repeat urine culture due to continued dysuria and urinary frequency.

## 2024-01-21 LAB
BACTERIA SPEC CULT: NORMAL
SERVICE CMNT-IMP: NORMAL

## 2024-02-08 ENCOUNTER — OFFICE VISIT (OUTPATIENT)
Age: 49
End: 2024-02-08

## 2024-02-08 VITALS — TEMPERATURE: 98.4 F | HEART RATE: 91 BPM | OXYGEN SATURATION: 98 %

## 2024-02-08 DIAGNOSIS — B37.9 ANTIBIOTIC-INDUCED YEAST INFECTION: ICD-10-CM

## 2024-02-08 DIAGNOSIS — T36.95XA ANTIBIOTIC-INDUCED YEAST INFECTION: ICD-10-CM

## 2024-02-08 DIAGNOSIS — J01.01 ACUTE RECURRENT MAXILLARY SINUSITIS: ICD-10-CM

## 2024-02-08 DIAGNOSIS — G89.29 CHRONIC MIDLINE LOW BACK PAIN WITH LEFT-SIDED SCIATICA: Primary | ICD-10-CM

## 2024-02-08 DIAGNOSIS — M54.42 CHRONIC MIDLINE LOW BACK PAIN WITH LEFT-SIDED SCIATICA: Primary | ICD-10-CM

## 2024-02-08 RX ORDER — FLUCONAZOLE 150 MG/1
TABLET ORAL
Qty: 2 TABLET | Refills: 0 | Status: SHIPPED | OUTPATIENT
Start: 2024-02-08

## 2024-02-08 RX ORDER — PREDNISONE 20 MG/1
TABLET ORAL
Qty: 9 TABLET | Refills: 0 | Status: SHIPPED | OUTPATIENT
Start: 2024-02-08

## 2024-02-08 RX ORDER — DOXYCYCLINE HYCLATE 100 MG
100 TABLET ORAL 2 TIMES DAILY
Qty: 14 TABLET | Refills: 0 | Status: SHIPPED | OUTPATIENT
Start: 2024-02-08 | End: 2024-02-15

## 2024-02-08 ASSESSMENT — ENCOUNTER SYMPTOMS
SORE THROAT: 0
HOARSE VOICE: 0
SINUS PRESSURE: 1
SHORTNESS OF BREATH: 0
SWOLLEN GLANDS: 0
COUGH: 0

## 2024-02-08 NOTE — PROGRESS NOTES
PROGRESS NOTE    SUBJECTIVE:     Bud Sun is a 48 y.o. female seen for:    Chief Complaint    Nasal Congestion       Sinusitis  This is a new problem. Episode onset: Last Friday. The problem has been gradually worsening since onset. There has been no fever. Associated symptoms include congestion, ear pain, headaches, sinus pressure and sneezing. Pertinent negatives include no chills, coughing, diaphoresis, hoarse voice, neck pain, shortness of breath, sore throat or swollen glands. Treatments tried: Mucinex. The treatment provided no relief.   Back Pain  This is a chronic problem. The current episode started in the past 7 days. The problem is unchanged. The pain is present in the lumbar spine. The pain radiates to the left thigh. The pain is severe. Exacerbated by: movement. Associated symptoms include headaches. Pertinent negatives include no abdominal pain, bladder incontinence, bowel incontinence, chest pain, dysuria, fever, leg pain, numbness, paresis, paresthesias, pelvic pain, perianal numbness, tingling, weakness or weight loss. She has tried nothing for the symptoms.     Current Outpatient Medications   Medication Sig Dispense Refill    doxycycline hyclate (VIBRA-TABS) 100 MG tablet Take 1 tablet by mouth 2 times daily for 7 days 14 tablet 0    fluconazole (DIFLUCAN) 150 MG tablet Take 1 tablet by mouth once, then take 1 tablet by mouth 72 hours later if symptoms persist. 2 tablet 0    ondansetron (ZOFRAN) 4 MG tablet Take 1 tablet by mouth daily as needed      tiZANidine (ZANAFLEX) 4 MG tablet Take 1 tablet by mouth 3 times daily as needed      omeprazole (PRILOSEC) 40 MG delayed release capsule Take 1 capsule by mouth daily 90 capsule 0    acyclovir (ZOVIRAX) 400 MG tablet TAKE 1 TO 2 TABLETS BY MOUTH EVERY  tablet 4    Multiple Vitamins-Minerals (MULTIVITAMIN ADULT, MINERALS,) TABS Take by mouth daily      montelukast (SINGULAIR) 10 MG tablet Take 1 tablet by mouth daily (Patient taking

## 2024-02-09 ASSESSMENT — ENCOUNTER SYMPTOMS
CHEST TIGHTNESS: 0
BACK PAIN: 1
ALLERGIC/IMMUNOLOGIC NEGATIVE: 1
CHOKING: 0
ABDOMINAL PAIN: 0
EYES NEGATIVE: 1
SINUS PAIN: 1
BOWEL INCONTINENCE: 0
RHINORRHEA: 1
STRIDOR: 0
WHEEZING: 0
APNEA: 0

## 2024-03-06 ENCOUNTER — OFFICE VISIT (OUTPATIENT)
Dept: OBGYN CLINIC | Age: 49
End: 2024-03-06
Payer: COMMERCIAL

## 2024-03-06 VITALS — WEIGHT: 184 LBS | SYSTOLIC BLOOD PRESSURE: 122 MMHG | DIASTOLIC BLOOD PRESSURE: 78 MMHG | BODY MASS INDEX: 28.39 KG/M2

## 2024-03-06 DIAGNOSIS — R19.00 PELVIC MASS: ICD-10-CM

## 2024-03-06 DIAGNOSIS — Z11.3 SCREENING FOR STDS (SEXUALLY TRANSMITTED DISEASES): ICD-10-CM

## 2024-03-06 DIAGNOSIS — R30.0 DYSURIA: Primary | ICD-10-CM

## 2024-03-06 DIAGNOSIS — N89.8 VAGINAL ITCHING: ICD-10-CM

## 2024-03-06 DIAGNOSIS — Z11.8 SCREENING EXAMINATION FOR PARASITIC INFECTION: ICD-10-CM

## 2024-03-06 DIAGNOSIS — L29.2 VULVAR ITCHING: ICD-10-CM

## 2024-03-06 DIAGNOSIS — R10.2 PELVIC PAIN: ICD-10-CM

## 2024-03-06 LAB
BILIRUBIN, URINE, POC: NEGATIVE
BLOOD URINE, POC: NEGATIVE
GLUCOSE URINE, POC: NEGATIVE
KETONES, URINE, POC: NEGATIVE
LEUKOCYTE ESTERASE, URINE, POC: NEGATIVE
NITRITE, URINE, POC: NEGATIVE
PH, URINE, POC: 7.5 (ref 4.6–8)
PROTEIN,URINE, POC: NEGATIVE
SPECIFIC GRAVITY, URINE, POC: 1.01 (ref 1–1.03)
URINALYSIS CLARITY, POC: CLEAR
URINALYSIS COLOR, POC: YELLOW
UROBILINOGEN, POC: NORMAL

## 2024-03-06 PROCEDURE — 99459 PELVIC EXAMINATION: CPT | Performed by: OBSTETRICS & GYNECOLOGY

## 2024-03-06 PROCEDURE — 99213 OFFICE O/P EST LOW 20 MIN: CPT | Performed by: OBSTETRICS & GYNECOLOGY

## 2024-03-06 PROCEDURE — 81002 URINALYSIS NONAUTO W/O SCOPE: CPT | Performed by: OBSTETRICS & GYNECOLOGY

## 2024-03-06 RX ORDER — CLOTRIMAZOLE AND BETAMETHASONE DIPROPIONATE 10; .64 MG/G; MG/G
CREAM TOPICAL
Qty: 45 G | Refills: 0 | Status: SHIPPED | OUTPATIENT
Start: 2024-03-06

## 2024-03-06 ASSESSMENT — PATIENT HEALTH QUESTIONNAIRE - PHQ9
SUM OF ALL RESPONSES TO PHQ QUESTIONS 1-9: 0
1. LITTLE INTEREST OR PLEASURE IN DOING THINGS: 0
10. IF YOU CHECKED OFF ANY PROBLEMS, HOW DIFFICULT HAVE THESE PROBLEMS MADE IT FOR YOU TO DO YOUR WORK, TAKE CARE OF THINGS AT HOME, OR GET ALONG WITH OTHER PEOPLE: 0
SUM OF ALL RESPONSES TO PHQ QUESTIONS 1-9: 0
8. MOVING OR SPEAKING SO SLOWLY THAT OTHER PEOPLE COULD HAVE NOTICED. OR THE OPPOSITE, BEING SO FIGETY OR RESTLESS THAT YOU HAVE BEEN MOVING AROUND A LOT MORE THAN USUAL: 0
3. TROUBLE FALLING OR STAYING ASLEEP: 0
6. FEELING BAD ABOUT YOURSELF - OR THAT YOU ARE A FAILURE OR HAVE LET YOURSELF OR YOUR FAMILY DOWN: 0
5. POOR APPETITE OR OVEREATING: 0
SUM OF ALL RESPONSES TO PHQ QUESTIONS 1-9: 0
SUM OF ALL RESPONSES TO PHQ9 QUESTIONS 1 & 2: 0
SUM OF ALL RESPONSES TO PHQ QUESTIONS 1-9: 0
4. FEELING TIRED OR HAVING LITTLE ENERGY: 0
2. FEELING DOWN, DEPRESSED OR HOPELESS: 0
7. TROUBLE CONCENTRATING ON THINGS, SUCH AS READING THE NEWSPAPER OR WATCHING TELEVISION: 0
9. THOUGHTS THAT YOU WOULD BE BETTER OFF DEAD, OR OF HURTING YOURSELF: 0

## 2024-03-06 NOTE — PROGRESS NOTES
MG per tablet, Take 1 tablet by mouth every 6 hours as needed for Pain., Disp: , Rfl:     albuterol sulfate  (90 Base) MCG/ACT inhaler, Inhale 2 puffs into the lungs every 4 hours as needed, Disp: , Rfl:     aspirin 325 MG tablet, Take 1 tablet by mouth every 6 hours as needed, Disp: , Rfl:     Family Cancer History:   Cancer-related family history includes Cancer in her father; Colon Cancer in her father.     Social History:  reports that she has never smoked. She has never been exposed to tobacco smoke. She has never used smokeless tobacco. She reports current alcohol use. She reports that she does not use drugs.    Ob History:  No obstetric history on file.     Sexual History:  has no history on file for sexual activity.    PHYSICAL EXAM:  Vitals:  weight is 83.5 kg (184 lb). Her blood pressure is 122/78.  Body mass index is 28.39 kg/m².  Physical Exam  Constitutional:       General: She is awake. She is not in acute distress.     Appearance: Normal appearance. She is not ill-appearing.   HENT:      Head: Normocephalic and atraumatic.   Eyes:      Conjunctiva/sclera: Conjunctivae normal.   Cardiovascular:      Rate and Rhythm: Normal rate.   Pulmonary:      Effort: Pulmonary effort is normal.   Genitourinary:        Musculoskeletal:         General: Normal range of motion.      Cervical back: Normal range of motion.   Skin:     General: Skin is warm and dry.   Neurological:      General: No focal deficit present.      Mental Status: She is alert and oriented to person, place, and time.      Motor: Motor function is intact.      Coordination: Coordination is intact.   Psychiatric:         Behavior: Behavior normal.         Cognition and Memory: Cognition normal.         Judgment: Judgment normal.        The external genitalia is normal.  Urethral meatus is normal. Vagina is pink and rugated. The bladder and urethra are normal .  The cervix is normal.  The uterus is irregular. The ovaries are normal.

## 2024-03-10 LAB
BVAB2 DNA VAG QL NAA+PROBE: NORMAL SCORE
C ALBICANS DNA VAG QL NAA+PROBE: NEGATIVE
C GLABRATA DNA VAG QL NAA+PROBE: NEGATIVE
C TRACH RRNA SPEC QL NAA+PROBE: NEGATIVE
MEGA1 DNA VAG QL NAA+PROBE: NORMAL SCORE
N GONORRHOEA RRNA SPEC QL NAA+PROBE: NEGATIVE
SPECIMEN SOURCE: NORMAL
T VAGINALIS RRNA SPEC QL NAA+PROBE: NEGATIVE

## 2024-03-30 NOTE — TELEPHONE ENCOUNTER
Patient scheduled with Sheila Durham 6/29/22 patient to meet estimated energy needs and take > 75% of meals and supplements

## 2024-04-01 DIAGNOSIS — J45.909 ASTHMA DUE TO SEASONAL ALLERGIES: ICD-10-CM

## 2024-04-01 RX ORDER — MONTELUKAST SODIUM 10 MG/1
10 TABLET ORAL DAILY
Qty: 90 TABLET | Refills: 1 | OUTPATIENT
Start: 2024-04-01

## 2024-04-02 ASSESSMENT — ENCOUNTER SYMPTOMS
ALLERGIC/IMMUNOLOGIC NEGATIVE: 1
RESPIRATORY NEGATIVE: 1
EYES NEGATIVE: 1
GASTROINTESTINAL NEGATIVE: 1

## 2024-04-02 NOTE — PROGRESS NOTES
Name: Bud Sun  Date: 4/3/2024  YOB: 1975  LMP: Patient's last menstrual period was 2024.      Bud is a 48 y.o.   who is here for a follow up for US (pelvic pain, fibroid) . Mostly rlq.    Birth control: BTL  Menstrual status: regular cycles  Gyn Surgery: s/p BTL     Health Maintenance:  Pap Smear: last pap in 23 ASCUS, neg HPV  HPV vaccine: not done  If 40 or older, Mammo: last mammo in 22, pathology negative  If 45 or older, Colonoscopy:  yesterday  If postmenopausal, Dexa scan: not needed       Review of Systems   Constitutional: Negative.    HENT: Negative.     Eyes: Negative.    Respiratory: Negative.     Cardiovascular: Negative.    Gastrointestinal: Negative.    Endocrine: Negative.    Genitourinary: Negative.    Musculoskeletal: Negative.    Skin: Negative.    Allergic/Immunologic: Negative.    Hematological: Negative.    Psychiatric/Behavioral: Negative.  Negative for self-injury and suicidal ideas.         Past Medical History:  No date: Acid reflux      Comment:  managed with meds  No date: Anemia  No date: Anxiety      Comment:  managed with meds  No date: Arthralgia of multiple sites  No date: Asthma      Comment:  inhaler as needed- lastuse 23  No date: Bipolar 1 disorder (HCC)      Comment:  managed with meds  No date: Chronic low back pain  No date: Chronic pain      Comment:  lower back  No date: Depressive disorder  No date: Drug induced constipation  No date: Genital herpes  No date: Insomnia  No date: MRSA (methicillin resistant staph aureus) culture positive      Comment:  on skin of abd-- --- was tx---pt states just staph                not MRSA  No date: Multiple joint pain  No date: Spinal stenosis  No date: Stomach ulcer      Comment:  Hx     Past Surgical History:  No date: BACK SURGERY      Comment:  2017 no hardware  2023: COLONOSCOPY; N/A      Comment:  COLORECTAL CANCER SCREENING, NOT HIGH RISK performed by

## 2024-04-03 ENCOUNTER — PROCEDURE VISIT (OUTPATIENT)
Dept: OBGYN CLINIC | Age: 49
End: 2024-04-03
Payer: COMMERCIAL

## 2024-04-03 ENCOUNTER — OFFICE VISIT (OUTPATIENT)
Dept: OBGYN CLINIC | Age: 49
End: 2024-04-03
Payer: COMMERCIAL

## 2024-04-03 VITALS
DIASTOLIC BLOOD PRESSURE: 66 MMHG | HEIGHT: 68 IN | BODY MASS INDEX: 27.58 KG/M2 | SYSTOLIC BLOOD PRESSURE: 120 MMHG | WEIGHT: 182 LBS

## 2024-04-03 DIAGNOSIS — R10.31 CHRONIC RLQ PAIN: Primary | ICD-10-CM

## 2024-04-03 DIAGNOSIS — G89.29 CHRONIC RLQ PAIN: Primary | ICD-10-CM

## 2024-04-03 DIAGNOSIS — R30.0 DYSURIA: Primary | ICD-10-CM

## 2024-04-03 PROCEDURE — 76830 TRANSVAGINAL US NON-OB: CPT | Performed by: OBSTETRICS & GYNECOLOGY

## 2024-04-03 PROCEDURE — 99213 OFFICE O/P EST LOW 20 MIN: CPT | Performed by: OBSTETRICS & GYNECOLOGY

## 2024-05-01 ENCOUNTER — TELEPHONE (OUTPATIENT)
Dept: BEHAVIORAL/MENTAL HEALTH CLINIC | Age: 49
End: 2024-05-01

## 2024-05-01 NOTE — TELEPHONE ENCOUNTER
Patient called requesting an appointment with you, patient wants to start care with you again. Last appointment was on  12/2022. Please advise

## 2024-05-14 ENCOUNTER — OFFICE VISIT (OUTPATIENT)
Dept: BEHAVIORAL/MENTAL HEALTH CLINIC | Age: 49
End: 2024-05-14
Payer: COMMERCIAL

## 2024-05-14 VITALS — DIASTOLIC BLOOD PRESSURE: 72 MMHG | HEART RATE: 75 BPM | OXYGEN SATURATION: 99 % | SYSTOLIC BLOOD PRESSURE: 108 MMHG

## 2024-05-14 DIAGNOSIS — F51.05 INSOMNIA DUE TO MENTAL CONDITION: ICD-10-CM

## 2024-05-14 DIAGNOSIS — F41.9 ANXIETY: ICD-10-CM

## 2024-05-14 DIAGNOSIS — F41.1 GENERALIZED ANXIETY DISORDER: ICD-10-CM

## 2024-05-14 DIAGNOSIS — F31.62 BIPOLAR DISORDER, CURRENT EPISODE MIXED, MODERATE (HCC): Primary | ICD-10-CM

## 2024-05-14 DIAGNOSIS — F31.62 BIPOLAR DISORDER, CURRENT EPISODE MIXED, MODERATE (HCC): ICD-10-CM

## 2024-05-14 LAB
AMPHET UR QL SCN: NEGATIVE
BARBITURATES UR QL SCN: NEGATIVE
BENZODIAZ UR QL: NEGATIVE
CANNABINOIDS UR QL SCN: NEGATIVE
COCAINE UR QL SCN: NEGATIVE
METHADONE UR QL: NEGATIVE
OPIATES UR QL: NEGATIVE
PCP UR QL: NEGATIVE

## 2024-05-14 PROCEDURE — 90792 PSYCH DIAG EVAL W/MED SRVCS: CPT | Performed by: NURSE PRACTITIONER

## 2024-05-14 RX ORDER — TRAZODONE HYDROCHLORIDE 100 MG/1
100 TABLET ORAL NIGHTLY PRN
Qty: 30 TABLET | Refills: 2 | Status: SHIPPED | OUTPATIENT
Start: 2024-05-14 | End: 2024-08-12

## 2024-05-14 RX ORDER — HYDROXYZINE HYDROCHLORIDE 25 MG/1
25 TABLET, FILM COATED ORAL 3 TIMES DAILY PRN
Qty: 90 TABLET | Refills: 3 | Status: SHIPPED | OUTPATIENT
Start: 2024-05-14 | End: 2024-09-11

## 2024-05-14 RX ORDER — LAMOTRIGINE 100 MG/1
100 TABLET ORAL DAILY
Qty: 30 TABLET | Refills: 3 | Status: SHIPPED | OUTPATIENT
Start: 2024-05-14 | End: 2024-09-11

## 2024-05-14 RX ORDER — LAMOTRIGINE 25 MG/1
25 TABLET ORAL DAILY
Qty: 42 TABLET | Refills: 0 | Status: SHIPPED | OUTPATIENT
Start: 2024-05-14 | End: 2024-05-28

## 2024-05-14 NOTE — PROGRESS NOTES
suicidal/homicidal ideations: Denies SI/ HI  Current auditory/visual hallucinations: Denies     Medications:    Current Outpatient Medications:     clotrimazole-betamethasone (LOTRISONE) 1-0.05 % cream, Apply topically 2 times daily., Disp: 45 g, Rfl: 0    ondansetron (ZOFRAN) 4 MG tablet, Take 1 tablet by mouth daily as needed, Disp: , Rfl:     tiZANidine (ZANAFLEX) 4 MG tablet, Take 1 tablet by mouth 3 times daily as needed, Disp: , Rfl:     omeprazole (PRILOSEC) 40 MG delayed release capsule, Take 1 capsule by mouth daily, Disp: 90 capsule, Rfl: 0    acyclovir (ZOVIRAX) 400 MG tablet, TAKE 1 TO 2 TABLETS BY MOUTH EVERY DAY, Disp: 180 tablet, Rfl: 4    Multiple Vitamins-Minerals (MULTIVITAMIN ADULT, MINERALS,) TABS, Take by mouth daily, Disp: , Rfl:     montelukast (SINGULAIR) 10 MG tablet, Take 1 tablet by mouth daily (Patient taking differently: Take 1 tablet by mouth nightly), Disp: 30 tablet, Rfl: 3    sucralfate (CARAFATE) 1 GM tablet, Take 1 tablet by mouth 4 times daily Take 1 hour before meals and bedtime., Disp: 120 tablet, Rfl: 0    methocarbamol (ROBAXIN) 750 MG tablet, Take 1 tablet by mouth nightly as needed, Disp: , Rfl:     fluticasone (FLONASE) 50 MCG/ACT nasal spray, 2 sprays by Nasal route daily, Disp: 16 g, Rfl: 2    loratadine (CLARITIN) 10 MG capsule, Take 1 tablet by mouth daily. (Patient taking differently: Take 1 capsule by mouth at bedtime Take 1 tablet by mouth daily.), Disp: 30 capsule, Rfl: 2    clonazePAM (KLONOPIN) 1 MG tablet, Take 1 tablet by mouth daily as needed for Anxiety for up to 30 days. Max Daily Amount: 1 mg, Disp: 30 tablet, Rfl: 2    HYDROcodone-acetaminophen (NORCO) 5-325 MG per tablet, Take 1 tablet by mouth every 6 hours as needed for Pain., Disp: , Rfl:     albuterol sulfate  (90 Base) MCG/ACT inhaler, Inhale 2 puffs into the lungs every 4 hours as needed, Disp: , Rfl:     aspirin 325 MG tablet, Take 1 tablet by mouth every 6 hours as needed, Disp: , Rfl:

## 2024-05-30 ENCOUNTER — OFFICE VISIT (OUTPATIENT)
Age: 49
End: 2024-05-30

## 2024-05-30 VITALS — HEART RATE: 75 BPM | OXYGEN SATURATION: 98 %

## 2024-05-30 DIAGNOSIS — J45.909 ASTHMA DUE TO SEASONAL ALLERGIES: ICD-10-CM

## 2024-05-30 DIAGNOSIS — R68.89 FLU-LIKE SYMPTOMS: ICD-10-CM

## 2024-05-30 DIAGNOSIS — J06.9 VIRAL UPPER RESPIRATORY TRACT INFECTION: Primary | ICD-10-CM

## 2024-05-30 DIAGNOSIS — J30.2 SEASONAL ALLERGIES: ICD-10-CM

## 2024-05-30 LAB
EXP DATE SOLUTION: NORMAL
EXP DATE SWAB: NORMAL
EXPIRATION DATE: NORMAL
LOT NUMBER POC: NORMAL
LOT NUMBER SOLUTION: NORMAL
LOT NUMBER SWAB: NORMAL
QUICKVUE INFLUENZA TEST: NEGATIVE
SARS-COV-2 RNA, POC: NEGATIVE
VALID INTERNAL CONTROL, POC: YES

## 2024-05-30 RX ORDER — LORATADINE 10 MG/1
CAPSULE, LIQUID FILLED ORAL
Qty: 90 CAPSULE | Refills: 0 | Status: SHIPPED | OUTPATIENT
Start: 2024-05-30

## 2024-05-30 RX ORDER — MONTELUKAST SODIUM 10 MG/1
10 TABLET ORAL NIGHTLY
Qty: 90 TABLET | Refills: 0 | Status: SHIPPED | OUTPATIENT
Start: 2024-05-30

## 2024-05-30 ASSESSMENT — ENCOUNTER SYMPTOMS
COUGH: 0
VOMITING: 0
NAUSEA: 1
SWOLLEN GLANDS: 0
RHINORRHEA: 1
SORE THROAT: 0
ABDOMINAL PAIN: 1
WHEEZING: 0

## 2024-05-30 NOTE — PROGRESS NOTES
evaluation including, but not limited to HA, blurred vision, speech disturbance, difficulty with ambulation/gait, numbness, tingling, weakness, syncope, chest pain, or shortness of breath.    I have reviewed the patient's medication list, past medical, family, social, and surgical history in detail and updated the patient record appropriately.    Bridget Zhao, APRN - CNP

## 2024-05-31 ASSESSMENT — ENCOUNTER SYMPTOMS
EYE ITCHING: 1
SINUS PRESSURE: 1
CHOKING: 0
DIARRHEA: 1
SHORTNESS OF BREATH: 0
ALLERGIC/IMMUNOLOGIC NEGATIVE: 1
EYE DISCHARGE: 1
APNEA: 0
EYE REDNESS: 1
STRIDOR: 0
PHOTOPHOBIA: 0
EYE PAIN: 0
CHEST TIGHTNESS: 0

## 2024-06-04 DIAGNOSIS — F31.62 BIPOLAR DISORDER, CURRENT EPISODE MIXED, MODERATE (HCC): ICD-10-CM

## 2024-06-05 RX ORDER — CARIPRAZINE 1.5 MG/1
1.5 CAPSULE, GELATIN COATED ORAL DAILY
Qty: 90 CAPSULE | Refills: 0 | OUTPATIENT
Start: 2024-06-05

## 2024-06-06 ENCOUNTER — OFFICE VISIT (OUTPATIENT)
Dept: FAMILY MEDICINE CLINIC | Facility: CLINIC | Age: 49
End: 2024-06-06
Payer: COMMERCIAL

## 2024-06-06 ENCOUNTER — TELEPHONE (OUTPATIENT)
Dept: BEHAVIORAL/MENTAL HEALTH CLINIC | Age: 49
End: 2024-06-06

## 2024-06-06 VITALS
OXYGEN SATURATION: 98 % | HEART RATE: 78 BPM | WEIGHT: 183 LBS | BODY MASS INDEX: 28.72 KG/M2 | SYSTOLIC BLOOD PRESSURE: 120 MMHG | HEIGHT: 67 IN | DIASTOLIC BLOOD PRESSURE: 78 MMHG

## 2024-06-06 DIAGNOSIS — Z13.220 SCREENING CHOLESTEROL LEVEL: ICD-10-CM

## 2024-06-06 DIAGNOSIS — J32.9 RECURRENT SINUSITIS: Primary | ICD-10-CM

## 2024-06-06 DIAGNOSIS — M25.50 ARTHRALGIA, UNSPECIFIED JOINT: ICD-10-CM

## 2024-06-06 DIAGNOSIS — Z23 ENCOUNTER FOR IMMUNIZATION: ICD-10-CM

## 2024-06-06 DIAGNOSIS — Z79.899 LONG-TERM USE OF HIGH-RISK MEDICATION: ICD-10-CM

## 2024-06-06 DIAGNOSIS — R53.83 OTHER FATIGUE: ICD-10-CM

## 2024-06-06 DIAGNOSIS — Z12.39 BREAST SCREENING: ICD-10-CM

## 2024-06-06 LAB
ALBUMIN SERPL-MCNC: 3.9 G/DL (ref 3.5–5)
ALBUMIN/GLOB SERPL: 1.2 (ref 1–1.9)
ALP SERPL-CCNC: 62 U/L (ref 35–104)
ALT SERPL-CCNC: 15 U/L (ref 12–65)
ANION GAP SERPL CALC-SCNC: 9 MMOL/L (ref 9–18)
AST SERPL-CCNC: 25 U/L (ref 15–37)
BASOPHILS # BLD: 0.1 K/UL (ref 0–0.2)
BASOPHILS NFR BLD: 1 % (ref 0–2)
BILIRUB SERPL-MCNC: <0.2 MG/DL (ref 0–1.2)
BUN SERPL-MCNC: 11 MG/DL (ref 6–23)
CALCIUM SERPL-MCNC: 9.4 MG/DL (ref 8.8–10.2)
CHLORIDE SERPL-SCNC: 104 MMOL/L (ref 98–107)
CHOLEST SERPL-MCNC: 193 MG/DL (ref 0–200)
CO2 SERPL-SCNC: 25 MMOL/L (ref 20–28)
CREAT SERPL-MCNC: 0.73 MG/DL (ref 0.6–1.1)
DIFFERENTIAL METHOD BLD: ABNORMAL
EOSINOPHIL # BLD: 0.4 K/UL (ref 0–0.8)
EOSINOPHIL NFR BLD: 5 % (ref 0.5–7.8)
ERYTHROCYTE [DISTWIDTH] IN BLOOD BY AUTOMATED COUNT: 14.6 % (ref 11.9–14.6)
ERYTHROCYTE [SEDIMENTATION RATE] IN BLOOD: 4 MM/HR (ref 0–20)
GLOBULIN SER CALC-MCNC: 3.2 G/DL (ref 2.3–3.5)
GLUCOSE SERPL-MCNC: 83 MG/DL (ref 70–99)
HCT VFR BLD AUTO: 41.1 % (ref 35.8–46.3)
HDLC SERPL-MCNC: 64 MG/DL (ref 40–60)
HDLC SERPL: 3 (ref 0–5)
HGB BLD-MCNC: 12.2 G/DL (ref 11.7–15.4)
IMM GRANULOCYTES # BLD AUTO: 0 K/UL (ref 0–0.5)
IMM GRANULOCYTES NFR BLD AUTO: 0 % (ref 0–5)
LDLC SERPL CALC-MCNC: 108 MG/DL (ref 0–100)
LYMPHOCYTES # BLD: 3 K/UL (ref 0.5–4.6)
LYMPHOCYTES NFR BLD: 34 % (ref 13–44)
MCH RBC QN AUTO: 23.8 PG (ref 26.1–32.9)
MCHC RBC AUTO-ENTMCNC: 29.7 G/DL (ref 31.4–35)
MCV RBC AUTO: 80.1 FL (ref 82–102)
MONOCYTES # BLD: 0.7 K/UL (ref 0.1–1.3)
MONOCYTES NFR BLD: 7 % (ref 4–12)
NEUTS SEG # BLD: 4.8 K/UL (ref 1.7–8.2)
NEUTS SEG NFR BLD: 53 % (ref 43–78)
NRBC # BLD: 0 K/UL (ref 0–0.2)
PLATELET # BLD AUTO: 232 K/UL (ref 150–450)
PMV BLD AUTO: 12.8 FL (ref 9.4–12.3)
POTASSIUM SERPL-SCNC: 4.6 MMOL/L (ref 3.5–5.1)
PROT SERPL-MCNC: 7.1 G/DL (ref 6.3–8.2)
RBC # BLD AUTO: 5.13 M/UL (ref 4.05–5.2)
SODIUM SERPL-SCNC: 138 MMOL/L (ref 136–145)
TRIGL SERPL-MCNC: 108 MG/DL (ref 0–150)
TSH W FREE THYROID IF ABNORMAL: 1.81 UIU/ML (ref 0.27–4.2)
VLDLC SERPL CALC-MCNC: 22 MG/DL (ref 6–23)
WBC # BLD AUTO: 9 K/UL (ref 4.3–11.1)

## 2024-06-06 PROCEDURE — 90715 TDAP VACCINE 7 YRS/> IM: CPT | Performed by: NURSE PRACTITIONER

## 2024-06-06 PROCEDURE — 90471 IMMUNIZATION ADMIN: CPT | Performed by: NURSE PRACTITIONER

## 2024-06-06 PROCEDURE — 99214 OFFICE O/P EST MOD 30 MIN: CPT | Performed by: NURSE PRACTITIONER

## 2024-06-06 RX ORDER — MELOXICAM 7.5 MG/1
7.5 TABLET ORAL DAILY
Qty: 30 TABLET | Refills: 0 | Status: SHIPPED | OUTPATIENT
Start: 2024-06-06

## 2024-06-06 SDOH — ECONOMIC STABILITY: FOOD INSECURITY: WITHIN THE PAST 12 MONTHS, THE FOOD YOU BOUGHT JUST DIDN'T LAST AND YOU DIDN'T HAVE MONEY TO GET MORE.: NEVER TRUE

## 2024-06-06 SDOH — ECONOMIC STABILITY: FOOD INSECURITY: WITHIN THE PAST 12 MONTHS, YOU WORRIED THAT YOUR FOOD WOULD RUN OUT BEFORE YOU GOT MONEY TO BUY MORE.: NEVER TRUE

## 2024-06-06 SDOH — ECONOMIC STABILITY: INCOME INSECURITY: HOW HARD IS IT FOR YOU TO PAY FOR THE VERY BASICS LIKE FOOD, HOUSING, MEDICAL CARE, AND HEATING?: NOT HARD AT ALL

## 2024-06-06 ASSESSMENT — PATIENT HEALTH QUESTIONNAIRE - PHQ9
SUM OF ALL RESPONSES TO PHQ QUESTIONS 1-9: 15
SUM OF ALL RESPONSES TO PHQ QUESTIONS 1-9: 15
1. LITTLE INTEREST OR PLEASURE IN DOING THINGS: NEARLY EVERY DAY
7. TROUBLE CONCENTRATING ON THINGS, SUCH AS READING THE NEWSPAPER OR WATCHING TELEVISION: SEVERAL DAYS
9. THOUGHTS THAT YOU WOULD BE BETTER OFF DEAD, OR OF HURTING YOURSELF: NOT AT ALL
5. POOR APPETITE OR OVEREATING: NOT AT ALL
6. FEELING BAD ABOUT YOURSELF - OR THAT YOU ARE A FAILURE OR HAVE LET YOURSELF OR YOUR FAMILY DOWN: NOT AT ALL
10. IF YOU CHECKED OFF ANY PROBLEMS, HOW DIFFICULT HAVE THESE PROBLEMS MADE IT FOR YOU TO DO YOUR WORK, TAKE CARE OF THINGS AT HOME, OR GET ALONG WITH OTHER PEOPLE: SOMEWHAT DIFFICULT
8. MOVING OR SPEAKING SO SLOWLY THAT OTHER PEOPLE COULD HAVE NOTICED. OR THE OPPOSITE, BEING SO FIGETY OR RESTLESS THAT YOU HAVE BEEN MOVING AROUND A LOT MORE THAN USUAL: NEARLY EVERY DAY
2. FEELING DOWN, DEPRESSED OR HOPELESS: NEARLY EVERY DAY
SUM OF ALL RESPONSES TO PHQ QUESTIONS 1-9: 15
SUM OF ALL RESPONSES TO PHQ9 QUESTIONS 1 & 2: 6
SUM OF ALL RESPONSES TO PHQ QUESTIONS 1-9: 15
3. TROUBLE FALLING OR STAYING ASLEEP: NEARLY EVERY DAY
4. FEELING TIRED OR HAVING LITTLE ENERGY: MORE THAN HALF THE DAYS

## 2024-06-06 ASSESSMENT — ENCOUNTER SYMPTOMS
ALLERGIC/IMMUNOLOGIC NEGATIVE: 1
RESPIRATORY NEGATIVE: 1
GASTROINTESTINAL NEGATIVE: 1
EYES NEGATIVE: 1

## 2024-06-06 NOTE — TELEPHONE ENCOUNTER
Patient was here seeing Donna, and upon leaving she states she would like a call back she thinks one of her medications Claudia has her on is causing a her to burn when urinating. Please advise

## 2024-06-06 NOTE — PROGRESS NOTES
[facility] [address]  [place] [address]   (ph) [phone] (fax) [phone]  ALLEGRA Diana      Chief Complaint   Patient presents with    Follow-up       47 yo female comes in as a recheck. She has not been in to see me since 2021. She has been seeing Claudia and she also has a gyn. Last pap was in 2023. It showed ASCUS; however, gyn is following this. LMP 05/07/24. Pt is requesting to see an ENT due to frequent sinus infections, treated by her work NP. She reports having 3-4 infections per year.         Allergies   Allergen Reactions    Tramadol Rash       Past Medical History:   Diagnosis Date    Acid reflux     managed with meds    Anemia     Anxiety     managed with meds    Arthralgia of multiple sites     Asthma     inhaler as needed- lastuse 6/7/23    Bipolar 1 disorder (HCC)     managed with meds    Chronic low back pain     Chronic pain     lower back    Depressive disorder     Drug induced constipation     Genital herpes     Insomnia     MRSA (methicillin resistant staph aureus) culture positive     on skin of abd-- 2015--- was tx---pt states just staph not MRSA    Multiple joint pain     Spinal stenosis     Stomach ulcer     Hx       Family History   Problem Relation Age of Onset    Diabetes Sister     Hypertension Sister     Cancer Father     Colon Cancer Father     Hypertension Mother        Social History     Socioeconomic History    Marital status: Single     Spouse name: Not on file    Number of children: Not on file    Years of education: Not on file    Highest education level: Not on file   Occupational History    Not on file   Tobacco Use    Smoking status: Never     Passive exposure: Never    Smokeless tobacco: Never   Vaping Use    Vaping Use: Never used   Substance and Sexual Activity    Alcohol use: Yes     Comment: occas    Drug use: No    Sexual activity: Not on file     Comment: tubal ligation   Other Topics Concern

## 2024-06-06 NOTE — TELEPHONE ENCOUNTER
Patient states that she has been having burning while urinating for the past few weeks. No other UTI-like symptoms, such as odor, discoloration, frequency or urgency. She states she did mention it to Donna today but a UA was not ordered. She was instructed to notify BH provider.

## 2024-06-06 NOTE — TELEPHONE ENCOUNTER
Spoke to Claudia, who states not a common side effect of Hydroxyzine. She would like PCP to address for poss UA to rule out UTI before any medication changes.     Spoke to Donna, who is in agreement for patient to have UA done.     Called patient, notifying of above. She verbalized understanding & scheduled lab/nurse visit for tomorrow morning to provide urine sample. Placed on nurse schedule at 8am.

## 2024-06-07 ENCOUNTER — NURSE ONLY (OUTPATIENT)
Dept: FAMILY MEDICINE CLINIC | Facility: CLINIC | Age: 49
End: 2024-06-07
Payer: COMMERCIAL

## 2024-06-07 DIAGNOSIS — F41.9 ANXIETY: Primary | ICD-10-CM

## 2024-06-07 DIAGNOSIS — R30.0 BURNING WITH URINATION: Primary | ICD-10-CM

## 2024-06-07 LAB
BILIRUBIN, URINE, POC: NEGATIVE
BLOOD URINE, POC: NORMAL
GLUCOSE URINE, POC: NEGATIVE
KETONES, URINE, POC: NEGATIVE
LEUKOCYTE ESTERASE, URINE, POC: NEGATIVE
NITRITE, URINE, POC: NEGATIVE
PH, URINE, POC: 6 (ref 4.6–8)
PROTEIN,URINE, POC: NEGATIVE
SPECIFIC GRAVITY, URINE, POC: 1.01 (ref 1–1.03)
URINALYSIS CLARITY, POC: CLEAR
URINALYSIS COLOR, POC: YELLOW
UROBILINOGEN, POC: NORMAL

## 2024-06-07 PROCEDURE — 81003 URINALYSIS AUTO W/O SCOPE: CPT | Performed by: NURSE PRACTITIONER

## 2024-06-07 RX ORDER — GABAPENTIN 300 MG/1
300 CAPSULE ORAL 2 TIMES DAILY PRN
Qty: 60 CAPSULE | Refills: 3 | Status: SHIPPED | OUTPATIENT
Start: 2024-06-07 | End: 2024-10-05

## 2024-06-07 NOTE — TELEPHONE ENCOUNTER
Patient came by office this morning to provide urine sample to rule out UTI. Believed urinary burning is caused by Hydroxyzine as this symptom started around the same time she started this medication. Stated that she didn't notice a difference with this medication. Agreeable to trying something different, if Claudia agrees.   Dr. Maguire reviewed UA & noted to not have a UTI.   Claudia Forman notified to see if she could like to change medication.

## 2024-06-07 NOTE — PROGRESS NOTES
[place] [facility] [place] provide a urine samples d/t urinary burning. UA was reviewed by Dr. Maguire & noted to not have a UTI. Will discuss with Claudia to see if patient needs to change med.

## 2024-06-10 RX ORDER — TRAZODONE HYDROCHLORIDE 100 MG/1
100 TABLET ORAL DAILY PRN
Qty: 90 TABLET | Refills: 1 | OUTPATIENT
Start: 2024-06-10

## 2024-06-19 DIAGNOSIS — D50.9 MICROCYTIC ANEMIA: Primary | ICD-10-CM

## 2024-06-24 ENCOUNTER — OFFICE VISIT (OUTPATIENT)
Dept: ENT CLINIC | Age: 49
End: 2024-06-24
Payer: COMMERCIAL

## 2024-06-24 VITALS
DIASTOLIC BLOOD PRESSURE: 78 MMHG | BODY MASS INDEX: 28.12 KG/M2 | SYSTOLIC BLOOD PRESSURE: 122 MMHG | HEIGHT: 67 IN | WEIGHT: 179.2 LBS

## 2024-06-24 DIAGNOSIS — J32.4 CHRONIC PANSINUSITIS: Primary | ICD-10-CM

## 2024-06-24 DIAGNOSIS — J34.2 DEVIATED SEPTUM: ICD-10-CM

## 2024-06-24 DIAGNOSIS — J34.3 HYPERTROPHY OF BOTH INFERIOR NASAL TURBINATES: ICD-10-CM

## 2024-06-24 PROCEDURE — 99244 OFF/OP CNSLTJ NEW/EST MOD 40: CPT | Performed by: OTOLARYNGOLOGY

## 2024-06-24 RX ORDER — PREDNISONE 20 MG/1
40 TABLET ORAL DAILY
Qty: 20 TABLET | Refills: 0 | Status: SHIPPED | OUTPATIENT
Start: 2024-06-24 | End: 2024-07-04

## 2024-06-24 ASSESSMENT — ENCOUNTER SYMPTOMS
EYES NEGATIVE: 1
ALLERGIC/IMMUNOLOGIC NEGATIVE: 1
SINUS PAIN: 1
RESPIRATORY NEGATIVE: 1
GASTROINTESTINAL NEGATIVE: 1
SINUS PRESSURE: 1

## 2024-06-24 NOTE — PROGRESS NOTES
Chief Complaint   Patient presents with    New Patient     Sinusitis/ pressure/pain. Has had 3 sinus infections this year        HPI:  Bud Sun is a 48 y.o. female seen today in initial consultation for her sinuses.  She reports recurring sinus infections, typically occurring about 3 times annually.  Her last infection was a couple of months ago, and most of her infections require a course of oral antibiotics.  She has a long history of suspected underlying allergies and uses Zyrtec, Singulair, and Flonase daily.  Even in between her infections, she remains relatively symptomatic with mainly left-sided nasal stuffiness, frontal headaches and intermittent earaches.  Even her sense of olfaction seems to have decreased over time.  No previous nasal surgery or trauma.  No recent imaging studies.  Her last course of antibiotics was about 2 months ago, and she has not been treated with any oral steroids recently.      Past Medical History, Past Surgical History, Family history, Social History, and Medications were all reviewed with the patient today and updated as necessary.     Allergies   Allergen Reactions    Tramadol Rash     Patient Active Problem List   Diagnosis    Spinal stenosis of lumbar region    Nightmare    Chest pain on breathing    Asthma dependent on inhaled steroids    Disorder of intervertebral disc    Cubital tunnel syndrome on left    Bipolar disorder, current episode mixed, moderate (HCC)    Muscle spasm    Anger reaction    Insomnia due to mental condition    Alternating constipation and diarrhea    Migraine with aura and without status migrainosus, not intractable    Dark stools    Chronic back pain    Overweight (BMI 25.0-29.9)    Spinal stenosis    Generalized anxiety disorder    Acute sinusitis    Chronic bilateral low back pain with bilateral sciatica    Chronic rhinitis    Moderate persistent asthma without complication    GERD (gastroesophageal reflux disease)    Drug-induced

## 2024-06-28 DIAGNOSIS — Z79.899 LONG-TERM USE OF HIGH-RISK MEDICATION: ICD-10-CM

## 2024-06-28 RX ORDER — MELOXICAM 7.5 MG/1
7.5 TABLET ORAL DAILY
Qty: 30 TABLET | Refills: 0 | OUTPATIENT
Start: 2024-06-28

## 2024-07-03 ENCOUNTER — OFFICE VISIT (OUTPATIENT)
Dept: OBGYN CLINIC | Age: 49
End: 2024-07-03
Payer: COMMERCIAL

## 2024-07-03 VITALS
SYSTOLIC BLOOD PRESSURE: 120 MMHG | HEIGHT: 67 IN | DIASTOLIC BLOOD PRESSURE: 72 MMHG | BODY MASS INDEX: 28.25 KG/M2 | WEIGHT: 180 LBS

## 2024-07-03 DIAGNOSIS — Z12.31 SCREENING MAMMOGRAM FOR BREAST CANCER: ICD-10-CM

## 2024-07-03 DIAGNOSIS — R30.0 DYSURIA: ICD-10-CM

## 2024-07-03 DIAGNOSIS — Z11.3 SCREEN FOR STD (SEXUALLY TRANSMITTED DISEASE): ICD-10-CM

## 2024-07-03 DIAGNOSIS — Z11.51 SCREENING FOR HUMAN PAPILLOMAVIRUS (HPV): ICD-10-CM

## 2024-07-03 DIAGNOSIS — Z01.419 WELL WOMAN EXAM WITH ROUTINE GYNECOLOGICAL EXAM: Primary | ICD-10-CM

## 2024-07-03 DIAGNOSIS — Z12.4 SCREENING FOR CERVICAL CANCER: ICD-10-CM

## 2024-07-03 LAB
BILIRUBIN, URINE, POC: NEGATIVE
BLOOD URINE, POC: ABNORMAL
GLUCOSE URINE, POC: NEGATIVE
KETONES, URINE, POC: NEGATIVE
LEUKOCYTE ESTERASE, URINE, POC: NEGATIVE
NITRITE, URINE, POC: ABNORMAL
PH, URINE, POC: 6.5 (ref 4.6–8)
PROTEIN,URINE, POC: ABNORMAL
SPECIFIC GRAVITY, URINE, POC: 1.01 (ref 1–1.03)
URINALYSIS CLARITY, POC: ABNORMAL
URINALYSIS COLOR, POC: ABNORMAL
UROBILINOGEN, POC: ABNORMAL

## 2024-07-03 PROCEDURE — 81003 URINALYSIS AUTO W/O SCOPE: CPT | Performed by: OBSTETRICS & GYNECOLOGY

## 2024-07-03 PROCEDURE — 99396 PREV VISIT EST AGE 40-64: CPT | Performed by: OBSTETRICS & GYNECOLOGY

## 2024-07-03 PROCEDURE — 99459 PELVIC EXAMINATION: CPT | Performed by: OBSTETRICS & GYNECOLOGY

## 2024-07-03 ASSESSMENT — ENCOUNTER SYMPTOMS
EYES NEGATIVE: 1
RESPIRATORY NEGATIVE: 1
GASTROINTESTINAL NEGATIVE: 1
ALLERGIC/IMMUNOLOGIC NEGATIVE: 1

## 2024-07-03 NOTE — PROGRESS NOTES
Comment:  managed with meds  No date: Arthralgia of multiple sites  No date: Asthma      Comment:  inhaler as needed- lastuse 6/7/23  No date: Bipolar 1 disorder (HCC)      Comment:  managed with meds  No date: Chronic low back pain  No date: Chronic pain      Comment:  lower back  No date: Depressive disorder  No date: Drug induced constipation  No date: Genital herpes  No date: Insomnia  No date: MRSA (methicillin resistant staph aureus) culture positive      Comment:  on skin of abd-- 2015--- was tx---pt states just staph                not MRSA  No date: Multiple joint pain  No date: Spinal stenosis  No date: Stomach ulcer      Comment:  Hx     Surgical History:  Past Surgical History:  No date: BACK SURGERY      Comment:  2017 no hardware  6/21/2023: COLONOSCOPY; N/A      Comment:  COLORECTAL CANCER SCREENING, NOT HIGH RISK performed by                Maribell Kahn MD at [facility] ENDOSCOPY  No date: LAP,TUBAL CAUTERY  6/21/2023: UPPER GASTROINTESTINAL ENDOSCOPY; N/A      Comment:  EGD DILATION BALLOON AND BIOPSY performed by Maribell Kahn MD at [facility] ENDOSCOPY     Meds:    Current Outpatient Medications:     predniSONE (DELTASONE) 20 MG tablet, Take 2 tablets by mouth daily for 10 days, Disp: 20 tablet, Rfl: 0    gabapentin (NEURONTIN) 300 MG capsule, Take 1 capsule by mouth 2 times daily as needed (anxiety) for up to 120 days., Disp: 60 capsule, Rfl: 3    meloxicam (MOBIC) 7.5 MG tablet, Take 1 tablet by mouth daily, Disp: 30 tablet, Rfl: 0    loratadine (CLARITIN) 10 MG capsule, Take 1 tablet by mouth daily., Disp: 90 capsule, Rfl: 0    montelukast (SINGULAIR) 10 MG tablet, Take 1 tablet by mouth nightly, Disp: 90 tablet, Rfl: 0    lamoTRIgine (LAMICTAL) 25 MG tablet, Take 1 tablet by mouth daily for 14 days Then increase to 2 tablets daily for 14 days., Disp: 42 tablet, Rfl: 0    traZODone (DESYREL) 100 MG tablet, Take 1 tablet by mouth nightly as needed for Sleep, Disp: 30 tablet, Rfl: 2

## 2024-07-06 LAB
BACTERIA SPEC CULT: NORMAL
SERVICE CMNT-IMP: NORMAL

## 2024-07-11 LAB
C TRACH RRNA CVX QL NAA+PROBE: NEGATIVE
COLLECTION METHOD: NORMAL
CYTOLOGIST CVX/VAG CYTO: NORMAL
CYTOLOGY CVX/VAG DOC THIN PREP: NORMAL
DATE OF LMP: NORMAL
HPV APTIMA: NEGATIVE
HPV GENOTYPE REFLEX: NORMAL
Lab: NORMAL
Lab: NORMAL
N GONORRHOEA RRNA CVX QL NAA+PROBE: NEGATIVE
OTHER PT INFO: NORMAL
PAP SOURCE: NORMAL
PATH REPORT.FINAL DX SPEC: NORMAL
PREV CYTO INFO: NORMAL
PREV TREATMENT RESULTS: NORMAL
PREV TREATMENT: NORMAL
STAT OF ADQ CVX/VAG CYTO-IMP: NORMAL
T VAGINALIS RRNA SPEC QL NAA+PROBE: NEGATIVE

## 2024-07-16 ENCOUNTER — OFFICE VISIT (OUTPATIENT)
Dept: BEHAVIORAL/MENTAL HEALTH CLINIC | Age: 49
End: 2024-07-16
Payer: COMMERCIAL

## 2024-07-16 VITALS
HEART RATE: 82 BPM | WEIGHT: 187.5 LBS | DIASTOLIC BLOOD PRESSURE: 80 MMHG | OXYGEN SATURATION: 98 % | SYSTOLIC BLOOD PRESSURE: 116 MMHG | BODY MASS INDEX: 29.37 KG/M2

## 2024-07-16 DIAGNOSIS — F41.1 GENERALIZED ANXIETY DISORDER: ICD-10-CM

## 2024-07-16 DIAGNOSIS — F43.10 PTSD (POST-TRAUMATIC STRESS DISORDER): Primary | ICD-10-CM

## 2024-07-16 DIAGNOSIS — F51.05 INSOMNIA DUE TO MENTAL CONDITION: ICD-10-CM

## 2024-07-16 DIAGNOSIS — F31.62 BIPOLAR DISORDER, CURRENT EPISODE MIXED, MODERATE (HCC): ICD-10-CM

## 2024-07-16 DIAGNOSIS — F41.9 ANXIETY: ICD-10-CM

## 2024-07-16 PROCEDURE — 99215 OFFICE O/P EST HI 40 MIN: CPT | Performed by: NURSE PRACTITIONER

## 2024-07-16 RX ORDER — LAMOTRIGINE 150 MG/1
150 TABLET ORAL DAILY
Qty: 30 TABLET | Refills: 3 | Status: SHIPPED | OUTPATIENT
Start: 2024-07-16 | End: 2024-11-13

## 2024-07-16 RX ORDER — TRAZODONE HYDROCHLORIDE 100 MG/1
150 TABLET ORAL NIGHTLY PRN
Qty: 45 TABLET | Refills: 3 | Status: SHIPPED | OUTPATIENT
Start: 2024-07-16 | End: 2024-11-13

## 2024-07-16 RX ORDER — GABAPENTIN 300 MG/1
300 CAPSULE ORAL 3 TIMES DAILY PRN
Qty: 90 CAPSULE | Refills: 3 | Status: SHIPPED | OUTPATIENT
Start: 2024-07-16 | End: 2024-11-13

## 2024-07-16 ASSESSMENT — PATIENT HEALTH QUESTIONNAIRE - PHQ9
2. FEELING DOWN, DEPRESSED OR HOPELESS: NEARLY EVERY DAY
SUM OF ALL RESPONSES TO PHQ9 QUESTIONS 1 & 2: 6
4. FEELING TIRED OR HAVING LITTLE ENERGY: NEARLY EVERY DAY
SUM OF ALL RESPONSES TO PHQ QUESTIONS 1-9: 18
8. MOVING OR SPEAKING SO SLOWLY THAT OTHER PEOPLE COULD HAVE NOTICED. OR THE OPPOSITE, BEING SO FIGETY OR RESTLESS THAT YOU HAVE BEEN MOVING AROUND A LOT MORE THAN USUAL: NOT AT ALL
SUM OF ALL RESPONSES TO PHQ QUESTIONS 1-9: 18
SUM OF ALL RESPONSES TO PHQ QUESTIONS 1-9: 18
7. TROUBLE CONCENTRATING ON THINGS, SUCH AS READING THE NEWSPAPER OR WATCHING TELEVISION: NEARLY EVERY DAY
1. LITTLE INTEREST OR PLEASURE IN DOING THINGS: NEARLY EVERY DAY
6. FEELING BAD ABOUT YOURSELF - OR THAT YOU ARE A FAILURE OR HAVE LET YOURSELF OR YOUR FAMILY DOWN: MORE THAN HALF THE DAYS
5. POOR APPETITE OR OVEREATING: SEVERAL DAYS
3. TROUBLE FALLING OR STAYING ASLEEP: NEARLY EVERY DAY
SUM OF ALL RESPONSES TO PHQ QUESTIONS 1-9: 18
9. THOUGHTS THAT YOU WOULD BE BETTER OFF DEAD, OR OF HURTING YOURSELF: NOT AT ALL
10. IF YOU CHECKED OFF ANY PROBLEMS, HOW DIFFICULT HAVE THESE PROBLEMS MADE IT FOR YOU TO DO YOUR WORK, TAKE CARE OF THINGS AT HOME, OR GET ALONG WITH OTHER PEOPLE: VERY DIFFICULT

## 2024-07-16 NOTE — PROGRESS NOTES
regarding ongoing psychiatric illness.      Claudia Forman, APRN - CNP   [facility]

## 2024-08-08 DIAGNOSIS — F51.05 INSOMNIA DUE TO MENTAL CONDITION: ICD-10-CM

## 2024-08-08 RX ORDER — TRAZODONE HYDROCHLORIDE 100 MG/1
150 TABLET ORAL NIGHTLY PRN
Qty: 135 TABLET | Refills: 2 | OUTPATIENT
Start: 2024-08-08 | End: 2024-12-06

## 2024-09-10 ENCOUNTER — OFFICE VISIT (OUTPATIENT)
Dept: FAMILY MEDICINE CLINIC | Facility: CLINIC | Age: 49
End: 2024-09-10

## 2024-09-10 VITALS
WEIGHT: 180 LBS | DIASTOLIC BLOOD PRESSURE: 79 MMHG | HEART RATE: 77 BPM | HEIGHT: 67 IN | TEMPERATURE: 98.4 F | OXYGEN SATURATION: 99 % | SYSTOLIC BLOOD PRESSURE: 125 MMHG | BODY MASS INDEX: 28.25 KG/M2

## 2024-09-10 DIAGNOSIS — R25.2 LEG CRAMPS: ICD-10-CM

## 2024-09-10 DIAGNOSIS — R52 GENERALIZED BODY ACHES: Primary | ICD-10-CM

## 2024-09-10 DIAGNOSIS — J01.01 ACUTE RECURRENT MAXILLARY SINUSITIS: ICD-10-CM

## 2024-09-10 DIAGNOSIS — Z00.00 PHYSICAL EXAM: ICD-10-CM

## 2024-09-10 DIAGNOSIS — R09.81 CONGESTION OF NASAL SINUS: ICD-10-CM

## 2024-09-10 LAB
ALBUMIN SERPL-MCNC: 4.1 G/DL (ref 3.5–5)
ALBUMIN/GLOB SERPL: 1.2 (ref 1–1.9)
ALP SERPL-CCNC: 54 U/L (ref 35–104)
ALT SERPL-CCNC: 11 U/L (ref 12–65)
ANION GAP SERPL CALC-SCNC: 11 MMOL/L (ref 9–18)
AST SERPL-CCNC: 20 U/L (ref 15–37)
BILIRUB SERPL-MCNC: 0.2 MG/DL (ref 0–1.2)
BUN SERPL-MCNC: 12 MG/DL (ref 6–23)
CALCIUM SERPL-MCNC: 9.5 MG/DL (ref 8.8–10.2)
CHLORIDE SERPL-SCNC: 104 MMOL/L (ref 98–107)
CO2 SERPL-SCNC: 25 MMOL/L (ref 20–28)
CREAT SERPL-MCNC: 0.88 MG/DL (ref 0.6–1.1)
EXP DATE SOLUTION: NORMAL
EXP DATE SWAB: NORMAL
EXPIRATION DATE: NORMAL
GLOBULIN SER CALC-MCNC: 3.3 G/DL (ref 2.3–3.5)
GLUCOSE SERPL-MCNC: 82 MG/DL (ref 70–99)
INFLUENZA A ANTIGEN, POC: NEGATIVE
INFLUENZA B ANTIGEN, POC: NEGATIVE
LOT NUMBER POC: NORMAL
LOT NUMBER SOLUTION: NORMAL
LOT NUMBER SWAB: NORMAL
MAGNESIUM SERPL-MCNC: 2 MG/DL (ref 1.8–2.4)
POTASSIUM SERPL-SCNC: 4.4 MMOL/L (ref 3.5–5.1)
PROT SERPL-MCNC: 7.4 G/DL (ref 6.3–8.2)
SARS-COV-2 RNA, POC: NEGATIVE
SODIUM SERPL-SCNC: 140 MMOL/L (ref 136–145)
VALID INTERNAL CONTROL, POC: NORMAL

## 2024-09-10 RX ORDER — FLUCONAZOLE 150 MG/1
150 TABLET ORAL ONCE
Qty: 1 TABLET | Refills: 0 | Status: SHIPPED | OUTPATIENT
Start: 2024-09-10 | End: 2024-09-10

## 2024-09-10 ASSESSMENT — ENCOUNTER SYMPTOMS
GASTROINTESTINAL NEGATIVE: 1
SINUS PAIN: 1
SINUS PRESSURE: 1
SORE THROAT: 1
ALLERGIC/IMMUNOLOGIC NEGATIVE: 1
EYES NEGATIVE: 1
COUGH: 1

## 2024-09-11 RX ORDER — ACYCLOVIR 400 MG/1
TABLET ORAL
Qty: 180 TABLET | Refills: 4 | Status: SHIPPED | OUTPATIENT
Start: 2024-09-11

## 2024-10-12 DIAGNOSIS — F31.62 BIPOLAR DISORDER, CURRENT EPISODE MIXED, MODERATE (HCC): ICD-10-CM

## 2024-10-14 RX ORDER — LAMOTRIGINE 150 MG/1
150 TABLET ORAL DAILY
Qty: 90 TABLET | Refills: 1 | OUTPATIENT
Start: 2024-10-14

## 2024-10-28 ENCOUNTER — TELEPHONE (OUTPATIENT)
Dept: FAMILY MEDICINE CLINIC | Facility: CLINIC | Age: 49
End: 2024-10-28

## 2024-10-28 NOTE — TELEPHONE ENCOUNTER
The paperwork for DSS needs to be faxed to a new fax number [phone] ATNN: [facility]

## 2024-11-20 ENCOUNTER — OFFICE VISIT (OUTPATIENT)
Dept: BEHAVIORAL/MENTAL HEALTH CLINIC | Age: 49
End: 2024-11-20
Payer: COMMERCIAL

## 2024-11-20 VITALS
BODY MASS INDEX: 27.91 KG/M2 | SYSTOLIC BLOOD PRESSURE: 124 MMHG | OXYGEN SATURATION: 98 % | WEIGHT: 178.2 LBS | HEART RATE: 67 BPM | DIASTOLIC BLOOD PRESSURE: 76 MMHG

## 2024-11-20 DIAGNOSIS — F41.1 GENERALIZED ANXIETY DISORDER: ICD-10-CM

## 2024-11-20 DIAGNOSIS — F51.05 INSOMNIA DUE TO MENTAL CONDITION: ICD-10-CM

## 2024-11-20 DIAGNOSIS — F31.62 BIPOLAR DISORDER, CURRENT EPISODE MIXED, MODERATE (HCC): Primary | ICD-10-CM

## 2024-11-20 DIAGNOSIS — F41.9 ANXIETY: ICD-10-CM

## 2024-11-20 PROCEDURE — 99215 OFFICE O/P EST HI 40 MIN: CPT | Performed by: NURSE PRACTITIONER

## 2024-11-20 RX ORDER — LAMOTRIGINE 150 MG/1
150 TABLET ORAL DAILY
Qty: 30 TABLET | Refills: 3 | Status: SHIPPED | OUTPATIENT
Start: 2024-11-20 | End: 2025-03-20

## 2024-11-20 RX ORDER — GABAPENTIN 300 MG/1
300 CAPSULE ORAL 3 TIMES DAILY PRN
Qty: 90 CAPSULE | Refills: 3 | Status: SHIPPED | OUTPATIENT
Start: 2024-11-20 | End: 2025-03-20

## 2024-11-20 ASSESSMENT — PATIENT HEALTH QUESTIONNAIRE - PHQ9
9. THOUGHTS THAT YOU WOULD BE BETTER OFF DEAD, OR OF HURTING YOURSELF: NOT AT ALL
2. FEELING DOWN, DEPRESSED OR HOPELESS: NOT AT ALL
6. FEELING BAD ABOUT YOURSELF - OR THAT YOU ARE A FAILURE OR HAVE LET YOURSELF OR YOUR FAMILY DOWN: NOT AT ALL
10. IF YOU CHECKED OFF ANY PROBLEMS, HOW DIFFICULT HAVE THESE PROBLEMS MADE IT FOR YOU TO DO YOUR WORK, TAKE CARE OF THINGS AT HOME, OR GET ALONG WITH OTHER PEOPLE: SOMEWHAT DIFFICULT
SUM OF ALL RESPONSES TO PHQ QUESTIONS 1-9: 7
SUM OF ALL RESPONSES TO PHQ9 QUESTIONS 1 & 2: 0
5. POOR APPETITE OR OVEREATING: NOT AT ALL
1. LITTLE INTEREST OR PLEASURE IN DOING THINGS: NOT AT ALL
7. TROUBLE CONCENTRATING ON THINGS, SUCH AS READING THE NEWSPAPER OR WATCHING TELEVISION: SEVERAL DAYS
SUM OF ALL RESPONSES TO PHQ QUESTIONS 1-9: 7
SUM OF ALL RESPONSES TO PHQ QUESTIONS 1-9: 7
4. FEELING TIRED OR HAVING LITTLE ENERGY: NEARLY EVERY DAY
SUM OF ALL RESPONSES TO PHQ QUESTIONS 1-9: 7
8. MOVING OR SPEAKING SO SLOWLY THAT OTHER PEOPLE COULD HAVE NOTICED. OR THE OPPOSITE, BEING SO FIGETY OR RESTLESS THAT YOU HAVE BEEN MOVING AROUND A LOT MORE THAN USUAL: NOT AT ALL
3. TROUBLE FALLING OR STAYING ASLEEP: NEARLY EVERY DAY

## 2024-11-20 NOTE — PROGRESS NOTES
OUTPATIENT PSYCHIATRIC RETURN VISIT PROGRESS NOTE      --Claudia Forman, APRN - CNP on 11/20/2024 at 11:50 AM    An electronic signature was used to authenticate this note.   Date of Service: 11/20/2024     Identification    Bud Sun is a 49 y.o.  with a past psychiatric history of bipolar d/o, insomnia. VICTOR M,  who presents today for a psychiatric follow up appointment.      CC:  Routine medication management follow up.    Chief Complaint   Patient presents with    Follow-up     4 month f/u, needs paperwork for DSS for watching her grandson        Subjective / Interval History:    Pt comes to clinic today and reports that  mood is stabilizing on current medication regimen and patient is tolerating current medications with no adverse effects.She is setting healthy boundaries at work and doing well. She is taking medication as prescribed and finds that her mood has stabilized. She is committed to and engaging in self-care which has been of great benefit to her mental health.  She is sleeping well. She denies symptoms of depression,anxiety, wolf or hypo wolf.   She does not require therapy at this time but is open to referral in the future if needed.  She is recommended to be  of her grandson at this time.   Pt has been medication compliant and denies any side-effects. Pt denies SI, HI and AVH. Does not endorse any manic or psychotic symptoms.    Psychiatric Review Of Systems:  Sleep: generally restful sleep  Appetite: ok  Current suicidal/homicidal ideations: Denies SI/ HI  Current auditory/visual hallucinations: Denies     Medications:    Current Outpatient Medications:     cariprazine hcl (VRAYLAR) 1.5 MG capsule, Take 1 capsule by mouth daily, Disp: 90 capsule, Rfl: 1    acyclovir (ZOVIRAX) 400 MG tablet, TAKE 1 TO 2 TABLETS BY MOUTH EVERY DAY, Disp: 180 tablet, Rfl: 4    gabapentin (NEURONTIN) 300 MG capsule, Take 1 capsule by mouth 3 times daily as needed (anxiety) for up to 120 days., Disp:

## 2024-11-27 DIAGNOSIS — F31.62 BIPOLAR DISORDER, CURRENT EPISODE MIXED, MODERATE (HCC): ICD-10-CM

## 2024-11-27 RX ORDER — LAMOTRIGINE 150 MG/1
150 TABLET ORAL DAILY
Qty: 90 TABLET | Refills: 1 | OUTPATIENT
Start: 2024-11-27

## 2024-12-20 ENCOUNTER — HOSPITAL ENCOUNTER (OUTPATIENT)
Dept: MAMMOGRAPHY | Age: 49
Discharge: HOME | End: 2024-12-23
Attending: OBSTETRICS & GYNECOLOGY
Payer: COMMERCIAL

## 2024-12-20 VITALS — BODY MASS INDEX: 26.68 KG/M2 | HEIGHT: 67 IN | WEIGHT: 170 LBS

## 2024-12-20 DIAGNOSIS — Z12.31 SCREENING MAMMOGRAM FOR BREAST CANCER: ICD-10-CM

## 2024-12-20 DIAGNOSIS — Z01.419 WELL WOMAN EXAM WITH ROUTINE GYNECOLOGICAL EXAM: ICD-10-CM

## 2024-12-20 PROCEDURE — 77063 BREAST TOMOSYNTHESIS BI: CPT

## 2025-02-11 ENCOUNTER — OFFICE VISIT (OUTPATIENT)
Dept: FAMILY MEDICINE CLINIC | Facility: CLINIC | Age: 50
End: 2025-02-11
Payer: COMMERCIAL

## 2025-02-11 VITALS
HEART RATE: 76 BPM | HEIGHT: 67 IN | DIASTOLIC BLOOD PRESSURE: 64 MMHG | SYSTOLIC BLOOD PRESSURE: 100 MMHG | BODY MASS INDEX: 27.15 KG/M2 | WEIGHT: 173 LBS | TEMPERATURE: 98.1 F | OXYGEN SATURATION: 99 %

## 2025-02-11 DIAGNOSIS — R00.2 HEART PALPITATIONS: ICD-10-CM

## 2025-02-11 DIAGNOSIS — R09.81 NASAL CONGESTION: Primary | ICD-10-CM

## 2025-02-11 DIAGNOSIS — J45.909 ASTHMA DUE TO SEASONAL ALLERGIES: ICD-10-CM

## 2025-02-11 LAB
QUICKVUE INFLUENZA TEST: NEGATIVE
VALID INTERNAL CONTROL, POC: NORMAL

## 2025-02-11 PROCEDURE — 87804 INFLUENZA ASSAY W/OPTIC: CPT | Performed by: NURSE PRACTITIONER

## 2025-02-11 PROCEDURE — 93000 ELECTROCARDIOGRAM COMPLETE: CPT | Performed by: NURSE PRACTITIONER

## 2025-02-11 PROCEDURE — 99214 OFFICE O/P EST MOD 30 MIN: CPT | Performed by: NURSE PRACTITIONER

## 2025-02-11 RX ORDER — MONTELUKAST SODIUM 10 MG/1
10 TABLET ORAL NIGHTLY
Qty: 90 TABLET | Refills: 0 | Status: SHIPPED | OUTPATIENT
Start: 2025-02-11

## 2025-02-11 RX ORDER — BUDESONIDE AND FORMOTEROL FUMARATE DIHYDRATE 160; 4.5 UG/1; UG/1
2 AEROSOL RESPIRATORY (INHALATION) 2 TIMES DAILY
Qty: 10.2 G | Refills: 3 | Status: SHIPPED | OUTPATIENT
Start: 2025-02-11

## 2025-02-11 RX ORDER — ALBUTEROL SULFATE 90 UG/1
2 INHALANT RESPIRATORY (INHALATION) EVERY 4 HOURS PRN
Qty: 18 G | Refills: 1 | Status: SHIPPED | OUTPATIENT
Start: 2025-02-11

## 2025-02-11 SDOH — ECONOMIC STABILITY: FOOD INSECURITY: WITHIN THE PAST 12 MONTHS, YOU WORRIED THAT YOUR FOOD WOULD RUN OUT BEFORE YOU GOT MONEY TO BUY MORE.: PATIENT DECLINED

## 2025-02-11 SDOH — ECONOMIC STABILITY: FOOD INSECURITY: WITHIN THE PAST 12 MONTHS, THE FOOD YOU BOUGHT JUST DIDN'T LAST AND YOU DIDN'T HAVE MONEY TO GET MORE.: PATIENT DECLINED

## 2025-02-11 ASSESSMENT — ENCOUNTER SYMPTOMS
SORE THROAT: 1
RHINORRHEA: 1
GASTROINTESTINAL NEGATIVE: 1
EYES NEGATIVE: 1
ALLERGIC/IMMUNOLOGIC NEGATIVE: 1
RESPIRATORY NEGATIVE: 1

## 2025-02-11 ASSESSMENT — PATIENT HEALTH QUESTIONNAIRE - PHQ9
SUM OF ALL RESPONSES TO PHQ9 QUESTIONS 1 & 2: 0
SUM OF ALL RESPONSES TO PHQ QUESTIONS 1-9: 7
SUM OF ALL RESPONSES TO PHQ QUESTIONS 1-9: 7
1. LITTLE INTEREST OR PLEASURE IN DOING THINGS: NOT AT ALL
SUM OF ALL RESPONSES TO PHQ QUESTIONS 1-9: 7
3. TROUBLE FALLING OR STAYING ASLEEP: NEARLY EVERY DAY
9. THOUGHTS THAT YOU WOULD BE BETTER OFF DEAD, OR OF HURTING YOURSELF: NOT AT ALL
10. IF YOU CHECKED OFF ANY PROBLEMS, HOW DIFFICULT HAVE THESE PROBLEMS MADE IT FOR YOU TO DO YOUR WORK, TAKE CARE OF THINGS AT HOME, OR GET ALONG WITH OTHER PEOPLE: SOMEWHAT DIFFICULT
4. FEELING TIRED OR HAVING LITTLE ENERGY: NEARLY EVERY DAY
2. FEELING DOWN, DEPRESSED OR HOPELESS: NOT AT ALL
8. MOVING OR SPEAKING SO SLOWLY THAT OTHER PEOPLE COULD HAVE NOTICED. OR THE OPPOSITE, BEING SO FIGETY OR RESTLESS THAT YOU HAVE BEEN MOVING AROUND A LOT MORE THAN USUAL: NOT AT ALL
7. TROUBLE CONCENTRATING ON THINGS, SUCH AS READING THE NEWSPAPER OR WATCHING TELEVISION: SEVERAL DAYS
SUM OF ALL RESPONSES TO PHQ QUESTIONS 1-9: 7
5. POOR APPETITE OR OVEREATING: NOT AT ALL

## 2025-02-11 NOTE — PROGRESS NOTES
[facility] [address]  [place] [address]   (ph) [phone] (fax) [phone]  ALLEGRA Diana      Chief Complaint   Patient presents with    Pharyngitis     Started weeks ago getting worst.     Nasal Congestion     Started last night     dry eyes       49-year-old female comes into the office today complaining of a sore throat.  She reports that the sore throat started weeks ago and it is getting worse.  She has also had some nasal congestion and feels that she has palpitations.  She reports a history of asthma; however, she is no longer on her Advair and at times she does have bronchospasms.  She uses her albuterol as needed.  She reports a history of anxiety but does not feel that the palpitations are related to her anxiety.         Allergies   Allergen Reactions    Tramadol Rash       Past Medical History:   Diagnosis Date    Acid reflux     managed with meds    Anemia     Anxiety     managed with meds    Arthralgia of multiple sites     Asthma     inhaler as needed- lastuse 6/7/23    Bipolar 1 disorder (HCC)     managed with meds    Chronic low back pain     Chronic pain     lower back    Depressive disorder     Drug induced constipation     Genital herpes     Insomnia     MRSA (methicillin resistant staph aureus) culture positive     on skin of abd-- 2015--- was tx---pt states just staph not MRSA    Multiple joint pain     Spinal stenosis     Stomach ulcer     Hx       Family History   Problem Relation Age of Onset    Diabetes Sister     Hypertension Sister     Cancer Father     Colon Cancer Father     Hypertension Mother        Social History     Socioeconomic History    Marital status: Single     Spouse name: Not on file    Number of children: Not on file    Years of education: Not on file    Highest education level: Not on file   Occupational History    Not on file   Tobacco Use    Smoking status: Never     Passive exposure: Never

## 2025-03-17 ENCOUNTER — TELEMEDICINE (OUTPATIENT)
Dept: BEHAVIORAL/MENTAL HEALTH CLINIC | Age: 50
End: 2025-03-17
Payer: COMMERCIAL

## 2025-03-17 DIAGNOSIS — F41.9 ANXIETY: ICD-10-CM

## 2025-03-17 DIAGNOSIS — F31.62 BIPOLAR DISORDER, CURRENT EPISODE MIXED, MODERATE (HCC): Primary | ICD-10-CM

## 2025-03-17 DIAGNOSIS — F51.05 INSOMNIA DUE TO MENTAL CONDITION: ICD-10-CM

## 2025-03-17 DIAGNOSIS — F41.1 GENERALIZED ANXIETY DISORDER: ICD-10-CM

## 2025-03-17 PROCEDURE — 99215 OFFICE O/P EST HI 40 MIN: CPT | Performed by: NURSE PRACTITIONER

## 2025-03-17 RX ORDER — LAMOTRIGINE 150 MG/1
150 TABLET ORAL DAILY
Qty: 30 TABLET | Refills: 3 | Status: SHIPPED | OUTPATIENT
Start: 2025-03-17 | End: 2025-07-15

## 2025-03-17 RX ORDER — TRAZODONE HYDROCHLORIDE 100 MG/1
200 TABLET ORAL NIGHTLY PRN
Qty: 60 TABLET | Refills: 3 | Status: SHIPPED | OUTPATIENT
Start: 2025-03-17 | End: 2025-07-15

## 2025-03-17 RX ORDER — GABAPENTIN 300 MG/1
300 CAPSULE ORAL 3 TIMES DAILY PRN
Qty: 90 CAPSULE | Refills: 3 | Status: SHIPPED | OUTPATIENT
Start: 2025-03-17 | End: 2025-07-15

## 2025-03-17 ASSESSMENT — PATIENT HEALTH QUESTIONNAIRE - PHQ9
SUM OF ALL RESPONSES TO PHQ QUESTIONS 1-9: 7
SUM OF ALL RESPONSES TO PHQ QUESTIONS 1-9: 7
6. FEELING BAD ABOUT YOURSELF - OR THAT YOU ARE A FAILURE OR HAVE LET YOURSELF OR YOUR FAMILY DOWN: NOT AT ALL
4. FEELING TIRED OR HAVING LITTLE ENERGY: NEARLY EVERY DAY
2. FEELING DOWN, DEPRESSED OR HOPELESS: NOT AT ALL
SUM OF ALL RESPONSES TO PHQ QUESTIONS 1-9: 7
9. THOUGHTS THAT YOU WOULD BE BETTER OFF DEAD, OR OF HURTING YOURSELF: NOT AT ALL
3. TROUBLE FALLING OR STAYING ASLEEP: NEARLY EVERY DAY
7. TROUBLE CONCENTRATING ON THINGS, SUCH AS READING THE NEWSPAPER OR WATCHING TELEVISION: SEVERAL DAYS
SUM OF ALL RESPONSES TO PHQ QUESTIONS 1-9: 7
8. MOVING OR SPEAKING SO SLOWLY THAT OTHER PEOPLE COULD HAVE NOTICED. OR THE OPPOSITE, BEING SO FIGETY OR RESTLESS THAT YOU HAVE BEEN MOVING AROUND A LOT MORE THAN USUAL: NOT AT ALL
1. LITTLE INTEREST OR PLEASURE IN DOING THINGS: NOT AT ALL
5. POOR APPETITE OR OVEREATING: NOT AT ALL
10. IF YOU CHECKED OFF ANY PROBLEMS, HOW DIFFICULT HAVE THESE PROBLEMS MADE IT FOR YOU TO DO YOUR WORK, TAKE CARE OF THINGS AT HOME, OR GET ALONG WITH OTHER PEOPLE: SOMEWHAT DIFFICULT

## 2025-03-17 NOTE — PROGRESS NOTES
OUTPATIENT PSYCHIATRIC RETURN VISIT PROGRESS NOTE  Bud Sun, was evaluated through a synchronous (real-time) audio-video encounter. The patient (or guardian if applicable) is aware that this is a billable service, which includes applicable co-pays. This Virtual Visit was conducted with patient's (and/or legal guardian's) consent. Patient identification was verified, and a caregiver was present when appropriate.   The patient was located at Home: [address]  [facility] [address]  Provider was located at Facility (Appt Dept): [address] [facility] [address]  [place] [id number]  Confirm you are appropriately licensed, registered, or certified to deliver care in the state where the patient is located as indicated above. If you are not or unsure, please re-schedule the visit: Yes, I confirm.        45 minutes total time spent for this encounter: face to face with the patient with more than 50% of the total time spent on education, counseling, & coordination of care of the patient regarding ongoing psychiatric illness.    --Claudia Forman, KARMA - CNP on 3/17/2025 at 3:07 PM    An electronic signature was used to authenticate this note.   Date of Service: 3/17/2025     Identification    Bud Sun is a 49 y.o.  with a past psychiatric history of bipolar d/o, insomnia , VICTOR M  who presents today for a psychiatric follow up appointment.      CC:  Routine medication management follow up.    Chief Complaint   Patient presents with    Follow-up     4 month f/u        Subjective / Interval History:    Pt visit via My Chart  today and reports that Patient's mood is stabilizing on current medication regimen and patient is tolerating current medications with no adverse effects. Lamictal, Vraylar remain effective for mood stabilization. Gabapentin effective for anxiety. Will increase Trazodone to 200 mg HS for insomnia.  . Pt has been medication compliant and denies any side-effects. Pt denies SI, HI and AVH.

## 2025-03-31 DIAGNOSIS — F51.05 INSOMNIA DUE TO MENTAL CONDITION: ICD-10-CM

## 2025-03-31 RX ORDER — TRAZODONE HYDROCHLORIDE 100 MG/1
200 TABLET ORAL NIGHTLY PRN
Qty: 180 TABLET | Refills: 2 | OUTPATIENT
Start: 2025-03-31 | End: 2025-07-29

## 2025-04-26 NOTE — PROGRESS NOTES
[facility] CARDIOLOGY  [address] [place] [address], [address]  [place] [address]  PHONE: [phone]        25        NAME:  Bud Sun  : 1975  MRN: [id number]     Asthma  Gerd  Bipolar  Chronic pain    CHIEF COMPLAINT:    Consultation and Palpitations      SUBJECTIVE:     50 yo referred for palpitation. Pt today however denies palpitation and states that her complaint is really a substernal precordial squeezing. Usually q day. Some assoc left arm discomfort. Lots of stress. + Gerd. Non smoker. Negative Fhx for ascvd.       Medications were all reviewed with the patient today and updated as necessary.   Current Outpatient Medications   Medication Sig    lamoTRIgine (LAMICTAL) 150 MG tablet Take 1 tablet by mouth daily    gabapentin (NEURONTIN) 300 MG capsule Take 1 capsule by mouth 3 times daily as needed (anxiety) for up to 120 days.    traZODone (DESYREL) 100 MG tablet Take 2 tablets by mouth nightly as needed for Sleep    montelukast (SINGULAIR) 10 MG tablet Take 1 tablet by mouth nightly    albuterol sulfate HFA (PROVENTIL;VENTOLIN;PROAIR) 108 (90 Base) MCG/ACT inhaler Inhale 2 puffs into the lungs every 4 hours as needed for Shortness of Breath    budesonide-formoterol (SYMBICORT) 160-4.5 MCG/ACT AERO Inhale 2 puffs into the lungs 2 times daily    acyclovir (ZOVIRAX) 400 MG tablet TAKE 1 TO 2 TABLETS BY MOUTH EVERY DAY    meloxicam (MOBIC) 7.5 MG tablet Take 1 tablet by mouth daily    loratadine (CLARITIN) 10 MG capsule Take 1 tablet by mouth daily.    clotrimazole-betamethasone (LOTRISONE) 1-0.05 % cream Apply topically 2 times daily.    ondansetron (ZOFRAN) 4 MG tablet Take 1 tablet by mouth daily as needed    tiZANidine (ZANAFLEX) 4 MG tablet Take 1 tablet by mouth 3 times daily as needed    omeprazole (PRILOSEC) 40 MG delayed release capsule Take 1 capsule by mouth daily    Multiple Vitamins-Minerals (MULTIVITAMIN ADULT, MINERALS,) TABS Take by mouth daily    sucralfate (CARAFATE) 1

## 2025-04-28 ENCOUNTER — INITIAL CONSULT (OUTPATIENT)
Age: 50
End: 2025-04-28
Payer: COMMERCIAL

## 2025-04-28 VITALS
HEART RATE: 70 BPM | BODY MASS INDEX: 27.48 KG/M2 | HEIGHT: 67 IN | WEIGHT: 175.1 LBS | DIASTOLIC BLOOD PRESSURE: 74 MMHG | SYSTOLIC BLOOD PRESSURE: 126 MMHG

## 2025-04-28 DIAGNOSIS — R07.2 PRECORDIAL PAIN: Primary | ICD-10-CM

## 2025-04-28 PROCEDURE — 99203 OFFICE O/P NEW LOW 30 MIN: CPT | Performed by: INTERNAL MEDICINE

## 2025-05-18 DIAGNOSIS — J45.909 ASTHMA DUE TO SEASONAL ALLERGIES: ICD-10-CM

## 2025-05-19 RX ORDER — MONTELUKAST SODIUM 10 MG/1
10 TABLET ORAL NIGHTLY
Qty: 90 TABLET | Refills: 0 | OUTPATIENT
Start: 2025-05-19

## (undated) DEVICE — DRAPE TWL SURG 16X26IN BLU ORB04] ALLCARE INC]

## (undated) DEVICE — DRAPE SHT 3 QTR PROXIMA 53X77 --

## (undated) DEVICE — KENDALL RADIOLUCENT FOAM MONITORING ELECTRODE RECTANGULAR SHAPE: Brand: KENDALL

## (undated) DEVICE — INTENDED FOR TISSUE SEPARATION, AND OTHER PROCEDURES THAT REQUIRE A SHARP SURGICAL BLADE TO PUNCTURE OR CUT.: Brand: BARD-PARKER SAFETY BLADES SIZE 15, STERILE

## (undated) DEVICE — 5.0MM PRECISION ROUND

## (undated) DEVICE — HERCULES 3 STAGE BALLOON ESOPHAGEAL: Brand: HERCULES

## (undated) DEVICE — (D)PREP SKN CHLRAPRP APPL 26ML -- CONVERT TO ITEM 371833

## (undated) DEVICE — PACK PROCEDURE SURG POST LAMINECTOMY CDS

## (undated) DEVICE — GOWN,REINF,POLY,ECL,PP SLV,XL: Brand: MEDLINE

## (undated) DEVICE — WAX SURG 2.5GM HEMSTAT BNE BEESWAX PARAFFIN ISO PALMITATE

## (undated) DEVICE — FLOSEAL HEMOSTATIC MATRIX, 5 ML: Brand: FLOSEAL

## (undated) DEVICE — YANKAUER,BULB TIP,W/O VENT,RIGID,STERILE: Brand: MEDLINE

## (undated) DEVICE — (D)SYR 10ML 1/5ML GRAD NSAF -- PKGING CHANGE USE ITEM 338027

## (undated) DEVICE — CONNECTOR TBNG OD5-7MM O2 END DISP

## (undated) DEVICE — 3M™ TEGADERM™ TRANSPARENT FILM DRESSING FRAME STYLE, 1626W, 4 IN X 4-3/4 IN (10 CM X 12 CM), 50/CT 4CT/CASE: Brand: 3M™ TEGADERM™

## (undated) DEVICE — 1010 S-DRAPE TOWEL DRAPE 10/BX: Brand: STERI-DRAPE™

## (undated) DEVICE — SYRINGE MED 10ML LUERLOCK TIP W/O SFTY DISP

## (undated) DEVICE — AIRLIFE™ OXYGEN TUBING 7 FEET (2.1 M) CRUSH RESISTANT OXYGEN TUBING, VINYL TIPPED: Brand: AIRLIFE™

## (undated) DEVICE — BLOCK BITE AD 60FR W/ VELC STRP ADDRESSES MOST PT AND

## (undated) DEVICE — 3M™ STERI-STRIP™ REINFORCED ADHESIVE SKIN CLOSURES, R1548, 1 IN X 5 IN (25 MM X 125 MM), 4 STRIPS/ENVELOPE: Brand: 3M™ STERI-STRIP™

## (undated) DEVICE — SUTURE VCRL SZ 1 L27IN ABSRB UD L36MM CP-1 1/2 CIR REV CUT J268H

## (undated) DEVICE — AMD ANTIMICROBIAL GAUZE SPONGES,12 PLY USP TYPE VII, 0.2% POLYHEXAMETHYLENE BIGUANIDE HCI (PHMB): Brand: CURITY

## (undated) DEVICE — NEEDLE HYPO 21GA L1.5IN INTRAMUSCULAR S STL LATCH BVL UP

## (undated) DEVICE — MASTISOL ADHESIVE LIQ 2/3ML

## (undated) DEVICE — SOLUTION IV 1000ML 0.9% SOD CHL

## (undated) DEVICE — SUTURE VCRL + SZ 3-0 L18IN ABSRB UD PS-2 3/8 CIR REV CUT VCP497H

## (undated) DEVICE — SURGIFOAM SPNG SZ 100

## (undated) DEVICE — FORCEPS BX L240CM JAW DIA2.8MM L CAP W/ NDL MIC MESH TOOTH

## (undated) DEVICE — NEEDLE SYR 18GA L1.5IN RED PLAS HUB S STL BLNT FILL W/O

## (undated) DEVICE — GAUZE,SPONGE,4"X4",12PLY,WOVEN,NS,LF: Brand: MEDLINE

## (undated) DEVICE — DRAPE XR C ARM 41X74IN LF --

## (undated) DEVICE — CONTAINER FORMALIN PREFILLED 10% NBF 60ML

## (undated) DEVICE — SYRINGE MED 3ML CLR PLAS STD N CTRL LUERLOCK TIP DISP

## (undated) DEVICE — BUTTON SWITCH PENCIL BLADE ELECTRODE, HOLSTER: Brand: EDGE

## (undated) DEVICE — SUTURE VCRL SZ 2-0 L27IN ABSRB UD L36MM CP-1 1/2 CIR REV J266H

## (undated) DEVICE — REM POLYHESIVE ADULT PATIENT RETURN ELECTRODE: Brand: VALLEYLAB

## (undated) DEVICE — SYRINGE, LUER SLIP, STERILE, 60ML: Brand: MEDLINE

## (undated) DEVICE — 2000CC GUARDIAN II: Brand: GUARDIAN

## (undated) DEVICE — SINGLE PORT MANIFOLD: Brand: NEPTUNE 2

## (undated) DEVICE — LUBE JELLY FOIL PACK 1.4 OZ: Brand: MEDLINE INDUSTRIES, INC.

## (undated) DEVICE — KENDALL SCD EXPRESS SLEEVES, KNEE LENGTH, MEDIUM: Brand: KENDALL SCD

## (undated) DEVICE — CANNULA NSL ORAL AD FOR CAPNOFLEX CO2 O2 AIRLFE

## (undated) DEVICE — INTENDED FOR TISSUE SEPARATION, AND OTHER PROCEDURES THAT REQUIRE A SHARP SURGICAL BLADE TO PUNCTURE OR CUT.: Brand: BARD-PARKER SAFETY BLADES SIZE 10, STERILE